# Patient Record
Sex: FEMALE | Race: OTHER | HISPANIC OR LATINO
[De-identification: names, ages, dates, MRNs, and addresses within clinical notes are randomized per-mention and may not be internally consistent; named-entity substitution may affect disease eponyms.]

---

## 2017-01-18 ENCOUNTER — APPOINTMENT (OUTPATIENT)
Dept: INTERNAL MEDICINE | Facility: CLINIC | Age: 37
End: 2017-01-18

## 2017-01-18 VITALS
WEIGHT: 255 LBS | OXYGEN SATURATION: 97 % | HEIGHT: 62.5 IN | DIASTOLIC BLOOD PRESSURE: 90 MMHG | HEART RATE: 92 BPM | SYSTOLIC BLOOD PRESSURE: 122 MMHG | BODY MASS INDEX: 45.75 KG/M2 | TEMPERATURE: 98.1 F

## 2017-01-18 DIAGNOSIS — Z79.2 LONG TERM (CURRENT) USE OF ANTIBIOTICS: ICD-10-CM

## 2017-01-23 LAB
25(OH)D3 SERPL-MCNC: 26.3 NG/ML
ALBUMIN SERPL ELPH-MCNC: 4.6 G/DL
ALP BLD-CCNC: 46 U/L
ALT SERPL-CCNC: 75 U/L
ANION GAP SERPL CALC-SCNC: 18 MMOL/L
AST SERPL-CCNC: 40 U/L
BILIRUB SERPL-MCNC: 1.1 MG/DL
BUN SERPL-MCNC: 13 MG/DL
CALCIUM SERPL-MCNC: 9.3 MG/DL
CHLORIDE SERPL-SCNC: 92 MMOL/L
CO2 SERPL-SCNC: 26 MMOL/L
CREAT SERPL-MCNC: 0.6 MG/DL
GLUCOSE SERPL-MCNC: 93 MG/DL
HBA1C MFR BLD HPLC: 6.3 %
POTASSIUM SERPL-SCNC: 4.1 MMOL/L
PROT SERPL-MCNC: 7.4 G/DL
SODIUM SERPL-SCNC: 136 MMOL/L

## 2017-02-17 ENCOUNTER — APPOINTMENT (OUTPATIENT)
Dept: INTERNAL MEDICINE | Facility: CLINIC | Age: 37
End: 2017-02-17

## 2017-02-17 VITALS — HEIGHT: 62.5 IN | WEIGHT: 259.75 LBS | BODY MASS INDEX: 46.6 KG/M2

## 2017-02-17 DIAGNOSIS — Z86.59 PERSONAL HISTORY OF OTHER MENTAL AND BEHAVIORAL DISORDERS: ICD-10-CM

## 2017-02-17 DIAGNOSIS — Z86.39 PERSONAL HISTORY OF OTHER ENDOCRINE, NUTRITIONAL AND METABOLIC DISEASE: ICD-10-CM

## 2017-03-17 ENCOUNTER — APPOINTMENT (OUTPATIENT)
Dept: INTERNAL MEDICINE | Facility: CLINIC | Age: 37
End: 2017-03-17

## 2017-03-19 ENCOUNTER — RX RENEWAL (OUTPATIENT)
Age: 37
End: 2017-03-19

## 2017-03-28 ENCOUNTER — APPOINTMENT (OUTPATIENT)
Dept: ENDOCRINOLOGY | Facility: CLINIC | Age: 37
End: 2017-03-28

## 2017-04-06 ENCOUNTER — APPOINTMENT (OUTPATIENT)
Dept: ENDOCRINOLOGY | Facility: CLINIC | Age: 37
End: 2017-04-06

## 2017-04-06 VITALS
SYSTOLIC BLOOD PRESSURE: 138 MMHG | HEIGHT: 62.5 IN | BODY MASS INDEX: 47.55 KG/M2 | DIASTOLIC BLOOD PRESSURE: 91 MMHG | WEIGHT: 265 LBS | HEART RATE: 90 BPM

## 2017-04-06 RX ORDER — TOPIRAMATE 50 MG/1
TABLET, COATED ORAL
Refills: 0 | Status: DISCONTINUED | COMMUNITY
End: 2017-04-06

## 2017-04-18 LAB
17OHP SERPL-MCNC: 33 NG/DL
25(OH)D3 SERPL-MCNC: 28 NG/ML
ALBUMIN SERPL ELPH-MCNC: 4.2 G/DL
ALP BLD-CCNC: 49 U/L
ALT SERPL-CCNC: 121 U/L
ANDROST SERPL-MCNC: 132 NG/DL
ANION GAP SERPL CALC-SCNC: 15 MMOL/L
AST SERPL-CCNC: 59 U/L
BASOPHILS # BLD AUTO: 0.03 K/UL
BASOPHILS NFR BLD AUTO: 0.3 %
BILIRUB SERPL-MCNC: 0.4 MG/DL
BUN SERPL-MCNC: 10 MG/DL
CALCIUM SERPL-MCNC: 9.7 MG/DL
CHLORIDE SERPL-SCNC: 102 MMOL/L
CHOLEST SERPL-MCNC: 136 MG/DL
CHOLEST/HDLC SERPL: 2.2 RATIO
CO2 SERPL-SCNC: 25 MMOL/L
CREAT SERPL-MCNC: 0.7 MG/DL
DHEA-S SERPL-MCNC: 252 UG/DL
EOSINOPHIL # BLD AUTO: 0.32 K/UL
EOSINOPHIL NFR BLD AUTO: 3.4 %
ESTRADIOL SERPL-MCNC: 177 PG/ML
FOLATE SERPL-MCNC: 12.7 NG/ML
FSH SERPL-MCNC: 3.4 IU/L
GLUCOSE SERPL-MCNC: 116 MG/DL
HBA1C MFR BLD HPLC: 6.2 %
HCT VFR BLD CALC: 42.3 %
HDLC SERPL-MCNC: 63 MG/DL
HGB BLD-MCNC: 13.7 G/DL
IMM GRANULOCYTES NFR BLD AUTO: 0.2 %
INSULIN P FAST SERPL-ACNC: 72.5 UU/ML
IRON SERPL-MCNC: 59 UG/DL
LDLC SERPL CALC-MCNC: 52 MG/DL
LH SERPL-ACNC: 7 IU/L
LYMPHOCYTES # BLD AUTO: 2.52 K/UL
LYMPHOCYTES NFR BLD AUTO: 26.7 %
MAN DIFF?: NORMAL
MCHC RBC-ENTMCNC: 29 PG
MCHC RBC-ENTMCNC: 32.4 GM/DL
MCV RBC AUTO: 89.6 FL
MONOCYTES # BLD AUTO: 0.58 K/UL
MONOCYTES NFR BLD AUTO: 6.2 %
NEUTROPHILS # BLD AUTO: 5.96 K/UL
NEUTROPHILS NFR BLD AUTO: 63.2 %
PLATELET # BLD AUTO: 360 K/UL
POTASSIUM SERPL-SCNC: 4.3 MMOL/L
PROLACTIN SERPL-MCNC: 21.3 NG/ML
PROT SERPL-MCNC: 7 G/DL
RBC # BLD: 4.72 M/UL
RBC # FLD: 13.6 %
SODIUM SERPL-SCNC: 142 MMOL/L
T3 SERPL-MCNC: 137 NG/DL
T4 FREE SERPL-MCNC: 1.2 NG/DL
TESTOST BND SERPL-MCNC: 1 PG/ML
TESTOST SERPL-MCNC: 31.9 NG/DL
TRIGL SERPL-MCNC: 107 MG/DL
TSH SERPL-ACNC: 2.64 UIU/ML
VIT B12 SERPL-MCNC: 564 PG/ML
WBC # FLD AUTO: 9.43 K/UL

## 2017-05-21 ENCOUNTER — FORM ENCOUNTER (OUTPATIENT)
Age: 37
End: 2017-05-21

## 2017-05-22 ENCOUNTER — OUTPATIENT (OUTPATIENT)
Dept: OUTPATIENT SERVICES | Facility: HOSPITAL | Age: 37
LOS: 1 days | End: 2017-05-22
Payer: COMMERCIAL

## 2017-05-22 PROCEDURE — 76536 US EXAM OF HEAD AND NECK: CPT

## 2017-05-22 PROCEDURE — 76536 US EXAM OF HEAD AND NECK: CPT | Mod: 26

## 2017-06-08 ENCOUNTER — APPOINTMENT (OUTPATIENT)
Dept: ENDOCRINOLOGY | Facility: CLINIC | Age: 37
End: 2017-06-08

## 2017-06-08 VITALS
HEIGHT: 61.5 IN | DIASTOLIC BLOOD PRESSURE: 94 MMHG | WEIGHT: 266.5 LBS | SYSTOLIC BLOOD PRESSURE: 141 MMHG | HEART RATE: 101 BPM | BODY MASS INDEX: 49.67 KG/M2

## 2017-06-08 DIAGNOSIS — E01.0 IODINE-DEFICIENCY RELATED DIFFUSE (ENDEMIC) GOITER: ICD-10-CM

## 2017-06-08 RX ORDER — METFORMIN HYDROCHLORIDE 500 MG/1
500 TABLET, COATED ORAL
Refills: 0 | Status: DISCONTINUED | COMMUNITY
End: 2017-06-08

## 2017-07-25 ENCOUNTER — APPOINTMENT (OUTPATIENT)
Dept: ENDOCRINOLOGY | Facility: CLINIC | Age: 37
End: 2017-07-25

## 2017-07-25 ENCOUNTER — APPOINTMENT (OUTPATIENT)
Dept: ENDOCRINOLOGY | Facility: CLINIC | Age: 37
End: 2017-07-25
Payer: COMMERCIAL

## 2017-07-25 VITALS
DIASTOLIC BLOOD PRESSURE: 93 MMHG | HEART RATE: 66 BPM | SYSTOLIC BLOOD PRESSURE: 175 MMHG | WEIGHT: 266 LBS | BODY MASS INDEX: 49.45 KG/M2

## 2017-07-25 VITALS — SYSTOLIC BLOOD PRESSURE: 139 MMHG | DIASTOLIC BLOOD PRESSURE: 87 MMHG

## 2017-07-25 PROCEDURE — 97802 MEDICAL NUTRITION INDIV IN: CPT

## 2017-09-12 ENCOUNTER — TRANSCRIPTION ENCOUNTER (OUTPATIENT)
Age: 37
End: 2017-09-12

## 2017-11-09 ENCOUNTER — APPOINTMENT (OUTPATIENT)
Dept: ENDOCRINOLOGY | Facility: CLINIC | Age: 37
End: 2017-11-09

## 2017-11-09 ENCOUNTER — APPOINTMENT (OUTPATIENT)
Dept: INTERNAL MEDICINE | Facility: CLINIC | Age: 37
End: 2017-11-09
Payer: COMMERCIAL

## 2017-11-09 VITALS
TEMPERATURE: 98.5 F | WEIGHT: 266 LBS | HEART RATE: 108 BPM | DIASTOLIC BLOOD PRESSURE: 84 MMHG | OXYGEN SATURATION: 97 % | HEIGHT: 61.5 IN | BODY MASS INDEX: 49.58 KG/M2 | SYSTOLIC BLOOD PRESSURE: 138 MMHG

## 2017-11-09 DIAGNOSIS — Z01.00 ENCOUNTER FOR EXAMINATION OF EYES AND VISION W/OUT ABNORMAL FINDINGS: ICD-10-CM

## 2017-11-09 LAB
BASOPHILS # BLD AUTO: 0.02 K/UL
BASOPHILS NFR BLD AUTO: 0.2 %
EOSINOPHIL # BLD AUTO: 0.44 K/UL
EOSINOPHIL NFR BLD AUTO: 5.1 %
HCT VFR BLD CALC: 43.4 %
HGB BLD-MCNC: 14.2 G/DL
IMM GRANULOCYTES NFR BLD AUTO: 0.2 %
LYMPHOCYTES # BLD AUTO: 2.64 K/UL
LYMPHOCYTES NFR BLD AUTO: 30.4 %
MAN DIFF?: NORMAL
MCHC RBC-ENTMCNC: 29.6 PG
MCHC RBC-ENTMCNC: 32.7 GM/DL
MCV RBC AUTO: 90.4 FL
MONOCYTES # BLD AUTO: 0.42 K/UL
MONOCYTES NFR BLD AUTO: 4.8 %
NEUTROPHILS # BLD AUTO: 5.15 K/UL
NEUTROPHILS NFR BLD AUTO: 59.3 %
PLATELET # BLD AUTO: 384 K/UL
RBC # BLD: 4.8 M/UL
RBC # FLD: 14.5 %
WBC # FLD AUTO: 8.69 K/UL

## 2017-11-09 PROCEDURE — 36415 COLL VENOUS BLD VENIPUNCTURE: CPT

## 2017-11-09 PROCEDURE — 99395 PREV VISIT EST AGE 18-39: CPT | Mod: 25

## 2017-11-09 PROCEDURE — 99212 OFFICE O/P EST SF 10 MIN: CPT | Mod: 25

## 2017-11-09 RX ORDER — FLUCONAZOLE 150 MG/1
150 TABLET ORAL
Qty: 1 | Refills: 0 | Status: COMPLETED | COMMUNITY
Start: 2017-08-26

## 2017-11-09 RX ORDER — BECLOMETHASONE DIPROPIONATE 80 UG/1
AEROSOL, METERED RESPIRATORY (INHALATION)
Refills: 0 | Status: COMPLETED | COMMUNITY
End: 2017-11-09

## 2017-11-09 RX ORDER — ALBUTEROL SULFATE 4 MG
TABLET ORAL
Refills: 0 | Status: COMPLETED | COMMUNITY
End: 2017-11-09

## 2017-11-09 RX ORDER — PHENTERMINE HYDROCHLORIDE 15 MG/1
15 CAPSULE ORAL
Qty: 90 | Refills: 1 | Status: COMPLETED | COMMUNITY
Start: 2017-06-08 | End: 2017-11-09

## 2017-11-09 RX ORDER — BUPROPION HYDROCHLORIDE 200 MG/1
200 TABLET, FILM COATED ORAL
Refills: 0 | Status: COMPLETED | COMMUNITY
End: 2017-11-09

## 2017-11-13 LAB
25(OH)D3 SERPL-MCNC: 31.9 NG/ML
ALBUMIN SERPL ELPH-MCNC: 4.5 G/DL
ALP BLD-CCNC: 43 U/L
ALT SERPL-CCNC: 128 U/L
ANION GAP SERPL CALC-SCNC: 21 MMOL/L
AST SERPL-CCNC: 57 U/L
BILIRUB SERPL-MCNC: 0.6 MG/DL
BUN SERPL-MCNC: 16 MG/DL
CALCIUM SERPL-MCNC: 9.7 MG/DL
CHLORIDE SERPL-SCNC: 99 MMOL/L
CHOLEST SERPL-MCNC: 132 MG/DL
CHOLEST/HDLC SERPL: 2.2 RATIO
CO2 SERPL-SCNC: 21 MMOL/L
CREAT SERPL-MCNC: 0.69 MG/DL
GLUCOSE SERPL-MCNC: 106 MG/DL
HBA1C MFR BLD HPLC: 6 %
HDLC SERPL-MCNC: 61 MG/DL
LDLC SERPL CALC-MCNC: 56 MG/DL
POTASSIUM SERPL-SCNC: 4.7 MMOL/L
PROT SERPL-MCNC: 7.5 G/DL
SODIUM SERPL-SCNC: 141 MMOL/L
TRIGL SERPL-MCNC: 73 MG/DL
TSH SERPL-ACNC: 2.78 UIU/ML

## 2017-11-16 ENCOUNTER — APPOINTMENT (OUTPATIENT)
Dept: ENDOCRINOLOGY | Facility: CLINIC | Age: 37
End: 2017-11-16

## 2018-02-01 ENCOUNTER — APPOINTMENT (OUTPATIENT)
Dept: ENDOCRINOLOGY | Facility: CLINIC | Age: 38
End: 2018-02-01

## 2018-05-01 ENCOUNTER — APPOINTMENT (OUTPATIENT)
Dept: ENDOCRINOLOGY | Facility: CLINIC | Age: 38
End: 2018-05-01
Payer: COMMERCIAL

## 2018-05-01 VITALS
SYSTOLIC BLOOD PRESSURE: 142 MMHG | BODY MASS INDEX: 48.46 KG/M2 | DIASTOLIC BLOOD PRESSURE: 83 MMHG | HEIGHT: 61.5 IN | WEIGHT: 260 LBS | HEART RATE: 86 BPM

## 2018-05-01 PROCEDURE — 99214 OFFICE O/P EST MOD 30 MIN: CPT

## 2018-05-03 ENCOUNTER — TRANSCRIPTION ENCOUNTER (OUTPATIENT)
Age: 38
End: 2018-05-03

## 2018-05-03 LAB
24R-OH-CALCIDIOL SERPL-MCNC: 72.4 PG/ML
25(OH)D3 SERPL-MCNC: 34.3 NG/ML
ALBUMIN SERPL ELPH-MCNC: 5 G/DL
ALP BLD-CCNC: 45 U/L
ALT SERPL-CCNC: 125 U/L
ANION GAP SERPL CALC-SCNC: 17 MMOL/L
AST SERPL-CCNC: 79 U/L
BASOPHILS # BLD AUTO: 0.03 K/UL
BASOPHILS NFR BLD AUTO: 0.4 %
BILIRUB SERPL-MCNC: 0.4 MG/DL
BUN SERPL-MCNC: 14 MG/DL
CALCIUM SERPL-MCNC: 9.9 MG/DL
CHLORIDE SERPL-SCNC: 96 MMOL/L
CHOLEST SERPL-MCNC: 151 MG/DL
CHOLEST/HDLC SERPL: 2.2 RATIO
CO2 SERPL-SCNC: 27 MMOL/L
CREAT SERPL-MCNC: 0.79 MG/DL
CREAT SPEC-SCNC: 222 MG/DL
EOSINOPHIL # BLD AUTO: 0.34 K/UL
EOSINOPHIL NFR BLD AUTO: 4.3 %
GLUCOSE SERPL-MCNC: 104 MG/DL
HBA1C MFR BLD HPLC: 6.1 %
HCT VFR BLD CALC: 46.2 %
HDLC SERPL-MCNC: 70 MG/DL
HGB BLD-MCNC: 14.6 G/DL
IMM GRANULOCYTES NFR BLD AUTO: 0.3 %
LDLC SERPL CALC-MCNC: 64 MG/DL
LYMPHOCYTES # BLD AUTO: 2.42 K/UL
LYMPHOCYTES NFR BLD AUTO: 30.7 %
MAN DIFF?: NORMAL
MCHC RBC-ENTMCNC: 28.5 PG
MCHC RBC-ENTMCNC: 31.6 GM/DL
MCV RBC AUTO: 90.2 FL
MICROALBUMIN 24H UR DL<=1MG/L-MCNC: 2.1 MG/DL
MICROALBUMIN/CREAT 24H UR-RTO: 9 MG/G
MONOCYTES # BLD AUTO: 0.54 K/UL
MONOCYTES NFR BLD AUTO: 6.9 %
NEUTROPHILS # BLD AUTO: 4.53 K/UL
NEUTROPHILS NFR BLD AUTO: 57.4 %
PLATELET # BLD AUTO: 398 K/UL
POTASSIUM SERPL-SCNC: 4.6 MMOL/L
PROT SERPL-MCNC: 7.8 G/DL
RBC # BLD: 5.12 M/UL
RBC # FLD: 13.9 %
SODIUM SERPL-SCNC: 140 MMOL/L
T3FREE SERPL-MCNC: 2.83 PG/ML
T4 FREE SERPL-MCNC: 1.3 NG/DL
TRIGL SERPL-MCNC: 83 MG/DL
TSH SERPL-ACNC: 3.33 UIU/ML
WBC # FLD AUTO: 7.88 K/UL

## 2018-05-04 ENCOUNTER — APPOINTMENT (OUTPATIENT)
Dept: HUMAN REPRODUCTION | Facility: CLINIC | Age: 38
End: 2018-05-04
Payer: COMMERCIAL

## 2018-05-04 PROCEDURE — 36415 COLL VENOUS BLD VENIPUNCTURE: CPT

## 2018-05-04 PROCEDURE — 99215 OFFICE O/P EST HI 40 MIN: CPT | Mod: 25

## 2018-05-05 ENCOUNTER — FORM ENCOUNTER (OUTPATIENT)
Age: 38
End: 2018-05-05

## 2018-05-06 ENCOUNTER — APPOINTMENT (OUTPATIENT)
Dept: ULTRASOUND IMAGING | Facility: HOSPITAL | Age: 38
End: 2018-05-06
Payer: COMMERCIAL

## 2018-05-06 ENCOUNTER — OUTPATIENT (OUTPATIENT)
Dept: OUTPATIENT SERVICES | Facility: HOSPITAL | Age: 38
LOS: 1 days | End: 2018-05-06
Payer: COMMERCIAL

## 2018-05-06 PROCEDURE — 76536 US EXAM OF HEAD AND NECK: CPT | Mod: 26

## 2018-05-06 PROCEDURE — 76536 US EXAM OF HEAD AND NECK: CPT

## 2018-05-08 ENCOUNTER — TRANSCRIPTION ENCOUNTER (OUTPATIENT)
Age: 38
End: 2018-05-08

## 2018-07-05 ENCOUNTER — TRANSCRIPTION ENCOUNTER (OUTPATIENT)
Age: 38
End: 2018-07-05

## 2018-07-09 ENCOUNTER — APPOINTMENT (OUTPATIENT)
Dept: OBGYN | Facility: CLINIC | Age: 38
End: 2018-07-09
Payer: COMMERCIAL

## 2018-07-09 VITALS
DIASTOLIC BLOOD PRESSURE: 113 MMHG | HEIGHT: 62 IN | SYSTOLIC BLOOD PRESSURE: 140 MMHG | BODY MASS INDEX: 48.4 KG/M2 | WEIGHT: 263 LBS

## 2018-07-09 DIAGNOSIS — Z78.9 OTHER SPECIFIED HEALTH STATUS: ICD-10-CM

## 2018-07-09 DIAGNOSIS — Z80.3 FAMILY HISTORY OF MALIGNANT NEOPLASM OF BREAST: ICD-10-CM

## 2018-07-09 DIAGNOSIS — Z82.3 FAMILY HISTORY OF STROKE: ICD-10-CM

## 2018-07-09 PROCEDURE — 99245 OFF/OP CONSLTJ NEW/EST HI 55: CPT

## 2018-10-05 ENCOUNTER — APPOINTMENT (OUTPATIENT)
Dept: ENDOCRINOLOGY | Facility: CLINIC | Age: 38
End: 2018-10-05

## 2019-03-05 PROBLEM — Z31.69 ENCOUNTER FOR PRECONCEPTION CONSULTATION: Status: RESOLVED | Noted: 2018-07-09 | Resolved: 2019-03-05

## 2019-03-05 PROBLEM — O09.529 ANTEPARTUM MULTIGRAVIDA OF ADVANCED MATERNAL AGE: Status: RESOLVED | Noted: 2018-07-09 | Resolved: 2019-03-05

## 2019-03-06 ENCOUNTER — APPOINTMENT (OUTPATIENT)
Dept: INTERNAL MEDICINE | Facility: CLINIC | Age: 39
End: 2019-03-06
Payer: COMMERCIAL

## 2019-03-06 ENCOUNTER — LABORATORY RESULT (OUTPATIENT)
Age: 39
End: 2019-03-06

## 2019-03-06 VITALS
HEIGHT: 62 IN | BODY MASS INDEX: 49.13 KG/M2 | TEMPERATURE: 98.2 F | WEIGHT: 267 LBS | HEART RATE: 107 BPM | OXYGEN SATURATION: 95 % | DIASTOLIC BLOOD PRESSURE: 84 MMHG | SYSTOLIC BLOOD PRESSURE: 118 MMHG

## 2019-03-06 DIAGNOSIS — O09.529 SUPERVISION OF ELDERLY MULTIGRAVIDA, UNSPECIFIED TRIMESTER: ICD-10-CM

## 2019-03-06 DIAGNOSIS — Z31.69 ENCOUNTER FOR OTHER GENERAL COUNSELING AND ADVICE ON PROCREATION: ICD-10-CM

## 2019-03-06 DIAGNOSIS — Z23 ENCOUNTER FOR IMMUNIZATION: ICD-10-CM

## 2019-03-06 DIAGNOSIS — Z87.898 PERSONAL HISTORY OF OTHER SPECIFIED CONDITIONS: ICD-10-CM

## 2019-03-06 DIAGNOSIS — Z86.59 PERSONAL HISTORY OF OTHER MENTAL AND BEHAVIORAL DISORDERS: ICD-10-CM

## 2019-03-06 PROCEDURE — 99214 OFFICE O/P EST MOD 30 MIN: CPT | Mod: 25

## 2019-03-06 PROCEDURE — G0008: CPT

## 2019-03-06 PROCEDURE — 36415 COLL VENOUS BLD VENIPUNCTURE: CPT

## 2019-03-06 PROCEDURE — 90686 IIV4 VACC NO PRSV 0.5 ML IM: CPT

## 2019-03-06 PROCEDURE — 93000 ELECTROCARDIOGRAM COMPLETE: CPT

## 2019-03-06 NOTE — HISTORY OF PRESENT ILLNESS
[de-identified] : 38 y/o female with h/o fatty liver, pre-diabetes, asthma and HTN (NOT on medication) is here for preop clearance prior to D&D/hysteroscopic polyp resection. SHe developed heavy menses several years ago and was found to have polyps.\par She uses rescue MDI ~1/week. \par She needs refills on Metformin.\par No other complaints.\par

## 2019-03-06 NOTE — REVIEW OF SYSTEMS
[Negative] : Heme/Lymph [Dysuria] : no dysuria [Hematuria] : no hematuria [Dysmenorrhea] : dysmenorrhea

## 2019-03-06 NOTE — PHYSICAL EXAM
[No Acute Distress] : no acute distress [Normal Sclera/Conjunctiva] : normal sclera/conjunctiva [Normal Outer Ear/Nose] : the outer ears and nose were normal in appearance [Normal Oropharynx] : the oropharynx was normal [No JVD] : no jugular venous distention [Supple] : supple [No Respiratory Distress] : no respiratory distress  [Clear to Auscultation] : lungs were clear to auscultation bilaterally [No Accessory Muscle Use] : no accessory muscle use [Normal Rate] : normal rate  [Regular Rhythm] : with a regular rhythm [No Edema] : there was no peripheral edema [No Extremity Clubbing/Cyanosis] : no extremity clubbing/cyanosis [Soft] : abdomen soft [Non Tender] : non-tender [No HSM] : no HSM [Normal Bowel Sounds] : normal bowel sounds [No Joint Swelling] : no joint swelling [No Rash] : no rash [Normal Gait] : normal gait [Normal Affect] : the affect was normal [Alert and Oriented x3] : oriented to person, place, and time [de-identified] : obese

## 2019-03-06 NOTE — ASSESSMENT
[FreeTextEntry1] : 38 y/o female is here for preop clearance prior to D&C/polypectomy.\par Metformin refilled (hold day of surgery).\par No NSAIDS one week prior.\par BP good off meds.\par FLu shot given.\par Labs sent. Pending normal results, she will be cleared to proceed.

## 2019-03-07 LAB
ALBUMIN SERPL ELPH-MCNC: 4.3 G/DL
ALP BLD-CCNC: 52 U/L
ALT SERPL-CCNC: 61 U/L
ANION GAP SERPL CALC-SCNC: 13 MMOL/L
APPEARANCE: CLEAR
APTT BLD: 33.4 SEC
AST SERPL-CCNC: 34 U/L
BASOPHILS # BLD AUTO: 0.05 K/UL
BASOPHILS NFR BLD AUTO: 0.6 %
BILIRUB SERPL-MCNC: 0.3 MG/DL
BILIRUBIN URINE: NEGATIVE
BLOOD URINE: ABNORMAL
BUN SERPL-MCNC: 8 MG/DL
CALCIUM SERPL-MCNC: 9.2 MG/DL
CHLORIDE SERPL-SCNC: 102 MMOL/L
CO2 SERPL-SCNC: 25 MMOL/L
COLOR: NORMAL
CREAT SERPL-MCNC: 0.59 MG/DL
EOSINOPHIL # BLD AUTO: 0.35 K/UL
EOSINOPHIL NFR BLD AUTO: 4.3 %
GLUCOSE QUALITATIVE U: NEGATIVE
GLUCOSE SERPL-MCNC: 106 MG/DL
HCG SERPL-MCNC: <1 MIU/ML
HCT VFR BLD CALC: 40.7 %
HGB BLD-MCNC: 12.3 G/DL
IMM GRANULOCYTES NFR BLD AUTO: 0.4 %
INR PPP: 0.9 RATIO
KETONES URINE: NEGATIVE
LEUKOCYTE ESTERASE URINE: ABNORMAL
LYMPHOCYTES # BLD AUTO: 2.4 K/UL
LYMPHOCYTES NFR BLD AUTO: 29.6 %
MAN DIFF?: NORMAL
MCHC RBC-ENTMCNC: 25.9 PG
MCHC RBC-ENTMCNC: 30.2 GM/DL
MCV RBC AUTO: 85.9 FL
MONOCYTES # BLD AUTO: 0.53 K/UL
MONOCYTES NFR BLD AUTO: 6.5 %
NEUTROPHILS # BLD AUTO: 4.75 K/UL
NEUTROPHILS NFR BLD AUTO: 58.6 %
NITRITE URINE: NEGATIVE
PH URINE: 6
PLATELET # BLD AUTO: 412 K/UL
POTASSIUM SERPL-SCNC: 4.6 MMOL/L
PROT SERPL-MCNC: 7.2 G/DL
PROTEIN URINE: NEGATIVE
PT BLD: 10.2 SEC
RBC # BLD: 4.74 M/UL
RBC # FLD: 13.6 %
SODIUM SERPL-SCNC: 140 MMOL/L
SPECIFIC GRAVITY URINE: 1.01
UROBILINOGEN URINE: NORMAL
WBC # FLD AUTO: 8.11 K/UL

## 2019-03-25 VITALS
HEART RATE: 88 BPM | WEIGHT: 268.96 LBS | SYSTOLIC BLOOD PRESSURE: 137 MMHG | TEMPERATURE: 98 F | HEIGHT: 62 IN | RESPIRATION RATE: 20 BRPM | DIASTOLIC BLOOD PRESSURE: 89 MMHG | OXYGEN SATURATION: 97 %

## 2019-03-25 NOTE — ASU PATIENT PROFILE, ADULT - PSH
H/O bilateral breast biopsy  BENIGN  History of surgery  THYROID BIOPSY- ENLARGED  [place] teeth extracted

## 2019-03-26 ENCOUNTER — OUTPATIENT (OUTPATIENT)
Dept: OUTPATIENT SERVICES | Facility: HOSPITAL | Age: 39
LOS: 1 days | Discharge: HOME | End: 2019-03-26
Payer: COMMERCIAL

## 2019-03-26 ENCOUNTER — RESULT REVIEW (OUTPATIENT)
Age: 39
End: 2019-03-26

## 2019-03-26 VITALS — HEART RATE: 80 BPM | TEMPERATURE: 98 F | RESPIRATION RATE: 21 BRPM | OXYGEN SATURATION: 97 %

## 2019-03-26 DIAGNOSIS — K08.409 PARTIAL LOSS OF TEETH, UNSPECIFIED CAUSE, UNSPECIFIED CLASS: Chronic | ICD-10-CM

## 2019-03-26 DIAGNOSIS — Z98.890 OTHER SPECIFIED POSTPROCEDURAL STATES: Chronic | ICD-10-CM

## 2019-03-26 LAB — GLUCOSE BLDC GLUCOMTR-MCNC: 96 MG/DL — SIGNIFICANT CHANGE UP (ref 70–99)

## 2019-03-26 PROCEDURE — 88305 TISSUE EXAM BY PATHOLOGIST: CPT

## 2019-03-26 PROCEDURE — 82962 GLUCOSE BLOOD TEST: CPT

## 2019-03-26 PROCEDURE — 86900 BLOOD TYPING SEROLOGIC ABO: CPT

## 2019-03-26 PROCEDURE — 86850 RBC ANTIBODY SCREEN: CPT

## 2019-03-26 PROCEDURE — 86901 BLOOD TYPING SEROLOGIC RH(D): CPT

## 2019-03-26 PROCEDURE — C1889: CPT

## 2019-03-26 PROCEDURE — 58558 HYSTEROSCOPY BIOPSY: CPT

## 2019-03-26 RX ORDER — OXYCODONE HYDROCHLORIDE 5 MG/1
5 TABLET ORAL EVERY 6 HOURS
Qty: 0 | Refills: 0 | Status: DISCONTINUED | OUTPATIENT
Start: 2019-03-26 | End: 2019-03-26

## 2019-03-26 RX ORDER — OXYCODONE HYDROCHLORIDE 5 MG/1
10 TABLET ORAL EVERY 6 HOURS
Qty: 0 | Refills: 0 | Status: DISCONTINUED | OUTPATIENT
Start: 2019-03-26 | End: 2019-03-26

## 2019-03-26 RX ORDER — ONDANSETRON 8 MG/1
4 TABLET, FILM COATED ORAL EVERY 6 HOURS
Qty: 0 | Refills: 0 | Status: DISCONTINUED | OUTPATIENT
Start: 2019-03-26 | End: 2019-03-26

## 2019-03-26 RX ORDER — HYDROMORPHONE HYDROCHLORIDE 2 MG/ML
0.5 INJECTION INTRAMUSCULAR; INTRAVENOUS; SUBCUTANEOUS
Qty: 0 | Refills: 0 | Status: DISCONTINUED | OUTPATIENT
Start: 2019-03-26 | End: 2019-03-26

## 2019-03-26 NOTE — PACU DISCHARGE NOTE - COMMENTS
PT DISCHARGED HOME IN CARE OF BOYFRIEND RESP REMAIN REG NO DISTRESS N/C NAUSEA IV D'CD TIP INTACT SCANT AMT VAG BLEEDING NOTED PT DRESSED AND AMB TO LOBBY WITHOUT ASST.

## 2019-03-26 NOTE — BRIEF OPERATIVE NOTE - OPERATION/FINDINGS
Normal appearing vagina and cervix, cervical polyps noted, significant polypoid tissue noted within the endometrial canal

## 2019-03-26 NOTE — BRIEF OPERATIVE NOTE - NSICDXBRIEFPREOP_GEN_ALL_CORE_FT
PRE-OP DIAGNOSIS:  Endometrial polyp 26-Mar-2019 13:28:51  Tarci Tripathi PRE-OP DIAGNOSIS:  Endometrial polyp 26-Mar-2019 13:28:51  Traci Tripathi

## 2019-03-26 NOTE — BRIEF OPERATIVE NOTE - NSICDXBRIEFPROCEDURE_GEN_ALL_CORE_FT
PROCEDURES:  Hysteroscopic polypectomy using MyoSure tissue removal system 26-Mar-2019 13:28:29  Traci Tripathi  Diagnostic hysteroscopy 26-Mar-2019 13:27:36  Traci Tripathi PROCEDURES:  Dilation and curettage of uterus with polypectomy 26-Mar-2019 13:40:18  Traci Tripathi  Hysteroscopic polypectomy using MyoSure tissue removal system 26-Mar-2019 13:28:29  Traci Tripathi  Diagnostic hysteroscopy 26-Mar-2019 13:27:36  Traci Tripathi

## 2019-03-27 LAB — SURGICAL PATHOLOGY STUDY: SIGNIFICANT CHANGE UP

## 2019-04-09 PROBLEM — F41.9 ANXIETY DISORDER, UNSPECIFIED: Chronic | Status: ACTIVE | Noted: 2019-03-26

## 2019-04-09 PROBLEM — F32.9 MAJOR DEPRESSIVE DISORDER, SINGLE EPISODE, UNSPECIFIED: Chronic | Status: ACTIVE | Noted: 2019-03-26

## 2019-04-09 PROBLEM — M54.30 SCIATICA, UNSPECIFIED SIDE: Chronic | Status: ACTIVE | Noted: 2019-03-26

## 2019-04-09 PROBLEM — H52.209 UNSPECIFIED ASTIGMATISM, UNSPECIFIED EYE: Chronic | Status: ACTIVE | Noted: 2019-03-26

## 2019-04-09 PROBLEM — J45.909 UNSPECIFIED ASTHMA, UNCOMPLICATED: Chronic | Status: ACTIVE | Noted: 2019-03-26

## 2019-04-19 ENCOUNTER — APPOINTMENT (OUTPATIENT)
Dept: GYNECOLOGIC ONCOLOGY | Facility: CLINIC | Age: 39
End: 2019-04-19
Payer: COMMERCIAL

## 2019-04-19 VITALS
TEMPERATURE: 98.4 F | OXYGEN SATURATION: 98 % | HEART RATE: 102 BPM | WEIGHT: 271 LBS | HEIGHT: 62 IN | SYSTOLIC BLOOD PRESSURE: 137 MMHG | DIASTOLIC BLOOD PRESSURE: 87 MMHG | BODY MASS INDEX: 49.87 KG/M2

## 2019-04-19 PROCEDURE — 99245 OFF/OP CONSLTJ NEW/EST HI 55: CPT

## 2019-04-19 NOTE — HISTORY OF PRESENT ILLNESS
[FreeTextEntry1] : Problem\par 1)Complex atypical hyperplasia \par \par Previous Therapy\par 1)EMC 3/26/19\par    a)Endometrial polyp curettage- Fragments of endometrium with complex atypical hyperplasia\par    b)EMC- Fragments of endometrium with complex atypical hyperplasia

## 2019-04-19 NOTE — CHIEF COMPLAINT
[FreeTextEntry1] : New patient consult. 39 y.o patient referred by Dr. Pride presents today for CAH. She reports a lifetime hx of irregular menses, most recently with spotting between. D&C hysteroscopy signficant for CAH in a polyp in a background of CAH. Patient strongly desires fertility.\par \par GynHx: as above\par ObHx: N/A\par PmHx: obesity, asthma\par SurgHx: wisdom teeth, breast biopsy (fibroadenoma), \par Meds: albuterol, ibuprofen, loratidine, multivitamin, metformin\par All: NKDA\par SocHx: lives with partner, , single; social ETOH, no other toxic habits\par FamHx: Mother breast/sarcoma; maternal aunt breast cancer, paternal grandfather prostate\par

## 2019-04-19 NOTE — ASSESSMENT
[FreeTextEntry1] : Premenopausal women who wish to preserve fertility or women of any menopausal status who are not surgical candidates may be treated with progestin therapy.\par \par Successful pregnancy does occur after resolution of atypical hyperplasia. Treatment with other agents or conservative surgery is not standard clinical practice, but has been described.\par \par Importance of compliance with medical therapy and follow-up endometrial sampling is very important. \par Progestin therapy is typically the oral progestin used for atypical hyperplasia because it is more potent than Progestin therapy. Oral megestrol acetate 80 mg twice per day. This may be increased to 160 mg twice per day if there is no regression of the hyperplasia on follow-up endometrial sampling. Alternatively, a 20 mcg/day Mirena IUD-releasing intrauterine device (LNg20; Mirena) may be utilized alone or can be used in conjunction with oral progestin, but patient does not want to do this at this time.\par \par Other options for progestin therapy have been described and include: \par MPA (oral) 10 to 20 mg daily\par Depot medroxyprogesterone (intramuscular) 150 mg every three months\par Micronized progestin (vaginal) 100 to 200 mg daily\par \par Patient is interested in getting pregnant and thus wants the most effective treatment.   \par Women with complex atypical hyperplasia reported a regression rate of 86 percent, relapse rate of 26 percent.  \par \par I encouraged patient to meet with [facility] over the next few weeks to establish a relationship and discuss fertility options.\par \par Endometrial sampling should be repeated three months after the initiation of progestin therapy. If persistent disease is noted, the progestin dose may be increased. Sampling should be repeated again after three months. The median time for regression on progestin therapy from six to nine months. \par \par Patient with possible hx of PCOS, already on metformin. Recommend that she continue. \par \par Side effects of both drugs have been discussed with patient.\par She requests non-generic agents.  \par She will be seen in 3 months for EMBx.\par Many questions answered.\par \par [] Megace 80mg daily\par [] Patient to make appointment to return for IUD\par [] Follow up with Dr. Pride\par [] Genetic counseling\par \par

## 2019-05-03 ENCOUNTER — APPOINTMENT (OUTPATIENT)
Dept: GYNECOLOGIC ONCOLOGY | Facility: CLINIC | Age: 39
End: 2019-05-03
Payer: COMMERCIAL

## 2019-05-03 PROCEDURE — 58300 INSERT INTRAUTERINE DEVICE: CPT

## 2019-05-03 PROCEDURE — 99215 OFFICE O/P EST HI 40 MIN: CPT | Mod: 25

## 2019-05-08 NOTE — PHYSICAL EXAM
[Normal] : Anus and perineum: Normal sphincter tone, no masses, no prolapse. [de-identified] : morbid obesity, nontender [de-identified] : could not appreciate 2' body habitus [de-identified] : cannot appreciate 2' to body habitus

## 2019-05-08 NOTE — ASSESSMENT
[FreeTextEntry1] : Mirena placed without complication today\par \par [] Megace 80mg daily\par [] Follow up with Dr. Pride\par [] Genetic counseling (Referred 5/2/19)\par [] Follow up in 1 month for string check\par \par \par

## 2019-05-08 NOTE — PROCEDURE
[Other ___] : [unfilled] [Verbal Consent] : verbal consent was obtained prior to the procedure and is detailed in the patient's record [Other: ___] : [unfilled] [Patient] : the patient [None] : none [No Complications] : none [Betadine] : betadine [Post procedure instructions and information given] : post procedure instructions and information given and reviewed with patient. [Tolerated Well] : the patient tolerated the procedure well [FreeTextEntry1] : Uterus sounded to 8cm. Mirena placed without complications. Strings cut to 1 cm\par \par Lot [id number]\par Exp 6/20

## 2019-06-05 ENCOUNTER — APPOINTMENT (OUTPATIENT)
Dept: GYNECOLOGIC ONCOLOGY | Facility: CLINIC | Age: 39
End: 2019-06-05
Payer: COMMERCIAL

## 2019-06-05 PROCEDURE — 99215 OFFICE O/P EST HI 40 MIN: CPT

## 2019-06-05 NOTE — PHYSICAL EXAM
[Normal] : Recto-Vaginal Exam: Normal [de-identified] : IUD strings not visualized. Nontender [de-identified] : morbid obesity, nontender [de-identified] : cannot appreciate 2' to body habitus [de-identified] : could not appreciate 2' body habitus

## 2019-06-05 NOTE — REASON FOR VISIT
[FreeTextEntry1] : Here today for follow up after Mirena placement 5/3/19. Patient reports significant cramping since placement. We discussed possibly removing the IUD and treating with oral progestin alone.

## 2019-06-05 NOTE — ASSESSMENT
[FreeTextEntry1] : Patient with persistent cramping and IUD strings not visualized. Will request ultrasound to assess position in the uterus. \par \par [] TVS\par

## 2019-08-23 ENCOUNTER — APPOINTMENT (OUTPATIENT)
Dept: GYNECOLOGIC ONCOLOGY | Facility: CLINIC | Age: 39
End: 2019-08-23
Payer: COMMERCIAL

## 2019-08-23 VITALS
OXYGEN SATURATION: 94 % | HEIGHT: 62 IN | HEART RATE: 98 BPM | WEIGHT: 284 LBS | BODY MASS INDEX: 52.26 KG/M2 | SYSTOLIC BLOOD PRESSURE: 131 MMHG | DIASTOLIC BLOOD PRESSURE: 91 MMHG

## 2019-08-23 PROCEDURE — 58100 BIOPSY OF UTERUS LINING: CPT

## 2019-08-23 PROCEDURE — 99215 OFFICE O/P EST HI 40 MIN: CPT | Mod: 25

## 2019-08-23 NOTE — HISTORY OF PRESENT ILLNESS
[FreeTextEntry1] : Problem\par 1)Complex atypical hyperplasia \par \par Previous Therapy\par 1)EMC 3/26/19\par    a)Endometrial polyp curettage- Fragments of endometrium with complex atypical hyperplasia\par    b)EMC- Fragments of endometrium with complex atypical hyperplasia \par 2) Mirena IUD 5/3/19\par 3) Pelvic US 6/20/19 \par    a) IUD is identified within the endometrium. endometrium appears markedly thickened

## 2019-08-23 NOTE — ASSESSMENT
[FreeTextEntry1] : Patient doing well overall. We discussed possible biopsy results and recommended management for each possibility. If regression confirmed, will continue current management. If progression or persistence will increase megace dose. \par \par Questions answered. Compliance with treatment emphasized.\par \par [] EMBx\par [] Follow up in 3 months.

## 2019-08-23 NOTE — REASON FOR VISIT
[FreeTextEntry1] : Here for follow up, repeat EMB. Patient reports improvement in symptoms. Decreased vaginal bleeding and she no longer is experiencing uterine cramping. \par \par GynHx: as above, Mirena placed 5/3/19\par ObHx: N/A\par PmHx: obesity, asthma\par SurgHx: wisdom teeth, breast biopsy (fibroadenoma), \par Meds: albuterol, ibuprofen, loratidine, multivitamin, metformin\par All: NKDA\par SocHx: lives with partner, , single; social ETOH, no other toxic habits\par FamHx: Mother breast/sarcoma; maternal aunt breast cancer, paternal grandfather prostate\par

## 2019-08-23 NOTE — PROCEDURE
[Endometrial Biopsy] : an endometrial biopsy [Other: ___] : [unfilled] [Patient] : the patient [Betadine] : betadine [None] : none [Yes] : the specimen was sent to pathology [No Complications] : none [Tolerated Well] : the patient tolerated the procedure well [Post procedure instructions and information given] : post procedure instructions and information given and reviewed with patient. [FreeTextEntry1] : Pipelle passed to 9 cm, two passes with copious blood extracted. Difficult to appreciate if tissue also extracted

## 2019-08-23 NOTE — PHYSICAL EXAM
[de-identified] : morbid obesity, nontender [de-identified] : IUD strings not visualized. Nontender [de-identified] : could not appreciate 2' body habitus [de-identified] : cannot appreciate 2' to body habitus

## 2019-08-27 ENCOUNTER — APPOINTMENT (OUTPATIENT)
Dept: ENDOCRINOLOGY | Facility: CLINIC | Age: 39
End: 2019-08-27
Payer: COMMERCIAL

## 2019-08-27 VITALS
HEART RATE: 90 BPM | SYSTOLIC BLOOD PRESSURE: 154 MMHG | BODY MASS INDEX: 51.21 KG/M2 | WEIGHT: 280 LBS | DIASTOLIC BLOOD PRESSURE: 103 MMHG

## 2019-08-27 LAB
GLUCOSE BLDC GLUCOMTR-MCNC: 94
HBA1C MFR BLD HPLC: 6.1

## 2019-08-27 PROCEDURE — 82962 GLUCOSE BLOOD TEST: CPT

## 2019-08-27 PROCEDURE — 99215 OFFICE O/P EST HI 40 MIN: CPT | Mod: 25

## 2019-08-27 PROCEDURE — 83036 HEMOGLOBIN GLYCOSYLATED A1C: CPT | Mod: QW

## 2019-08-27 PROCEDURE — G0447 BEHAVIOR COUNSEL OBESITY 15M: CPT

## 2019-08-27 RX ORDER — SULFAMETHOXAZOLE AND TRIMETHOPRIM 800; 160 MG/1; MG/1
800-160 TABLET ORAL
Qty: 6 | Refills: 0 | Status: DISCONTINUED | COMMUNITY
Start: 2019-03-07 | End: 2019-08-27

## 2019-08-27 NOTE — ASSESSMENT
[FreeTextEntry1] : Elevated body mass index. Comorbidity of elevated HbA1c. HbA1c 6.1% and blood glucose 94 mg/dL today. We discussed the importance of diet and exercise and lifestyle modification for weight loss. We discussed referral to nutrition. We discussed pharmacologic options for weight loss. She is tolerating metformin and we will continue. She will discuss with her gynecologic oncologist if substitution of megestrol acetate for another progesterone may be appropriate, since megestrol acetate is an appetite stimulant. She did not tolerate prior phentermine. She tolerated prior bupropion for treatment of depression/anxiety, but defer at this time due to hypertension. We discussed the risks and benefits of the GLP-1 receptor agonist class, including but not limited to nausea, pancreatitis, medullary thyroid cancer. She will further consider for next visit. We discussed surgical options for weight loss and she will further consider. Over 15 minutes spent in counseling for weight loss.\par Lifestyle modification\par Referral to nutrition\par \par Thyroid nodules. Her last thyroid ultrasound in June 2018 demonstrated a 2.0 x 1.3 x 1.5 cm left inferior pole nodule and a right 1.0 x 0.5 x 1.0 cm right inferior nodule without suspicious features. Fine needle aspiration of the left inferior nodule in June 2018 was [place] II (benign). She has been clinically and biochemically euthyroid. \par Interval thyroid ultrasound\par Check TSH next visit; she declines today\par \par Polycystic ovarian syndrome. She was diagnosed with polycystic ovarian syndrome in the setting of oligomenorrhea, polycystic ovaries on imaging, and biochemical/clinical evidence of hyperandrogenism. Previous evaluation for other causes of oligomenorrhea was unrevealing. She is treated with a Mirena intrauterine device and megestrol acetate for her complex atypical endometrial hyperplasia.\par \par Return to see me in 2 months.

## 2019-08-27 NOTE — HISTORY OF PRESENT ILLNESS
[FreeTextEntry1] : Ms. Wolf is a 39 year-old woman with a history of polycystic ovarian syndrome, elevated body mass index, elevated HbA1c, thyroid nodules, asthma, seasonal allergies, recent diagnosis of complex atypical endometrial hyperplasia presenting to establish care with me for her endocrine issues. She is a former patient of Eduin Vásquez and Darrell, last seen May 2018. Of note, she is treated with a Mirena intrauterine device and megestrol acetate for her complex atypical endometrial hyperplasia.\par \par Elevated body mass index. Comorbidity of elevated HbA1c. HbA1c 6.1% and blood glucose 94 mg/dL today.\par Her young adult weight was around 200 pounds. She gained weight in college and then on subsequent prior antidepressant therapy. Her current weight of 280 pounds is her highest weight. \par She was on phentermine in the past but did not tolerate due to palpitations. \par She was on bupropion in the past for depression/anxiety, off for a few years. \par She is taking metformin  mg daily; she was previously taking 1000 mg twice daily but was running out of pills prior to this appointment.\par 24 hour diet recall: breakfast, black iced coffee; lunch, salmon, potatoes, beets, avocado; dinner, mozzarella sticks; snacks, tortilla chips, hummus; has green tea with Splenda, no juices/sodas\par Exercise: Limited\par \par Multiple thyroid nodules.\par She was diagnosed with thyroid nodules in 2017 on physical examination, confirmed with thyroid ultrasound.\par Her last thyroid ultrasound in June 2018 demonstrated a 2.0 x 1.3 x 1.5 cm left inferior pole nodule and a right 1.0 x 0.5 x 1.0 cm right inferior nodule without suspicious features. \par Fine needle aspiration of the left inferior nodule in June 2018 was [place] II (benign).\par She has been clinically and biochemically euthyroid. \par Her mother has a history of goiter.\par \par Polycystic ovarian syndrome.\par She was diagnosed with polycystic ovarian syndrome in the setting of oligomenorrhea, polycystic ovaries on imaging, and biochemical/clinical evidence of hyperandrogenism. She has hirsutism above her lip and chin that she plucks. \par Previous evaluation for other causes of oligomenorrhea was unrevealing. \par She was treated with a combined oral contraceptive pill for about four years and has been off since around 2002.\par She has had a number of endometrial biopsies for complex atypical endometrial hyperplasia. Of note, she is treated with a Mirena intrauterine device and megestrol acetate for her complex atypical endometrial hyperplasia.

## 2019-08-27 NOTE — PHYSICAL EXAM
[No Acute Distress] : no acute distress [Alert] : alert [Normal Sclera/Conjunctiva] : normal sclera/conjunctiva [Healthy Appearance] : healthy appearance [Normal Oropharynx] : the oropharynx was normal [No Neck Mass] : no neck mass was observed [Supple] : the neck was supple [No LAD] : no lymphadenopathy [No Thyroid Nodules] : there were no palpable thyroid nodules [Thyroid Not Enlarged] : the thyroid was not enlarged [Normal Rate] : heart rate was normal  [Clear to Auscultation] : lungs were clear to auscultation bilaterally [Normal Rate and Effort] : normal respiratory rhythm and effort [Regular Rhythm] : with a regular rhythm [Normal S1, S2] : normal S1 and S2 [No Stigmata of Cushings Syndrome] : no stigmata of cushings syndrome [Normal Insight/Judgement] : insight and judgment were intact [Normal Gait] : normal gait [Acanthosis Nigricans] : acanthosis nigricans [Kyphosis] : no kyphosis present [de-identified] : no moon facies, no supraclavicular fat pads

## 2019-10-25 ENCOUNTER — APPOINTMENT (OUTPATIENT)
Dept: ENDOCRINOLOGY | Facility: CLINIC | Age: 39
End: 2019-10-25
Payer: COMMERCIAL

## 2019-10-25 ENCOUNTER — RESULT CHARGE (OUTPATIENT)
Age: 39
End: 2019-10-25

## 2019-10-25 ENCOUNTER — MED ADMIN CHARGE (OUTPATIENT)
Age: 39
End: 2019-10-25

## 2019-10-25 VITALS — SYSTOLIC BLOOD PRESSURE: 155 MMHG | DIASTOLIC BLOOD PRESSURE: 108 MMHG | HEART RATE: 85 BPM

## 2019-10-25 VITALS — WEIGHT: 282 LBS | BODY MASS INDEX: 51.58 KG/M2

## 2019-10-25 LAB — GLUCOSE BLDC GLUCOMTR-MCNC: 106

## 2019-10-25 PROCEDURE — 90686 IIV4 VACC NO PRSV 0.5 ML IM: CPT

## 2019-10-25 PROCEDURE — G0008: CPT

## 2019-10-25 PROCEDURE — 97802 MEDICAL NUTRITION INDIV IN: CPT

## 2019-10-25 PROCEDURE — 82947 ASSAY GLUCOSE BLOOD QUANT: CPT | Mod: QW

## 2019-10-25 PROCEDURE — 99215 OFFICE O/P EST HI 40 MIN: CPT | Mod: 25

## 2019-10-25 RX ORDER — DULAGLUTIDE 0.75 MG/.5ML
0.75 INJECTION, SOLUTION SUBCUTANEOUS
Qty: 1 | Refills: 0 | Status: COMPLETED | OUTPATIENT
Start: 2019-10-25 | End: 2019-11-24

## 2019-10-28 ENCOUNTER — TRANSCRIPTION ENCOUNTER (OUTPATIENT)
Age: 39
End: 2019-10-28

## 2019-10-28 LAB
ESTIMATED AVERAGE GLUCOSE: 143 MG/DL
HBA1C MFR BLD HPLC: 6.6 %
TSH SERPL-ACNC: 1.98 UIU/ML
VIT B12 SERPL-MCNC: 694 PG/ML

## 2019-10-28 NOTE — PHYSICAL EXAM
[Alert] : alert [No Acute Distress] : no acute distress [Healthy Appearance] : healthy appearance [Normal Sclera/Conjunctiva] : normal sclera/conjunctiva [No Neck Mass] : no neck mass was observed [Normal Oropharynx] : the oropharynx was normal [Supple] : the neck was supple [Thyroid Not Enlarged] : the thyroid was not enlarged [No LAD] : no lymphadenopathy [No Thyroid Nodules] : there were no palpable thyroid nodules [Normal Rate and Effort] : normal respiratory rhythm and effort [Clear to Auscultation] : lungs were clear to auscultation bilaterally [Normal Rate] : heart rate was normal  [Normal S1, S2] : normal S1 and S2 [Regular Rhythm] : with a regular rhythm [No Stigmata of Cushings Syndrome] : no stigmata of cushings syndrome [Normal Gait] : normal gait [Acanthosis Nigricans] : acanthosis nigricans [Normal Insight/Judgement] : insight and judgment were intact [Kyphosis] : no kyphosis present [de-identified] : no moon facies, no supraclavicular fat pads

## 2019-10-28 NOTE — ASSESSMENT
[FreeTextEntry1] : Elevated body mass index. Comorbidity of elevated HbA1c. HbA1c 6.1% in August 2019 and blood glucose 106 mg/dL today. We discussed the importance of diet and exercise and lifestyle modification for weight loss. We discussed follow-up with nutrition. We discussed pharmacologic options for weight loss. She is tolerating metformin and we will continue. She will discuss with her gynecologic oncologist if substitution of megestrol acetate for another progesterone may be appropriate, since megestrol acetate is an appetite stimulant. She did not tolerate prior phentermine. She tolerated prior bupropion for treatment of depression/anxiety, but defer at this time due to untreated hypertension. She is amenable to a GLP-1 receptor agonist. We discussed the risks and benefits of the GLP-1 receptor agonist class, including but not limited to nausea, pancreatitis, medullary thyroid cancer. We reviewed subcutaneous injection technique, storage, and sharps disposal.\par Check HbA1c, vitamin B12\par Lifestyle modification\par Follow-up with nutrition\par Start Trulicity 0.75 mg weekly if covered by insurance (samples given)\par \par Thyroid nodules. Her last thyroid ultrasound in June 2018 demonstrated a 2.0 x 1.3 x 1.5 cm left inferior pole nodule and a right 1.0 x 0.5 x 1.0 cm right inferior nodule without suspicious features. Fine needle aspiration of the left inferior nodule in June 2018 was [place] II (benign). She has been clinically and biochemically euthyroid. \par Interval thyroid ultrasound\par Check thyroid stimulating hormone\par \par Polycystic ovarian syndrome. She was diagnosed with polycystic ovarian syndrome in the setting of oligomenorrhea, polycystic ovaries on imaging, and biochemical/clinical evidence of hyperandrogenism. Previous evaluation for other causes of oligomenorrhea was unrevealing. She is treated with a Mirena intrauterine device and megestrol acetate for her complex atypical endometrial hyperplasia.\par \par Hypertension. Advise follow-up with primary care provider as soon as possible. \par \par Influenza vaccine administered. \par \par Return to see me in 2 months.

## 2019-10-28 NOTE — HISTORY OF PRESENT ILLNESS
[FreeTextEntry1] : Ms. Wolf is a 39 year-old woman with a history of polycystic ovarian syndrome, elevated body mass index, elevated HbA1c, thyroid nodules, asthma, seasonal allergies, complex atypical endometrial hyperplasia presenting for follow-up of her endocrine issues. I saw her for an initial visit in August 2019; she is a former patient of Eduin Vásquez and [facility]rell. Of note, she is treated with a Mirena intrauterine device and megestrol acetate for her complex atypical endometrial hyperplasia.\par \par Elevated body mass index. Comorbidity of elevated HbA1c. HbA1c 6.1% in August 2019 and blood glucose 106 mg/dL today.\par Her young adult weight was around 200 pounds. She gained weight in college and then on subsequent prior antidepressant therapy. Her current weight in the 280 pound range is her highest weight. \par She was on phentermine in the past but did not tolerate due to palpitations. \par She was on bupropion in the past for depression/anxiety, off for a few years. \par She is taking metformin ER 1000 mg twice daily and tolerating well. \par \par Multiple thyroid nodules.\par She was diagnosed with thyroid nodules in 2017 on physical examination, confirmed with thyroid ultrasound.\par Her last thyroid ultrasound in June 2018 demonstrated a 2.0 x 1.3 x 1.5 cm left inferior pole nodule and a right 1.0 x 0.5 x 1.0 cm right inferior nodule without suspicious features. \par Fine needle aspiration of the left inferior nodule in June 2018 was [place] II (benign).\par She has been clinically and biochemically euthyroid. \par Her mother has a history of goiter.\par \par Polycystic ovarian syndrome.\par She was diagnosed with polycystic ovarian syndrome in the setting of oligomenorrhea, polycystic ovaries on imaging, and biochemical/clinical evidence of hyperandrogenism. She has hirsutism above her lip and chin that she plucks. \par Previous evaluation for other causes of oligomenorrhea was unrevealing. \par She was treated with a combined oral contraceptive pill for about four years and has been off since around 2002.\par She has had a number of endometrial biopsies for complex atypical endometrial hyperplasia. Of note, she is treated with a Mirena intrauterine device and megestrol acetate for her complex atypical endometrial hyperplasia.\par \par Interim History \par She saw Ana Maria Fernandez/nutrition today. \par She is scheduled to see her gynecologic oncologist in November.\par Weight is up 2 pounds from her last visit here.  \par Medical and surgical history, medications, allergies, social and family history reviewed and updated as needed.

## 2019-10-28 NOTE — ADDENDUM
[FreeTextEntry1] : Recent test results as below. HbA1c 6.6%, consistent with type 2 diabetes mellitus. I recommend initiation of Trulicity for glycemic control and weight loss as above. TSH and vitamin B12 within range. I left a telephone message for call back and will send a Portal message with results. 10/28/19

## 2019-11-22 ENCOUNTER — LABORATORY RESULT (OUTPATIENT)
Age: 39
End: 2019-11-22

## 2019-11-22 ENCOUNTER — APPOINTMENT (OUTPATIENT)
Dept: GYNECOLOGIC ONCOLOGY | Facility: CLINIC | Age: 39
End: 2019-11-22
Payer: COMMERCIAL

## 2019-11-22 VITALS
HEART RATE: 102 BPM | WEIGHT: 280 LBS | BODY MASS INDEX: 51.53 KG/M2 | HEIGHT: 62 IN | DIASTOLIC BLOOD PRESSURE: 76 MMHG | SYSTOLIC BLOOD PRESSURE: 130 MMHG | OXYGEN SATURATION: 98 %

## 2019-11-22 PROCEDURE — 99215 OFFICE O/P EST HI 40 MIN: CPT | Mod: 25

## 2019-11-22 PROCEDURE — 58100 BIOPSY OF UTERUS LINING: CPT

## 2019-11-22 NOTE — HISTORY OF PRESENT ILLNESS
[FreeTextEntry1] : Problem\par 1)Complex atypical hyperplasia \par \par Previous Therapy\par 1)EMC 3/26/19\par    a)Endometrial polyp curettage- Fragments of endometrium with complex atypical hyperplasia\par    b)EMC- Fragments of endometrium with complex atypical hyperplasia \par 2) Mirena IUD 5/3/19\par 3) Pelvic US 6/20/19 \par    a) IUD is identified within the endometrium. endometrium appears markedly thickened\par 4) EMB 8/28/2019\par    a) Rare foci of complex atypical hyperplasia in a background of exogenous hormone-related change.

## 2019-11-22 NOTE — PHYSICAL EXAM
[Normal] : Recto-Vaginal Exam: Normal [de-identified] : morbid obesity, nontender [de-identified] : IUD strings not visualized. Nontender [de-identified] : could not appreciate 2' body habitus [de-identified] : cannot appreciate 2' to body habitus

## 2019-11-22 NOTE — PROCEDURE
[Endometrial Biopsy] : an endometrial biopsy [Other: ___] : [unfilled] [Patient] : the patient [None] : none [Betadine] : betadine [Yes] : the specimen was sent to pathology [No Complications] : none [Tolerated Well] : the patient tolerated the procedure well [Post procedure instructions and information given] : post procedure instructions and information given and reviewed with patient. [FreeTextEntry1] : Pipelle passed to 9 cm, two passes with adequate tissue.

## 2019-11-22 NOTE — ASSESSMENT
[FreeTextEntry1] : Patient doing well overall. We discussed possible biopsy results and recommended management for each possibility. If regression confirmed, will continue current management. If progression or persistence will increase megace dose. \par \par Questions answered. Compliance with treatment emphasized.\par \par [] EMBx\par [] Megace refills sent to pharmacy\par [] Follow up genetics referral\par [] Follow up in 3 months.

## 2019-12-05 ENCOUNTER — RX RENEWAL (OUTPATIENT)
Age: 39
End: 2019-12-05

## 2019-12-20 ENCOUNTER — APPOINTMENT (OUTPATIENT)
Dept: ENDOCRINOLOGY | Facility: CLINIC | Age: 39
End: 2019-12-20
Payer: COMMERCIAL

## 2019-12-20 VITALS
HEIGHT: 62 IN | DIASTOLIC BLOOD PRESSURE: 99 MMHG | HEART RATE: 106 BPM | BODY MASS INDEX: 51.16 KG/M2 | SYSTOLIC BLOOD PRESSURE: 141 MMHG | WEIGHT: 278 LBS

## 2019-12-20 LAB — GLUCOSE BLDC GLUCOMTR-MCNC: 94

## 2019-12-20 PROCEDURE — 99214 OFFICE O/P EST MOD 30 MIN: CPT | Mod: 25

## 2019-12-20 PROCEDURE — 82962 GLUCOSE BLOOD TEST: CPT

## 2019-12-20 PROCEDURE — 97803 MED NUTRITION INDIV SUBSEQ: CPT

## 2019-12-21 LAB
CREAT SPEC-SCNC: 274 MG/DL
MICROALBUMIN 24H UR DL<=1MG/L-MCNC: 5.2 MG/DL
MICROALBUMIN/CREAT 24H UR-RTO: 19 MG/G

## 2020-01-07 ENCOUNTER — APPOINTMENT (OUTPATIENT)
Dept: PEDIATRIC MEDICAL GENETICS | Facility: CLINIC | Age: 40
End: 2020-01-07
Payer: COMMERCIAL

## 2020-01-07 DIAGNOSIS — Z80.0 FAMILY HISTORY OF MALIGNANT NEOPLASM OF DIGESTIVE ORGANS: ICD-10-CM

## 2020-01-07 DIAGNOSIS — Z83.49 FAMILY HISTORY OF OTHER ENDOCRINE, NUTRITIONAL AND METABOLIC DISEASES: ICD-10-CM

## 2020-01-07 PROCEDURE — 96040: CPT

## 2020-01-10 PROBLEM — Z83.49 FAMILY HISTORY OF THYROID NODULE: Status: ACTIVE | Noted: 2020-01-10

## 2020-01-10 PROBLEM — Z80.0 FAMILY HISTORY OF MALIGNANT NEOPLASM OF COLON: Status: ACTIVE | Noted: 2020-01-10

## 2020-01-14 ENCOUNTER — FORM ENCOUNTER (OUTPATIENT)
Age: 40
End: 2020-01-14

## 2020-01-15 ENCOUNTER — APPOINTMENT (OUTPATIENT)
Dept: INTERNAL MEDICINE | Facility: CLINIC | Age: 40
End: 2020-01-15
Payer: COMMERCIAL

## 2020-01-15 ENCOUNTER — APPOINTMENT (OUTPATIENT)
Dept: ULTRASOUND IMAGING | Facility: HOSPITAL | Age: 40
End: 2020-01-15
Payer: COMMERCIAL

## 2020-01-15 ENCOUNTER — OUTPATIENT (OUTPATIENT)
Dept: OUTPATIENT SERVICES | Facility: HOSPITAL | Age: 40
LOS: 1 days | End: 2020-01-15
Payer: COMMERCIAL

## 2020-01-15 VITALS
WEIGHT: 278 LBS | TEMPERATURE: 98.7 F | SYSTOLIC BLOOD PRESSURE: 118 MMHG | HEIGHT: 62 IN | HEART RATE: 105 BPM | BODY MASS INDEX: 51.16 KG/M2 | OXYGEN SATURATION: 94 % | DIASTOLIC BLOOD PRESSURE: 84 MMHG

## 2020-01-15 DIAGNOSIS — K08.409 PARTIAL LOSS OF TEETH, UNSPECIFIED CAUSE, UNSPECIFIED CLASS: Chronic | ICD-10-CM

## 2020-01-15 DIAGNOSIS — Z98.890 OTHER SPECIFIED POSTPROCEDURAL STATES: Chronic | ICD-10-CM

## 2020-01-15 PROCEDURE — 99213 OFFICE O/P EST LOW 20 MIN: CPT

## 2020-01-15 PROCEDURE — 73564 X-RAY EXAM KNEE 4 OR MORE: CPT | Mod: 26,LT

## 2020-01-15 PROCEDURE — 76536 US EXAM OF HEAD AND NECK: CPT

## 2020-01-15 PROCEDURE — 76536 US EXAM OF HEAD AND NECK: CPT | Mod: 26

## 2020-01-15 PROCEDURE — 73564 X-RAY EXAM KNEE 4 OR MORE: CPT

## 2020-01-15 RX ORDER — MEGESTROL ACETATE 40 MG/1
40 TABLET ORAL DAILY
Qty: 60 | Refills: 3 | Status: COMPLETED | COMMUNITY
Start: 2019-04-19 | End: 2020-01-15

## 2020-01-15 NOTE — ASSESSMENT
[FreeTextEntry1] : 39 y/o female is here with chronic knee pain.\par Obtain xrays. If negative, refer to ortho.\par If it shows arhtiritis, which is most likely given, imsidious onset and obesity, refer to PT.\par

## 2020-01-15 NOTE — HISTORY OF PRESENT ILLNESS
[Musculoskeletal Symptoms Knees] : knee [Episodic] : episodic [Moderate] : moderate [OTC Remedies] : OTC remedies [In the Morning] : in the morning [Worsening] : worsening [FreeTextEntry1] : 6 months [FreeTextEntry5] : NSAIDS [FreeTextEntry8] : Stairs are worse. Sitting down makes it stiff.\par No swelling noted.\par Pain is infrapatellar.\par Hasn't tried ice.

## 2020-01-15 NOTE — PHYSICAL EXAM
[No Joint Swelling] : no joint swelling [Grossly Normal Strength/Tone] : grossly normal strength/tone [Normal] : no acute distress, well nourished, well developed and well-appearing [Normal Affect] : the affect was normal [Normal Gait] : normal gait [de-identified] : neg anterior /posterior drawer sign, tender at infrapatellar region, no issue with ROM

## 2020-01-21 NOTE — HISTORY OF PRESENT ILLNESS
[FreeTextEntry1] : Ms. Wolf is a 39 year-old woman with a history of type 2 diabetes mellitus, polycystic ovarian syndrome, elevated body mass index, thyroid nodules, asthma, seasonal allergies, complex atypical endometrial hyperplasia presenting for follow-up of her endocrine issues. I saw her for an initial visit in August 2019 and last in October; she is a former patient of Eduin Vásquez and [facility]rell. \par \par Type 2 diabetes mellitus. HbA1c 6.6% in October 2019 and blood glucose 94 mg/dL today. No known history of micro- or macrovascular complications.\par She was diagnosed with diabetes in October 2019 by hemoglobin A1c. No hospitalizations for hypo- or hyperglycemia.\par She is currently taking metformin 1000 mg twice daily. She was prescribed Trulicity last visit but has not yet taken.\par She is not on a blood pressure regimen\par She is not on a statin for cholesterol\par Nephrology screening: Due\par Last ophthalmology appointment: Over a year\par Last dental appointment: August 2019, upcoming appointment in February\par She is up-to-date with influenza vaccine\par \par Elevated body mass index. Comorbidity of type 2 diabetes mellitus.\par Her young adult weight was around 200 pounds. She gained weight in college and then on subsequent prior antidepressant therapy. Her current weight in the 280 pound range is her highest weight. \par She was on phentermine in the past but did not tolerate due to palpitations. \par She was on bupropion in the past for depression/anxiety, off for a few years. \par She is taking metformin ER 1000 mg twice daily and tolerating well. \par \par Multiple thyroid nodules.\par She was diagnosed with thyroid nodules in 2017 on physical examination, confirmed with thyroid ultrasound.\par Her last thyroid ultrasound in June 2018 demonstrated a 2.0 x 1.3 x 1.5 cm left inferior pole nodule and a right 1.0 x 0.5 x 1.0 cm right inferior nodule without suspicious features. \par Fine needle aspiration of the left inferior nodule in June 2018 was benign ([place] II).\par She has been clinically and biochemically euthyroid. \par Her mother has a history of goiter.\par \par Polycystic ovarian syndrome.\par She was diagnosed with polycystic ovarian syndrome in the setting of oligomenorrhea, polycystic ovaries on imaging, and biochemical/clinical evidence of hyperandrogenism. She has hirsutism above her lip and chin that she plucks. \par Previous evaluation for other causes of oligomenorrhea was unrevealing. \par She was treated with a combined oral contraceptive pill for about four years and has been off since around 2002.\par She has had a number of endometrial biopsies for complex atypical endometrial hyperplasia. Of note, she is treated with a Mirena intrauterine device and megestrol acetate for her complex atypical endometrial hyperplasia.\par \par Interim History \par Laboratory results from last visit as below. HbA1c 6.6%, consistent with a new diagnosis of type 2 diabetes mellitus. TSH and vitamin B12 within range. I recommended initiation of Trulicity for glycemic control and weight loss. She states she will start after the holidays.\par She saw Dr. Angel; note reviewed. She needs to continue megestrol acetate.\par She is scheduled to see Ana Maria Fernandez/nutrition today.\par Weight is down 4 pounds from her last visit here. She has knee pain and has not been able to be as physically active.\par Medical and surgical history, medications, allergies, social and family history reviewed and updated as needed.

## 2020-01-21 NOTE — ASSESSMENT
[FreeTextEntry1] : Type 2 diabetes mellitus/elevated body mass index. HbA1c 6.6% in October 2019 and blood glucose 94 mg/dL today. No known history of micro- or macrovascular complications. We discussed the importance of diet and exercise and lifestyle modification for glycemic control and weight loss. We discussed follow-up with nutrition. We discussed pharmacologic options for glycemic control and weight loss. She is tolerating metformin and we will continue. She did not tolerate prior phentermine for weight loss. She tolerated prior bupropion for treatment of depression/anxiety, but defer at this time due to untreated hypertension. She is amenable to a GLP-1 receptor agonist. We discussed the risks and benefits of the GLP-1 receptor agonist class, including but not limited to nausea, pancreatitis, medullary thyroid cancer. We reviewed subcutaneous injection technique, storage, and sharps disposal. She states she will start after the holidays.\par Continue metformin 1000 mg twice daily\par Start Trulicity 0.75 mg weekly if covered by insurance (samples given last visit)\par She is not on a blood pressure regimen; blood pressure elevated and recommend follow-up with primary care\par She is not on a statin for cholesterol; may be indicated at age 40\par Nephrology screening: Due\par Last ophthalmology appointment: Over a year and advised appointment\par Last dental appointment: August 2019, upcoming appointment in February\par She is up-to-date with influenza vaccine\par \par Thyroid nodules. Her last thyroid ultrasound in June 2018 demonstrated a 2.0 x 1.3 x 1.5 cm left inferior pole nodule and a right 1.0 x 0.5 x 1.0 cm right inferior nodule without suspicious features. Fine needle aspiration of the left inferior nodule in June 2018 was benign ([place] II). She has been clinically and biochemically euthyroid. \par Interval thyroid ultrasound\par \par Polycystic ovarian syndrome. She was diagnosed with polycystic ovarian syndrome in the setting of oligomenorrhea, polycystic ovaries on imaging, and biochemical/clinical evidence of hyperandrogenism. Previous evaluation for other causes of oligomenorrhea was unrevealing. She is treated with a Mirena intrauterine device and megestrol acetate for her complex atypical endometrial hyperplasia.\par \par Return to see me and nutrition in 2 months.

## 2020-01-21 NOTE — PHYSICAL EXAM
[No Acute Distress] : no acute distress [Alert] : alert [Normal Oropharynx] : the oropharynx was normal [Healthy Appearance] : healthy appearance [Normal Sclera/Conjunctiva] : normal sclera/conjunctiva [No Neck Mass] : no neck mass was observed [No LAD] : no lymphadenopathy [Supple] : the neck was supple [Thyroid Not Enlarged] : the thyroid was not enlarged [Normal Rate and Effort] : normal respiratory rhythm and effort [No Thyroid Nodules] : there were no palpable thyroid nodules [Normal Rate] : heart rate was normal  [Clear to Auscultation] : lungs were clear to auscultation bilaterally [Normal S1, S2] : normal S1 and S2 [Regular Rhythm] : with a regular rhythm [No Stigmata of Cushings Syndrome] : no stigmata of cushings syndrome [Acanthosis Nigricans] : acanthosis nigricans [Normal Gait] : normal gait [Normal Insight/Judgement] : insight and judgment were intact [Kyphosis] : no kyphosis present [de-identified] : no moon facies, no supraclavicular fat pads

## 2020-02-21 ENCOUNTER — LABORATORY RESULT (OUTPATIENT)
Age: 40
End: 2020-02-21

## 2020-02-21 ENCOUNTER — APPOINTMENT (OUTPATIENT)
Dept: GYNECOLOGIC ONCOLOGY | Facility: CLINIC | Age: 40
End: 2020-02-21
Payer: COMMERCIAL

## 2020-02-21 VITALS
WEIGHT: 274 LBS | HEIGHT: 62 IN | BODY MASS INDEX: 50.42 KG/M2 | DIASTOLIC BLOOD PRESSURE: 86 MMHG | SYSTOLIC BLOOD PRESSURE: 124 MMHG

## 2020-02-21 PROCEDURE — 58100 BIOPSY OF UTERUS LINING: CPT

## 2020-02-21 PROCEDURE — 99215 OFFICE O/P EST HI 40 MIN: CPT | Mod: 25

## 2020-02-26 RX ORDER — MEGESTROL ACETATE 40 MG/1
40 TABLET ORAL
Qty: 112 | Refills: 6 | Status: COMPLETED | COMMUNITY
Start: 2019-11-22 | End: 2020-02-26

## 2020-02-26 NOTE — HISTORY OF PRESENT ILLNESS
[FreeTextEntry1] : Problem\par 1)Complex atypical hyperplasia \par \par Previous Therapy\par 1)EMC 3/26/19\par    a)Endometrial polyp curettage- Fragments of endometrium with complex atypical hyperplasia\par    b)EMC- Fragments of endometrium with complex atypical hyperplasia \par 2) Mirena IUD 5/3/19\par 3) Pelvic US 6/20/19 \par    a) IUD is identified within the endometrium. endometrium appears markedly thickened\par 4) EMB 8/28/2019\par    a) Rare foci of complex atypical hyperplasia in a background of exogenous hormone-related change.\par 5) EMB 11/22/2019 \par    a) Endometrium with foci of complex atypical hyperplasia in a background of exogenous hormone related changes.

## 2020-02-26 NOTE — ASSESSMENT
[FreeTextEntry1] : Patient doing well overall. We discussed possible biopsy results and recommended management for each possibility. If regression confirmed, will continue current management. If progression or persistence will increase megace dose. Tumor has been persistently CAH for 10 months despite appropriate treatment. The possibility of a resistent tumor was discussed. \par \par Questions answered. 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 Pompa\par [] Follow up in July\par \par Addendum: Complex atypical hyperplasia still seen on biopsy. She does not appear to be resonding to this dose, although exogenous hormone effect is seen by the pathologist. Will increased megace dose and plan for D&C hysteroscopy with replacement of Mirena IUD at her next surveillance interval. \par

## 2020-02-26 NOTE — PHYSICAL EXAM
[Normal] : Recto-Vaginal Exam: Normal [de-identified] : morbid obesity, nontender [de-identified] : could not appreciate 2' body habitus [de-identified] : IUD strings not visualized. Nontender [de-identified] : cannot appreciate 2' to body habitus

## 2020-02-26 NOTE — REASON FOR VISIT
[FreeTextEntry1] : Here for 3 month follow up, on megace for repeat EMB. Pt reports she has been well. She is getting very light menses, lasting 3 days. Pt reports she is interested in freezing her eggs, previously seen by Dr. Pride 2 years ago. \par \par Pt was seen by Genetics and as per their note: genetic testing reveals No known disease-causing mutations were identified in any of the genes tested. 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 Heterozygous pathogenic variants in either MSH6 or PMS2 cause Handley syndrome. Biallelic pathogenic variants in either gene cause constitutional mismatch repair deficiency (CMMRD). While her mutations are of "unknown significance" I feel in a patient with CAH, that appears somewhat resistant to hormones, that this may actually be of significance. Risk of uterine and colon cancer discussed. \par \par GynHx: as above, Mirena placed 5/3/19\par ObHx: N/A\par PmHx: obesity, asthma\par SurgHx: wisdom teeth, breast biopsy (fibroadenoma), \par Meds: albuterol, ibuprofen, loratidine, multivitamin, metformin\par All: NKDA\par SocHx: lives with partner, , single; social ETOH, no other toxic habits\par FamHx: Mother breast/sarcoma; maternal aunt breast cancer, paternal grandfather prostate\par

## 2020-02-28 ENCOUNTER — APPOINTMENT (OUTPATIENT)
Dept: ENDOCRINOLOGY | Facility: CLINIC | Age: 40
End: 2020-02-28

## 2020-03-03 LAB — HPV HIGH+LOW RISK DNA PNL CVX: NOT DETECTED

## 2020-03-20 ENCOUNTER — NON-APPOINTMENT (OUTPATIENT)
Age: 40
End: 2020-03-20

## 2020-04-03 ENCOUNTER — APPOINTMENT (OUTPATIENT)
Dept: MAMMOGRAPHY | Facility: CLINIC | Age: 40
End: 2020-04-03

## 2020-04-03 ENCOUNTER — APPOINTMENT (OUTPATIENT)
Dept: ENDOCRINOLOGY | Facility: CLINIC | Age: 40
End: 2020-04-03

## 2020-04-16 ENCOUNTER — APPOINTMENT (OUTPATIENT)
Dept: HUMAN REPRODUCTION | Facility: CLINIC | Age: 40
End: 2020-04-16
Payer: COMMERCIAL

## 2020-04-16 PROCEDURE — 99203 OFFICE O/P NEW LOW 30 MIN: CPT

## 2020-04-17 ENCOUNTER — APPOINTMENT (OUTPATIENT)
Dept: GASTROENTEROLOGY | Facility: CLINIC | Age: 40
End: 2020-04-17

## 2020-04-27 ENCOUNTER — APPOINTMENT (OUTPATIENT)
Dept: ENDOCRINOLOGY | Facility: CLINIC | Age: 40
End: 2020-04-27
Payer: COMMERCIAL

## 2020-04-27 PROCEDURE — 99214 OFFICE O/P EST MOD 30 MIN: CPT | Mod: 95

## 2020-04-27 NOTE — ASSESSMENT
[FreeTextEntry1] : Type 2 diabetes mellitus/elevated body mass index. HbA1c 6.6% in October 2019. No known history of micro- or macrovascular complications. We discussed the importance of diet and exercise and lifestyle modification for glycemic control and weight loss. We discussed follow-up with nutrition. We discussed pharmacologic options for glycemic control and weight loss. She is tolerating metformin and we will continue. She was tolerating Trulicity and we will restart. She did not tolerate prior phentermine for weight loss. She tolerated prior bupropion for treatment of depression/anxiety, but defer at this time due to untreated hypertension. She will further consider surgical options for weight loss. \par Continue metformin ER 1000 mg twice daily\par Restart Trulicity 0.75 mg weekly \par She is not on a blood pressure regimen; last blood pressure within range\par She is not on a statin for cholesterol; can address next visit \par Nephrology screening: Urine microalbumin within range in December 2019\par Last ophthalmology appointment: January 2020\par Last dental appointment: February 2020\par She is up-to-date with influenza vaccine\par \par Thyroid nodules. She has been clinically and biochemically euthyroid. Thyroid ultrasound in June 2018 demonstrated a 2.0 x 1.3 x 1.5 cm left inferior pole nodule and a right 1.0 x 0.5 x 1.0 cm right inferior nodule without suspicious features. Fine needle aspiration of the left inferior nodule in June 2018 was benign ([place] II). Thyroid ultrasound from January 2020 demonstrated the right lower pole now 1.1 x 0.6 x 0.9 cm, hypoechoic, with microcalcifications meeting criteria for biopsy. We discussed that approximately 95 percent of all thyroid nodules are caused by benign conditions. We discussed the procedure for fine needle aspiration of thyroid nodules and possible results, including further testing for atypia of undetermined significance. She will call after biopsy to discuss results. \par \par Polycystic ovarian syndrome. She was diagnosed with polycystic ovarian syndrome in the setting of oligomenorrhea, polycystic ovaries on imaging, and biochemical/clinical evidence of hyperandrogenism. Previous evaluation for other causes of oligomenorrhea was unrevealing. She is treated with a Mirena intrauterine device and megestrol acetate for her complex atypical endometrial hyperplasia.\par \par Return to see me and nutrition in 2-3 months.

## 2020-04-27 NOTE — PHYSICAL EXAM
[Alert] : alert [Healthy Appearance] : healthy appearance [No Acute Distress] : no acute distress [Normal Insight/Judgement] : insight and judgment were intact

## 2020-04-27 NOTE — DATA REVIEWED
[FreeTextEntry1] : Thyroid ultrasound (January 15, 2020) reviewed and significant for:\par RIGHT LOBE:\par Dimensions: 5.1 x 1.4 x 1.9 cm (sagittal x AP x transverse)\par Echotexture: homogeneous\par Vascularity: normovascular\par The right lobe contains two visible nodules.\par \par Nodule 1:\par Location: Lower pole \par Dimensions: 1.1 x 0.6 x 0.9 cm (sagittal x AP x transverse), which previously measured 1.0 x 0.5 x 1.0 cm.\par Composition: Primarily cystic with a small calcified solid component.\par For solid nodules or for the solid portion of a partially cystic nodule, does the solid portion have any of the following suspicious features?\par -  YES: Hypoechoic\par -  Yes: Microcalcifications\par -  No: Irregular margin (infiltrative or microlobulated)\par -  No: Taller than wide (AP dimension > transverse)\par -  No: Extrathyroidal extension\par -  No: Interrupted rim calcification with soft tissue extrusion\par \par Nodule 2:\par Location: Interpolar \par Dimensions: 0.6 x 0.5 x 0.6 cm (sagittal x AP x transverse), which was not previously seen.\par Composition: Solid with a cystic rim\par For solid nodules or for the solid portion of a partially cystic nodule, does the solid portion have any of the following suspicious features?\par -  NO: Hypoechoic\par -  Yes: Microcalcifications\par -  No: Irregular margin (infiltrative or microlobulated)\par -  No: Taller than wide (AP dimension > transverse)\par -  No: Extrathyroidal extension\par -  No: Interrupted rim calcification with soft tissue extrusion\par \par LEFT LOBE:\par Dimensions: 5.3 x 1.4 x 1.8 cm (sagittal x AP x transverse)\par Echotexture: homogeneous\par Vascularity: normovascular\par The left lobe contains one visible nodule.\par \par Nodule 1:\par Location: Lower pole \par Dimensions: 1.6 x 1.3 x 1.6 cm (sagittal x AP x transverse), which previously measured 2.0 x 1.3 x 1.5 cm.\par Composition: Spongiform\par For solid nodules or for the solid portion of a partially cystic nodule, does the solid portion have any of the following suspicious features?\par -  No: Hypoechoic\par -  No: Microcalcifications\par -  No: Irregular margin (infiltrative or microlobulated)\par -  No: Taller than wide (AP dimension > transverse)\par -  No: Extrathyroidal extension\par -  No: Interrupted rim calcification with soft tissue extrusion\par \par ISTHMUS:\par Dimensions: 0.3 cm AP\par The isthmus lobe contains no visible nodules.\par \par CERVICAL LYMPH NODE EVALUATION (for any abnormal lymph node, please note the following: location, size, calcification, cystic area, absence of central hilum, round shape, abnormal blood flow): \par -  Bilateral prominent lymph nodes all of which demonstrate benign galileo architecture.\par \par PARATHYROID EXAMINATION:\par -  Parathyroid glands not visualized.\par \par IMPRESSION:\par Multinodular thyroid gland as above. There is a hypoechoic solid 1.1 cm nodule noted with microcalcification within the lower pole of the right thyroid lobe which meets criteria for ultrasound-guided FNA.

## 2020-04-27 NOTE — HISTORY OF PRESENT ILLNESS
[Medical Office: ([facility])___] : at the medical office located in  [Patient] : the patient [Home] : at home, [unfilled] , at the time of the visit. [FreeTextEntry1] : Ms. Wolf is a 40 year-old woman with a history of type 2 diabetes mellitus, polycystic ovarian syndrome, elevated body mass index, thyroid nodules, asthma, seasonal allergies, complex atypical endometrial hyperplasia presenting for follow-up of her endocrine issues. I saw her for an initial visit in August 2019 and last in December; she is a former patient of Eduin Vásquez and Darrell. \par \par Type 2 diabetes mellitus. HbA1c 6.6% in October 2019. No known history of micro- or macrovascular complications.\par She was diagnosed with diabetes in October 2019 by hemoglobin A1c. No hospitalizations for hypo- or hyperglycemia.\par She has been taking metformin 1000 mg twice daily and Trulicity 0.75 mg weekly (off due to lapsed prescription)\par She is not on a blood pressure regimen\par She is not on a statin for cholesterol\par Nephrology screening: Urine microalbumin within range in December 2019\par Last ophthalmology appointment: January 2020\par Last dental appointment: February 2020\par She is up-to-date with influenza vaccine\par \par Elevated body mass index. Comorbidity of type 2 diabetes mellitus.\par Her young adult weight was around 200 pounds. She gained weight in college and then on subsequent prior antidepressant therapy. Her weight in the 280 pound range was her highest weight. \par She was on phentermine in the past but did not tolerate due to palpitations. \par She was on bupropion in the past for depression/anxiety, off for a few years. \par She has been taking metformin 1000 mg twice daily and Trulicity 0.75 mg weekly and tolerating well. \par \par Multiple thyroid nodules.\par She was diagnosed with thyroid nodules in 2017 on physical examination, confirmed with thyroid ultrasound.\par Thyroid ultrasound in June 2018 demonstrated a 2.0 x 1.3 x 1.5 cm left inferior pole nodule and a right 1.0 x 0.5 x 1.0 cm right inferior nodule without suspicious features. \par Fine needle aspiration of the left inferior nodule in June 2018 was benign ([place] II).\par Thyroid ultrasound from January 2020 with a right lower pole 1.1 x 0.6 x 0.9 cm hypoechoic nodule with microcalcifications meeting criteria for biopsy. \par She has been clinically and biochemically euthyroid. \par Her mother has a history of goiter.\par \par Polycystic ovarian syndrome.\par She was diagnosed with polycystic ovarian syndrome in the setting of oligomenorrhea, polycystic ovaries on imaging, and biochemical/clinical evidence of hyperandrogenism. She has hirsutism above her lip and chin that she plucks. \par Previous evaluation for other causes of oligomenorrhea was unrevealing. \par She was treated with a combined oral contraceptive pill for about four years and has been off since around 2002.\par She has had a number of endometrial biopsies for complex atypical endometrial hyperplasia. Of note, she is treated with a Mirena intrauterine device and megestrol acetate for her complex atypical endometrial hyperplasia.\par \par Interim History \par Urine microalbumin from last visit within range. \par We started Trulicity 0.75 mg weekly in December 2019. She had nausea for two weeks, subsequently improved. She is now off due to lapsed prescription. \par Thyroid ultrasound from January 2020 with a right lower pole 1.1 x 0.6 x 0.9 cm hypoechoic nodule with microcalcifications meeting criteria for biopsy. She did not return my telephone call to discuss.\par She saw Ana Maria Fernandez/nutrition in early April.\par She has been doing physical therapy exercises for 30 minutes every other day. \par Her weight was down to 267 pounds before quarantine; she believes her weight has subsequently increased since that time but she has not weighed herself. She has knee pain and has not been able to be as physically active.\par Medical and surgical history, medications, allergies, social and family history reviewed and updated as needed.

## 2020-06-26 ENCOUNTER — APPOINTMENT (OUTPATIENT)
Dept: GYNECOLOGIC ONCOLOGY | Facility: CLINIC | Age: 40
End: 2020-06-26
Payer: COMMERCIAL

## 2020-06-26 VITALS
DIASTOLIC BLOOD PRESSURE: 82 MMHG | HEIGHT: 62 IN | SYSTOLIC BLOOD PRESSURE: 128 MMHG | WEIGHT: 290 LBS | BODY MASS INDEX: 53.37 KG/M2

## 2020-06-26 PROCEDURE — 99215 OFFICE O/P EST HI 40 MIN: CPT

## 2020-06-26 NOTE — PHYSICAL EXAM
[Normal] : Recto-Vaginal Exam: Normal [de-identified] : IUD strings not visualized. Nontender [de-identified] : morbid obesity, nontender [de-identified] : could not appreciate 2' body habitus [de-identified] : cannot appreciate 2' to body habitus

## 2020-06-26 NOTE — HISTORY OF PRESENT ILLNESS
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 endometrium appears markedly thickened\par 4) EMB 8/28/2019\par    a) Rare foci of complex atypical hyperplasia in a background of exogenous hormone-related change.\par 5) EMB 11/22/2019 \par    a) Endometrium with foci of complex atypical hyperplasia in a background of exogenous hormone related changes. \par 6) EMB 2/27/2020\par     a) CAH\par

## 2020-06-26 NOTE — ASSESSMENT
[FreeTextEntry1] : I discussed with the patient with the aid of diagrams, reviewed the findings on history and physical examination, and reviewed the imaging studies in detail. She has a thickened endometrial lining and abnormal uterine bleeding. \par \par Benign, pre-malignant (such as atypical hyperplasia) and malignant causes of these findings discussed. In order to determine the cause of her symptoms, sampling of the uterus is necessary. The uterus can be sampled either by an in-office endometrial biopsy or via D&C hysteroscopy. In the case of suspected endometrial polyp, a D&C/hysteroscopy is indicated. This allows for removal of the endometrial polyp. This is both diagnostic and therapeutic. \par \par Complications that include, but are not limited to: bleeding, infection and uterine perforation discussed. In the case of uterine perforation, diagnostic laparoscopy may be indicated. I have also provided her with the diagrams. \par \par Surgical scheduling was discussed and instructions for optimization prior to surgery were given. NPO after midnight.  No aspirin or NSAID products for 1 week prior. Instructions for misoprostol 48 hours prior to surgery given.  A copy of the above diagrams was given to the patient. \par \par The total encounter time was ## minutes, of which over 50% was spent face-to-face, examining and counseling the patient, or coordinating care.  \par \par \par Questions answered. Compliance with treatment emphasized.\par [][place] VUS downgraded to normal result, discussed significance\par [] [facility] referral: patient has seen Dr. Pompa. Next follow up would be after a normal EMB\par [] D&C, hysteroscopy, Mirena removal/ replacement 7/10 with Dr. Shepherd\par []Medical Clearance\par []COVID testing 7/7\par []Pelvic US pre op\par \par

## 2020-06-26 NOTE — REASON FOR VISIT
[FreeTextEntry1] : Here for 3 month follow up, on megace  160 q12 for follow up and D&C planning. Feeling well, ahs not been able to lose any weight on the megace. Still having regular menses, but light. \par Of note - Here VUS was downgraded to a benign variant - patient has normal genetic testing results now. \par \par GynHx: as above, Mirena placed 5/3/19\par ObHx: N/A\par PmHx: obesity, asthma\par SurgHx: wisdom teeth, breast biopsy (fibroadenoma), \par Meds: albuterol, ibuprofen, loratidine, multivitamin, metformin\par All: NKDA\par SocHx: lives with partner, , single; social ETOH, no other toxic habits\par FamHx: Mother breast/sarcoma; maternal aunt breast cancer, paternal grandfather prostate\par

## 2020-06-29 LAB
ALBUMIN SERPL ELPH-MCNC: 4.8 G/DL
ALP BLD-CCNC: 38 U/L
ALT SERPL-CCNC: 16 U/L
ANION GAP SERPL CALC-SCNC: 15 MMOL/L
APTT BLD: 35.3 SEC
AST SERPL-CCNC: 14 U/L
BASOPHILS # BLD AUTO: 0.05 K/UL
BASOPHILS NFR BLD AUTO: 0.4 %
BILIRUB SERPL-MCNC: 0.3 MG/DL
BUN SERPL-MCNC: 11 MG/DL
CALCIUM SERPL-MCNC: 9.9 MG/DL
CHLORIDE SERPL-SCNC: 101 MMOL/L
CO2 SERPL-SCNC: 24 MMOL/L
CREAT SERPL-MCNC: 0.81 MG/DL
EOSINOPHIL # BLD AUTO: 0.28 K/UL
EOSINOPHIL NFR BLD AUTO: 2.3 %
ESTIMATED AVERAGE GLUCOSE: 131 MG/DL
HBA1C MFR BLD HPLC: 6.2 %
HCT VFR BLD CALC: 46.5 %
HGB BLD-MCNC: 14.8 G/DL
IMM GRANULOCYTES NFR BLD AUTO: 0.7 %
INR PPP: 0.96 RATIO
LYMPHOCYTES # BLD AUTO: 2.89 K/UL
LYMPHOCYTES NFR BLD AUTO: 24 %
MAN DIFF?: NORMAL
MCHC RBC-ENTMCNC: 29 PG
MCHC RBC-ENTMCNC: 31.8 GM/DL
MCV RBC AUTO: 91.2 FL
MONOCYTES # BLD AUTO: 0.76 K/UL
MONOCYTES NFR BLD AUTO: 6.3 %
NEUTROPHILS # BLD AUTO: 7.98 K/UL
NEUTROPHILS NFR BLD AUTO: 66.3 %
PLATELET # BLD AUTO: 397 K/UL
POTASSIUM SERPL-SCNC: 4.5 MMOL/L
PROT SERPL-MCNC: 7.5 G/DL
PT BLD: 11 SEC
RBC # BLD: 5.1 M/UL
RBC # FLD: 13.2 %
SODIUM SERPL-SCNC: 140 MMOL/L
WBC # FLD AUTO: 12.04 K/UL

## 2020-07-07 ENCOUNTER — RESULT REVIEW (OUTPATIENT)
Age: 40
End: 2020-07-07

## 2020-07-07 ENCOUNTER — OUTPATIENT (OUTPATIENT)
Dept: OUTPATIENT SERVICES | Facility: HOSPITAL | Age: 40
LOS: 1 days | End: 2020-07-07

## 2020-07-07 ENCOUNTER — APPOINTMENT (OUTPATIENT)
Dept: ULTRASOUND IMAGING | Facility: CLINIC | Age: 40
End: 2020-07-07
Payer: COMMERCIAL

## 2020-07-07 ENCOUNTER — APPOINTMENT (OUTPATIENT)
Dept: INTERNAL MEDICINE | Facility: CLINIC | Age: 40
End: 2020-07-07
Payer: COMMERCIAL

## 2020-07-07 VITALS
SYSTOLIC BLOOD PRESSURE: 132 MMHG | HEIGHT: 62 IN | OXYGEN SATURATION: 98 % | TEMPERATURE: 98.4 F | DIASTOLIC BLOOD PRESSURE: 78 MMHG | WEIGHT: 287 LBS | BODY MASS INDEX: 52.81 KG/M2 | HEART RATE: 107 BPM

## 2020-07-07 DIAGNOSIS — G56.01 CARPAL TUNNEL SYNDROME, RIGHT UPPER LIMB: ICD-10-CM

## 2020-07-07 DIAGNOSIS — R74.0 NONSPECIFIC ELEVATION OF LEVELS OF TRANSAMINASE AND LACTIC ACID DEHYDROGENASE [LDH]: ICD-10-CM

## 2020-07-07 DIAGNOSIS — K80.20 CALCULUS OF GALLBLADDER W/OUT CHOLECYSTITIS W/OUT OBSTRUCTION: ICD-10-CM

## 2020-07-07 DIAGNOSIS — K08.409 PARTIAL LOSS OF TEETH, UNSPECIFIED CAUSE, UNSPECIFIED CLASS: Chronic | ICD-10-CM

## 2020-07-07 DIAGNOSIS — Z98.890 OTHER SPECIFIED POSTPROCEDURAL STATES: Chronic | ICD-10-CM

## 2020-07-07 DIAGNOSIS — Z87.39 PERSONAL HISTORY OF OTHER DISEASES OF THE MUSCULOSKELETAL SYSTEM AND CONNECTIVE TISSUE: ICD-10-CM

## 2020-07-07 DIAGNOSIS — M25.562 PAIN IN LEFT KNEE: ICD-10-CM

## 2020-07-07 DIAGNOSIS — R73.01 IMPAIRED FASTING GLUCOSE: ICD-10-CM

## 2020-07-07 LAB
BASOPHILS # BLD AUTO: 0.05 K/UL
BASOPHILS NFR BLD AUTO: 0.5 %
EOSINOPHIL # BLD AUTO: 0.27 K/UL
EOSINOPHIL NFR BLD AUTO: 2.9 %
HCT VFR BLD CALC: 45.2 %
HGB BLD-MCNC: 14.7 G/DL
IMM GRANULOCYTES NFR BLD AUTO: 0.4 %
LYMPHOCYTES # BLD AUTO: 2.63 K/UL
LYMPHOCYTES NFR BLD AUTO: 28.6 %
MAN DIFF?: NORMAL
MCHC RBC-ENTMCNC: 29.2 PG
MCHC RBC-ENTMCNC: 32.5 GM/DL
MCV RBC AUTO: 89.9 FL
MONOCYTES # BLD AUTO: 0.59 K/UL
MONOCYTES NFR BLD AUTO: 6.4 %
NEUTROPHILS # BLD AUTO: 5.61 K/UL
NEUTROPHILS NFR BLD AUTO: 61.2 %
PLATELET # BLD AUTO: 379 K/UL
RBC # BLD: 5.03 M/UL
RBC # FLD: 13.5 %
WBC # FLD AUTO: 9.19 K/UL

## 2020-07-07 PROCEDURE — 76856 US EXAM PELVIC COMPLETE: CPT | Mod: 26,59

## 2020-07-07 PROCEDURE — 99212 OFFICE O/P EST SF 10 MIN: CPT | Mod: 25

## 2020-07-07 PROCEDURE — 99396 PREV VISIT EST AGE 40-64: CPT | Mod: 25

## 2020-07-07 PROCEDURE — 36415 COLL VENOUS BLD VENIPUNCTURE: CPT

## 2020-07-07 PROCEDURE — 76830 TRANSVAGINAL US NON-OB: CPT | Mod: 26

## 2020-07-07 NOTE — HEALTH RISK ASSESSMENT
[Good] : ~his/her~  mood as  good [No falls in past year] : Patient reported no falls in the past year [0] : 2) Feeling down, depressed, or hopeless: Not at all (0) [None] : None [Fully functional (bathing, dressing, toileting, transferring, walking, feeding)] : Fully functional (bathing, dressing, toileting, transferring, walking, feeding) [Fully functional (using the telephone, shopping, preparing meals, housekeeping, doing laundry, using] : Fully functional and needs no help or supervision to perform IADLs (using the telephone, shopping, preparing meals, housekeeping, doing laundry, using transportation, managing medications and managing finances) [[id number]] : 0 [Language] : denies difficulty with language [Change in mental status noted] : No change in mental status noted [Behavior] : denies difficulty with behavior [Learning/Retaining New Information] : denies difficulty learning/retaining new information [Handling Complex Tasks] : denies difficulty handling complex tasks [Reasoning] : denies difficulty with reasoning [Spatial Ability and Orientation] : denies difficulty with spatial ability and orientation

## 2020-07-07 NOTE — PHYSICAL EXAM
[Normal] : no acute distress, well nourished, well developed and well-appearing [Normal Outer Ear/Nose] : the outer ears and nose were normal in appearance [Normal Sclera/Conjunctiva] : normal sclera/conjunctiva [No JVD] : no jugular venous distention [No Lymphadenopathy] : no lymphadenopathy [Supple] : supple [No Accessory Muscle Use] : no accessory muscle use [Clear to Auscultation] : lungs were clear to auscultation bilaterally [Normal Rate] : normal rate  [Regular Rhythm] : with a regular rhythm [Pedal Pulses Present] : the pedal pulses are present [No Extremity Clubbing/Cyanosis] : no extremity clubbing/cyanosis [No Edema] : there was no peripheral edema [Normal Appearance] : normal in appearance [No Nipple Discharge] : no nipple discharge [No Masses] : no palpable masses [Non Tender] : non-tender [Soft] : abdomen soft [No HSM] : no HSM [Non-distended] : non-distended [Normal Bowel Sounds] : normal bowel sounds [No Joint Swelling] : no joint swelling [Normal Gait] : normal gait [No Rash] : no rash [Normal Affect] : the affect was normal [Alert and Oriented x3] : oriented to person, place, and time [de-identified] : obese

## 2020-07-07 NOTE — HISTORY OF PRESENT ILLNESS
[de-identified] : 41 y/o female with diabetes is here for history and physical prior to D&C/hysteroscopy/IUD removal and replacement.\par Generally doing well.\par Diabetes better controlled.\par Due for MAMMO.\par Had preop labs which showed mild elevated WBC-will repeat today.\par Asthma well controlled-not using Ventolin since winter.

## 2020-07-07 NOTE — ASSESSMENT
[FreeTextEntry1] : 40 year is here for a CPE/preop clearance. She was counselled on diet and exercise, drug and alcohol use, age appropriate health care maintenance including vaccines, seatbelts, sunscreen, stress reduction and safe sex. All questions asked/answered to the best of my ability.\par She is cleared to proceed with surgery once labs resulted.\par \par

## 2020-07-15 ENCOUNTER — APPOINTMENT (OUTPATIENT)
Dept: GYNECOLOGIC ONCOLOGY | Facility: CLINIC | Age: 40
End: 2020-07-15
Payer: COMMERCIAL

## 2020-07-18 ENCOUNTER — LABORATORY RESULT (OUTPATIENT)
Age: 40
End: 2020-07-18

## 2020-07-20 ENCOUNTER — APPOINTMENT (OUTPATIENT)
Dept: GYNECOLOGIC ONCOLOGY | Facility: CLINIC | Age: 40
End: 2020-07-20
Payer: COMMERCIAL

## 2020-07-20 ENCOUNTER — TRANSCRIPTION ENCOUNTER (OUTPATIENT)
Age: 40
End: 2020-07-20

## 2020-07-20 VITALS
SYSTOLIC BLOOD PRESSURE: 129 MMHG | HEART RATE: 104 BPM | WEIGHT: 285.5 LBS | HEIGHT: 62 IN | RESPIRATION RATE: 18 BRPM | DIASTOLIC BLOOD PRESSURE: 97 MMHG | TEMPERATURE: 96 F | OXYGEN SATURATION: 97 %

## 2020-07-20 VITALS
BODY MASS INDEX: 53.37 KG/M2 | DIASTOLIC BLOOD PRESSURE: 74 MMHG | SYSTOLIC BLOOD PRESSURE: 129 MMHG | HEIGHT: 62 IN | WEIGHT: 290 LBS

## 2020-07-20 DIAGNOSIS — E27.8 OTHER SPECIFIED DISORDERS OF ADRENAL GLAND: ICD-10-CM

## 2020-07-20 PROCEDURE — 99215 OFFICE O/P EST HI 40 MIN: CPT

## 2020-07-20 NOTE — DISCUSSION/SUMMARY
[FreeTextEntry1] : I discussed with the patient with the aid of diagrams, reviewed the findings on history and physical examination, and reviewed the imaging studies in detail. 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 \par \par Benign, pre-malignant (such as atypical hyperplasia) and malignant causes of these findings discussed. In order to determine the cause of her symptoms, sampling of the uterus is necessary. The uterus can be sampled either by an in-office endometrial biopsy or via D&C hysteroscopy. In the case of suspected endometrial polyp, a D&C/hysteroscopy is indicated. This allows for removal of the endometrial polyp. This is both diagnostic and therapeutic. \par \par Complications that include, but are not limited to: bleeding, infection and uterine perforation discussed. In the case of uterine perforation, diagnostic laparoscopy may be indicated. I have also provided her with the diagrams. \par \par Surgical scheduling was discussed and instructions for optimization prior to surgery were given. NPO after midnight.  No aspirin or NSAID products for 1 week prior.  A copy of the above diagrams was given to the patient. \par \par The total encounter time was 45 minutes, of which over 50% was spent face-to-face, examining and counseling the patient, or coordinating care.  \par \par [] [facility] referral: patient has seen Dr. Pompa. Next follow up would be after a normal EMB\par [] D&C, hysteroscopy, Mirena removal/ replacement 7/21\par [] Will refer to Dr. Mata for bariatric surgery \par []Medical Clearance obtained \par []COVID negative \par

## 2020-07-20 NOTE — REASON FOR VISIT
[FreeTextEntry1] : Here for 3 month follow up, on megace  160 q12 for follow up and D&C planning. Feeling well, ahs not been able to lose any weight on the megace. Still having regular menses, but light. \par Pt has gained 16 lbs since last visit in February.   \par \par GynHx: as above, Mirena placed 5/3/19\par ObHx: N/A\par PmHx: obesity, asthma\par SurgHx: wisdom teeth, breast biopsy (fibroadenoma), \par Meds: albuterol, ibuprofen, loratidine, multivitamin, metformin\par All: NKDA\par SocHx: lives with partner, , single; social ETOH, no other toxic habits\par FamHx: Mother breast/sarcoma; maternal aunt breast cancer, paternal grandfather prostate\par

## 2020-07-20 NOTE — ASU PREOP CHECKLIST - SELECT TESTS ORDERED
CBC/PT/PTT/BMP/INR BMP/CBC/Covid negative, 7/18/PT/PTT/INR Covid negative, 7/18  96   urine hcg-neg/BMP/PT/PTT/CBC/INR

## 2020-07-20 NOTE — ASU PATIENT PROFILE, ADULT - PMH
Anxiety    Asthma    Astigmatism  BILAT  Depression    Sciatica  RIGHT NUMBNESS Anxiety    Asthma    Astigmatism  BILAT  Depression    DM (diabetes mellitus)    Endometrial polyp    Fatty liver    HTN (hypertension)    PCOS (polycystic ovarian syndrome)    Sciatica  RIGHT NUMBNESS  Thyromegaly

## 2020-07-20 NOTE — PHYSICAL EXAM
[de-identified] : IUD at external os visualized. Nontender [de-identified] : could not appreciate 2' body habitus [de-identified] : morbid obesity, nontender [de-identified] : cannot appreciate 2' to body habitus [Fully active, able to carry on all pre-disease performance without restriction] : Status 0 - Fully active, able to carry on all pre-disease performance without restriction

## 2020-07-20 NOTE — ASU PATIENT PROFILE, ADULT - PSH
H/O bilateral breast biopsy  BENIGN  History of surgery  THYROID BIOPSY- ENLARGED  [place] teeth extracted H/O bilateral breast biopsy  BENIGN  History of surgery  THYROID BIOPSY- ENLARGED  Surgery, elective  endometrial polyp removal  [place] teeth extracted

## 2020-07-21 ENCOUNTER — OUTPATIENT (OUTPATIENT)
Dept: OUTPATIENT SERVICES | Facility: HOSPITAL | Age: 40
LOS: 1 days | Discharge: HOME | End: 2020-07-21
Payer: COMMERCIAL

## 2020-07-21 ENCOUNTER — RESULT REVIEW (OUTPATIENT)
Age: 40
End: 2020-07-21

## 2020-07-21 ENCOUNTER — APPOINTMENT (OUTPATIENT)
Dept: GYNECOLOGIC ONCOLOGY | Facility: HOSPITAL | Age: 40
End: 2020-07-21

## 2020-07-21 VITALS
OXYGEN SATURATION: 97 % | RESPIRATION RATE: 14 BRPM | DIASTOLIC BLOOD PRESSURE: 71 MMHG | SYSTOLIC BLOOD PRESSURE: 138 MMHG | HEART RATE: 90 BPM

## 2020-07-21 DIAGNOSIS — Z98.890 OTHER SPECIFIED POSTPROCEDURAL STATES: Chronic | ICD-10-CM

## 2020-07-21 DIAGNOSIS — K08.409 PARTIAL LOSS OF TEETH, UNSPECIFIED CAUSE, UNSPECIFIED CLASS: Chronic | ICD-10-CM

## 2020-07-21 DIAGNOSIS — Z41.9 ENCOUNTER FOR PROCEDURE FOR PURPOSES OTHER THAN REMEDYING HEALTH STATE, UNSPECIFIED: Chronic | ICD-10-CM

## 2020-07-21 LAB — GLUCOSE BLDC GLUCOMTR-MCNC: 96 MG/DL — SIGNIFICANT CHANGE UP (ref 70–99)

## 2020-07-21 PROCEDURE — 58558 HYSTEROSCOPY BIOPSY: CPT

## 2020-07-21 PROCEDURE — 82962 GLUCOSE BLOOD TEST: CPT

## 2020-07-21 PROCEDURE — 88300 SURGICAL PATH GROSS: CPT | Mod: 26,59

## 2020-07-21 PROCEDURE — 88305 TISSUE EXAM BY PATHOLOGIST: CPT

## 2020-07-21 PROCEDURE — 58301 REMOVE INTRAUTERINE DEVICE: CPT

## 2020-07-21 PROCEDURE — 88305 TISSUE EXAM BY PATHOLOGIST: CPT | Mod: 26

## 2020-07-21 PROCEDURE — 88300 SURGICAL PATH GROSS: CPT

## 2020-07-21 PROCEDURE — 58120 DILATION AND CURETTAGE: CPT

## 2020-07-21 PROCEDURE — 86901 BLOOD TYPING SEROLOGIC RH(D): CPT

## 2020-07-21 PROCEDURE — 58300 INSERT INTRAUTERINE DEVICE: CPT

## 2020-07-21 PROCEDURE — 86850 RBC ANTIBODY SCREEN: CPT

## 2020-07-21 RX ORDER — ACETAMINOPHEN 500 MG
1000 TABLET ORAL ONCE
Refills: 0 | Status: COMPLETED | OUTPATIENT
Start: 2020-07-21 | End: 2020-07-21

## 2020-07-21 RX ORDER — ACETAMINOPHEN 500 MG
1000 TABLET ORAL EVERY 6 HOURS
Refills: 0 | Status: DISCONTINUED | OUTPATIENT
Start: 2020-07-21 | End: 2020-07-21

## 2020-07-21 RX ORDER — ONDANSETRON 8 MG/1
8 TABLET, FILM COATED ORAL EVERY 8 HOURS
Refills: 0 | Status: DISCONTINUED | OUTPATIENT
Start: 2020-07-21 | End: 2020-07-21

## 2020-07-21 RX ORDER — OXYCODONE HYDROCHLORIDE 5 MG/1
5 TABLET ORAL EVERY 4 HOURS
Refills: 0 | Status: DISCONTINUED | OUTPATIENT
Start: 2020-07-21 | End: 2020-07-21

## 2020-07-21 RX ORDER — CELECOXIB 200 MG/1
400 CAPSULE ORAL ONCE
Refills: 0 | Status: COMPLETED | OUTPATIENT
Start: 2020-07-21 | End: 2020-07-21

## 2020-07-21 RX ORDER — GABAPENTIN 400 MG/1
600 CAPSULE ORAL ONCE
Refills: 0 | Status: COMPLETED | OUTPATIENT
Start: 2020-07-21 | End: 2020-07-21

## 2020-07-21 RX ORDER — OXYCODONE HYDROCHLORIDE 5 MG/1
10 TABLET ORAL EVERY 4 HOURS
Refills: 0 | Status: DISCONTINUED | OUTPATIENT
Start: 2020-07-21 | End: 2020-07-21

## 2020-07-21 RX ORDER — HEPARIN SODIUM 5000 [USP'U]/ML
5000 INJECTION INTRAVENOUS; SUBCUTANEOUS ONCE
Refills: 0 | Status: COMPLETED | OUTPATIENT
Start: 2020-07-21 | End: 2020-07-21

## 2020-07-21 RX ORDER — HYDROMORPHONE HYDROCHLORIDE 2 MG/ML
0.5 INJECTION INTRAMUSCULAR; INTRAVENOUS; SUBCUTANEOUS
Refills: 0 | Status: DISCONTINUED | OUTPATIENT
Start: 2020-07-21 | End: 2020-07-21

## 2020-07-21 RX ORDER — SIMETHICONE 80 MG/1
80 TABLET, CHEWABLE ORAL EVERY 6 HOURS
Refills: 0 | Status: DISCONTINUED | OUTPATIENT
Start: 2020-07-21 | End: 2020-07-21

## 2020-07-21 RX ORDER — SODIUM CHLORIDE 9 MG/ML
1000 INJECTION, SOLUTION INTRAVENOUS
Refills: 0 | Status: DISCONTINUED | OUTPATIENT
Start: 2020-07-21 | End: 2020-07-21

## 2020-07-21 RX ADMIN — CELECOXIB 400 MILLIGRAM(S): 200 CAPSULE ORAL at 14:12

## 2020-07-21 RX ADMIN — GABAPENTIN 600 MILLIGRAM(S): 400 CAPSULE ORAL at 14:11

## 2020-07-21 RX ADMIN — Medication 1000 MILLIGRAM(S): at 14:11

## 2020-07-21 RX ADMIN — HEPARIN SODIUM 5000 UNIT(S): 5000 INJECTION INTRAVENOUS; SUBCUTANEOUS at 14:11

## 2020-07-21 NOTE — BRIEF OPERATIVE NOTE - NSICDXBRIEFPROCEDURE_GEN_ALL_CORE_FT
PROCEDURES:  Insertion of Mirena IUD 21-Jul-2020 16:35:13  Amelie Rodriguez  Removal of Mirena IUD 21-Jul-2020 16:35:06  Amelie Rodriguez  Diagnostic hysteroscopy 21-Jul-2020 16:34:57  Amelie Rodriguez

## 2020-07-21 NOTE — BRIEF OPERATIVE NOTE - OPERATION/FINDINGS
Endometrial cavity with thick, lush endometrial tissue and multiple small endometrial polyps. Both ostia visualized

## 2020-07-21 NOTE — BRIEF OPERATIVE NOTE - NSICDXBRIEFPOSTOP_GEN_ALL_CORE_FT
POST-OP DIAGNOSIS:  Uterine polyp 21-Jul-2020 16:36:02  Amelie Rodriguez  Endometrial hyperplasia 21-Jul-2020 16:35:55  Amelie Rodriguez

## 2020-07-22 PROBLEM — K76.0 FATTY (CHANGE OF) LIVER, NOT ELSEWHERE CLASSIFIED: Chronic | Status: ACTIVE | Noted: 2020-07-20

## 2020-07-22 PROBLEM — E01.0 IODINE-DEFICIENCY RELATED DIFFUSE (ENDEMIC) GOITER: Chronic | Status: ACTIVE | Noted: 2020-07-20

## 2020-07-22 PROBLEM — N84.0 POLYP OF CORPUS UTERI: Chronic | Status: ACTIVE | Noted: 2020-07-20

## 2020-07-22 PROBLEM — E28.2 POLYCYSTIC OVARIAN SYNDROME: Chronic | Status: ACTIVE | Noted: 2020-07-20

## 2020-07-22 LAB — SURGICAL PATHOLOGY STUDY: SIGNIFICANT CHANGE UP

## 2020-08-24 ENCOUNTER — APPOINTMENT (OUTPATIENT)
Dept: GYNECOLOGIC ONCOLOGY | Facility: CLINIC | Age: 40
End: 2020-08-24

## 2020-08-28 ENCOUNTER — APPOINTMENT (OUTPATIENT)
Dept: GYNECOLOGIC ONCOLOGY | Facility: CLINIC | Age: 40
End: 2020-08-28
Payer: COMMERCIAL

## 2020-08-28 VITALS
HEIGHT: 62 IN | WEIGHT: 286 LBS | BODY MASS INDEX: 52.63 KG/M2 | OXYGEN SATURATION: 97 % | SYSTOLIC BLOOD PRESSURE: 122 MMHG | HEART RATE: 100 BPM | TEMPERATURE: 98 F | DIASTOLIC BLOOD PRESSURE: 72 MMHG

## 2020-08-28 PROCEDURE — 99215 OFFICE O/P EST HI 40 MIN: CPT

## 2020-08-28 NOTE — PHYSICAL EXAM
[Normal] : Recto-Vaginal Exam: Normal [Fully active, able to carry on all pre-disease performance without restriction] : Status 0 - Fully active, able to carry on all pre-disease performance without restriction [de-identified] : IUD at external os visualized. Nontender [de-identified] : morbid obesity, nontender [de-identified] : could not appreciate 2' body habitus [de-identified] : cannot appreciate 2' to body habitus

## 2020-08-28 NOTE — REASON FOR VISIT
[FreeTextEntry1] : Here for 1 month follow up after D&C. Patient reports she is doing well overall. \par \par We discussed the pathology findings of persistent focal CAH. Definitive vs. continued conservative management discussed. Patient is not yet ready for a hysterectomy as she would like to preserve her fertility. \par \par The role that estrogen and obesity in the pathogenesis of CAH was discussed. She is currently on the maximum doses of progesterone. Importance of weight loss emphasized. Patient would like to pursue a consult with a bariatric surgeon.\par \par GynHx: as above, Mirena placed 5/3/19\par ObHx: N/A\par PmHx: obesity, asthma\par SurgHx: wisdom teeth, breast biopsy (fibroadenoma), \par Meds: albuterol, ibuprofen, loratidine, multivitamin, metformin\par All: NKDA\par SocHx: lives with partner, , single; social ETOH, no other toxic habits\par FamHx: Mother breast/sarcoma; maternal aunt breast cancer, paternal grandfather prostate\par

## 2020-08-28 NOTE — ASSESSMENT
[FreeTextEntry1] : Persistent CAH noted on recent D&C. Risk of progression to endometrial cancer discussed, however since no invasive component was seen, it is appropriate to continue fertility sparing management at this time. \par \par I will discuss fertility preservation options (ex: egg freezing) with Dr. Pompa in the setting of persistent CAH.\par \par Patient given referral for several bariatric surgeons at her last visit and endorses still having their information at home\par \par Follow up in 3 months

## 2020-08-28 NOTE — HISTORY OF PRESENT ILLNESS
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 endometrium appears markedly thickened\par 4) EMB 8/28/2019\par    a) Rare foci of complex atypical hyperplasia in a background of exogenous hormone-related change.\par 5) EMB 11/22/2019 \par    a) Endometrium with foci of complex atypical hyperplasia in a background of exogenous hormone related changes. \par 6) EMB 2/27/2020\par     a) CAH\par 7) Pelvic US 7/7/20\par     a) uterus 8.4cm\par     b) endometrium 0.8cm , prominent echogenicity along its superior extent\par     c) IUD low in position \par 8) D&C hysteroscopy 7/27/20\par     a) Uterus, intrauterine device; removal: -Intrauterine device-IUD (gross examination only).\par     b)  Endometrium, curettings:\par - Endometrium with foci of complex atypical hyperplasia\par - Foci consistent with portions of benign endometrial polyp\par - Extensive areas consistent with progestin therapy effect\par - Acute and chronic endometritis - see note

## 2020-11-03 ENCOUNTER — RX RENEWAL (OUTPATIENT)
Age: 40
End: 2020-11-03

## 2020-11-05 ENCOUNTER — APPOINTMENT (OUTPATIENT)
Dept: HUMAN REPRODUCTION | Facility: CLINIC | Age: 40
End: 2020-11-05
Payer: COMMERCIAL

## 2020-11-05 PROCEDURE — 99214 OFFICE O/P EST MOD 30 MIN: CPT | Mod: 95

## 2020-12-28 ENCOUNTER — NON-APPOINTMENT (OUTPATIENT)
Age: 40
End: 2020-12-28

## 2021-01-01 NOTE — ADDENDUM
[FreeTextEntry1] : Urine microalbumin within range. I will send a letter to Ms. Wolf with this reassuring result. 12/21/19\par \par Recent thyroid ultrasound with a right lower pole 1.1 x 0.6 x 0.9 cm hypoechoic nodule with microcalcifications meeting criteria for biopsy. I left a message for call back and will send the result and order for biopsy to Ms. Wolf by mail. 1/21/20\par \par Thyroid ultrasound (January 15, 2020) reviewed and significant for:\par RIGHT LOBE:\par Dimensions: 5.1 x 1.4 x 1.9 cm (sagittal x AP x transverse)\par Echotexture: homogeneous\par Vascularity: normovascular\par The right lobe contains two visible nodules.\par \par Nodule 1:\par Location: Lower pole \par Dimensions: 1.1 x 0.6 x 0.9 cm (sagittal x AP x transverse), which previously measured 1.0 x 0.5 x 1.0 cm.\par Composition: Primarily cystic with a small calcified solid component.\par For solid nodules or for the solid portion of a partially cystic nodule, does the solid portion have any of the following suspicious features?\par -  YES: Hypoechoic\par -  Yes: Microcalcifications\par -  No: Irregular margin (infiltrative or microlobulated)\par -  No: Taller than wide (AP dimension > transverse)\par -  No: Extrathyroidal extension\par -  No: Interrupted rim calcification with soft tissue extrusion\par \par Nodule 2:\par Location: Interpolar \par Dimensions: 0.6 x 0.5 x 0.6 cm (sagittal x AP x transverse), which was not previously seen.\par Composition: Solid with a cystic rim\par For solid nodules or for the solid portion of a partially cystic nodule, does the solid portion have any of the following suspicious features?\par -  NO: Hypoechoic\par -  Yes: Microcalcifications\par -  No: Irregular margin (infiltrative or microlobulated)\par -  No: Taller than wide (AP dimension > transverse)\par -  No: Extrathyroidal extension\par -  No: Interrupted rim calcification with soft tissue extrusion\par \par LEFT LOBE:\par Dimensions: 5.3 x 1.4 x 1.8 cm (sagittal x AP x transverse)\par Echotexture: homogeneous\par Vascularity: normovascular\par The left lobe contains one visible nodule.\par \par Nodule 1:\par Location: Lower pole \par Dimensions: 1.6 x 1.3 x 1.6 cm (sagittal x AP x transverse), which previously measured 2.0 x 1.3 x 1.5 cm.\par Composition: Spongiform\par For solid nodules or for the solid portion of a partially cystic nodule, does the solid portion have any of the following suspicious features?\par -  No: Hypoechoic\par -  No: Microcalcifications\par -  No: Irregular margin (infiltrative or microlobulated)\par -  No: Taller than wide (AP dimension > transverse)\par -  No: Extrathyroidal extension\par -  No: Interrupted rim calcification with soft tissue extrusion\par \par ISTHMUS:\par Dimensions: 0.3 cm AP\par The isthmus lobe contains no visible nodules.\par \par CERVICAL LYMPH NODE EVALUATION (for any abnormal lymph node, please note the following: location, size, calcification, cystic area, absence of central hilum, round shape, abnormal blood flow): \par -  Bilateral prominent lymph nodes all of which demonstrate benign galileo architecture.\par \par PARATHYROID EXAMINATION:\par -  Parathyroid glands not visualized.\par \par IMPRESSION:\par Multinodular thyroid gland as above. There is a hypoechoic solid 1.1 cm nodule noted with microcalcification within the lower pole of the right thyroid lobe which meets criteria for ultrasound-guided FNA. 3

## 2021-01-08 ENCOUNTER — APPOINTMENT (OUTPATIENT)
Dept: GYNECOLOGIC ONCOLOGY | Facility: CLINIC | Age: 41
End: 2021-01-08
Payer: COMMERCIAL

## 2021-01-08 VITALS
SYSTOLIC BLOOD PRESSURE: 133 MMHG | HEIGHT: 62 IN | DIASTOLIC BLOOD PRESSURE: 88 MMHG | WEIGHT: 293 LBS | BODY MASS INDEX: 53.92 KG/M2 | OXYGEN SATURATION: 95 % | HEART RATE: 107 BPM | TEMPERATURE: 97.2 F | RESPIRATION RATE: 17 BRPM

## 2021-01-08 VITALS
OXYGEN SATURATION: 98 % | DIASTOLIC BLOOD PRESSURE: 86 MMHG | TEMPERATURE: 95.8 F | BODY MASS INDEX: 25.76 KG/M2 | HEIGHT: 62 IN | WEIGHT: 140 LBS | SYSTOLIC BLOOD PRESSURE: 125 MMHG | HEART RATE: 95 BPM

## 2021-01-08 PROCEDURE — 99215 OFFICE O/P EST HI 40 MIN: CPT | Mod: 25

## 2021-01-08 PROCEDURE — 99072 ADDL SUPL MATRL&STAF TM PHE: CPT

## 2021-01-08 PROCEDURE — 58100 BIOPSY OF UTERUS LINING: CPT

## 2021-01-08 NOTE — PROCEDURE
[Endometrial Biopsy] : an endometrial biopsy [Other: ___] : [unfilled] [Patient] : the patient [None] : none [Betadine] : betadine [Yes] : the specimen was sent to pathology [No Complications] : none [Tolerated Well] : the patient tolerated the procedure well [Post procedure instructions and information given] : post procedure instructions and information given and reviewed with patient. [FreeTextEntry1] : UCG negative. Pipelle introduced to 9 cm. Two passes. Adequate tissue.

## 2021-01-08 NOTE — REASON FOR VISIT
[FreeTextEntry1] : Patient presents for 3 month surveillance visit and EMBx. After her last visit, I had a long discussion with Dr. Pompa about fertility sparing options and the patient was encouraged to see Dr. Pompa for possible egg retrieval. She is waiting for her insurance, which covers [facility] to become active.\par \par The role that estrogen and obesity in the pathogenesis of CAH was discussed. She is currently on the maximum doses of progesterone. Importance of weight loss emphasized. Patient reports that she has gained weight since her last visit and is having trouble controlling her food intake and does not have time to exercise. \par \par At the last visit, patient shared that she would once again like to pursue a consult with a bariatric surgeon. She has not seen the bariatric surgeon since her last visit, but said she is trying to find time in her vacation schedule to see the surgeon. \par \par I explained that with CAH that it is resistant treatment, a hysterectomy may ultimately be necessary.\par \par She shared with me that she has noticed occasional joint pain in the past few months. She is unsure if this is a side effect of progesterone or something unrelated. I encouraged her to see her PCP to r/o rheumatologic causes of joint pain. Similarly we discussed that joint pain can be 2' to weight gain.

## 2021-01-08 NOTE — ASSESSMENT
[FreeTextEntry1] : Once again we discussed the recommendation for hysterectomy if she fails fertility sparing management.\par \par Premenopausal women who wish to preserve fertility or women of any menopausal status who are not surgical candidates may be treated with progestin therapy.\par \par Successful pregnancy does occur after resolution of atypical hyperplasia. Treatment with other agents or conservative surgery is not standard clinical practice, but has been described.\par \par Importance of compliance with medical therapy and follow-up endometrial sampling is very important. \par Progestin therapy is typically the oral progestin used for atypical hyperplasia because it is more potent than Progestin therapy. Oral megestrol acetate 80 mg twice per day. This may be increased to 160 mg twice per day if there is no regression of the hyperplasia on follow-up endometrial sampling. Alternatively, a 20 mcg/day Mirena IUD-releasing intrauterine device ([id number]; Mirena) may be utilized alone or can be used in conjunction with oral progestin, but patient does not want to do this at this time.\par \par Endometrial sampling should be repeated three months after the initiation of progestin therapy. If persistent disease is noted, the progestin dose may be increased. Sampling should be repeated again after three months. The median time for regression on progestin therapy from six to nine months. \par \par Will follow up EMBx and consider pursuing clinical trial for her if she continues to display CAH on pathology.\par \par [] EMBx\par [] Bariatric surgery\par [] [facility]\par [] Follow up in 3 months\par

## 2021-01-08 NOTE — PHYSICAL EXAM
[Normal] : Recto-Vaginal Exam: Normal [de-identified] : morbid obesity, nontender [de-identified] : IUD strings not visualized. Nontender [de-identified] : could not appreciate 2' body habitus [de-identified] : cannot appreciate 2' to body habitus

## 2021-01-29 ENCOUNTER — RX RENEWAL (OUTPATIENT)
Age: 41
End: 2021-01-29

## 2021-04-30 ENCOUNTER — NON-APPOINTMENT (OUTPATIENT)
Age: 41
End: 2021-04-30

## 2021-04-30 ENCOUNTER — APPOINTMENT (OUTPATIENT)
Dept: GYNECOLOGIC ONCOLOGY | Facility: CLINIC | Age: 41
End: 2021-04-30
Payer: COMMERCIAL

## 2021-04-30 VITALS
OXYGEN SATURATION: 95 % | DIASTOLIC BLOOD PRESSURE: 83 MMHG | WEIGHT: 293 LBS | TEMPERATURE: 97.1 F | SYSTOLIC BLOOD PRESSURE: 137 MMHG | HEART RATE: 108 BPM | HEIGHT: 62 IN | BODY MASS INDEX: 53.92 KG/M2

## 2021-04-30 PROCEDURE — 99215 OFFICE O/P EST HI 40 MIN: CPT

## 2021-04-30 PROCEDURE — 99072 ADDL SUPL MATRL&STAF TM PHE: CPT

## 2021-04-30 RX ORDER — MEGESTROL ACETATE 40 MG/1
40 TABLET ORAL
Qty: 240 | Refills: 2 | Status: COMPLETED | COMMUNITY
Start: 2020-02-26 | End: 2021-04-30

## 2021-04-30 NOTE — ASSESSMENT
[FreeTextEntry1] : Most recent pathology reassuring for response to treatment. \par \par Patient with significant weight gain the past year. We discussed this at length today. This has been an ongoing concern at each visit. I have encouraged her to see bariatrics and nutrition. She has done neither. Without appropriate management of her weight, I am afraid her CAH will recur and possibly progress to cancer.\par \par Given recent normal pathology, we will decrease her megace dose to 80mg BID to try to mitigate the side effects.\par \par Follow up in 3 months for biopsy.\par \par

## 2021-04-30 NOTE — HISTORY OF PRESENT ILLNESS
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 endometrium appears markedly thickened\par 4) EMB 8/28/2019\par  a) Rare foci of complex atypical hyperplasia in a background of exogenous hormone-related change.\par 5) EMB 11/22/2019 \par  a) Endometrium with foci of complex atypical hyperplasia in a background of exogenous hormone related changes. \par 6) EMB 2/27/2020\par  a) CAH\par 7) Pelvic US 7/7/20\par  a) uterus 8.4cm\par  b) endometrium 0.8cm , prominent echogenicity along its superior extent\par  c) IUD low in position \par 8) D&C hysteroscopy 7/27/20, removal of IUD and replacement of Mirena IUD\par  a) Uterus, intrauterine device; removal: -Intrauterine device-IUD (gross examination only).\par  b) Endometrium, curettings:\par - Endometrium with foci of complex atypical hyperplasia\par - Foci consistent with portions of benign endometrial polyp\par - Extensive areas consistent with progestin therapy effect\par - Acute and chronic endometritis - see note \par 8) S/P EMB 1/8/2021\par   a) Endometrial curettings/ biopsy:\par  - Inactive endometrium with pseudodecidualized stroma, compatible with exogenous hormonal effect. No\par    hyperplasia or atypia is seen\par \par Pt is currently with Mirena IUD and taking Megace 160mg BID\par \par

## 2021-04-30 NOTE — PHYSICAL EXAM
[Normal] : Recto-Vaginal Exam: Normal [de-identified] : morbid obesity, nontender [de-identified] : IUD strings visualized. Nontender [de-identified] : could not appreciate 2' body habitus [de-identified] : cannot appreciate 2' to body habitus

## 2021-05-14 ENCOUNTER — APPOINTMENT (OUTPATIENT)
Dept: GYNECOLOGIC ONCOLOGY | Facility: CLINIC | Age: 41
End: 2021-05-14
Payer: COMMERCIAL

## 2021-05-20 ENCOUNTER — APPOINTMENT (OUTPATIENT)
Dept: BARIATRICS | Facility: CLINIC | Age: 41
End: 2021-05-20
Payer: COMMERCIAL

## 2021-05-20 VITALS — BODY MASS INDEX: 53.92 KG/M2 | WEIGHT: 293 LBS | HEIGHT: 62 IN

## 2021-05-20 DIAGNOSIS — K76.0 FATTY (CHANGE OF) LIVER, NOT ELSEWHERE CLASSIFIED: ICD-10-CM

## 2021-05-20 PROCEDURE — 99214 OFFICE O/P EST MOD 30 MIN: CPT | Mod: 95

## 2021-06-04 NOTE — REVIEW OF SYSTEMS
[Patient Intake Form Reviewed] : Patient intake form was reviewed [Shortness Of Breath] : no shortness of breath [Wheezing] : wheezing [Cough] : no cough [SOB on Exertion] : no shortness of breath during exertion [Negative] : Psychiatric

## 2021-06-04 NOTE — HISTORY OF PRESENT ILLNESS
[Home] : at home, [unfilled] , at the time of the visit. [Other Location: e.g. Home (Enter Location, City,State)___] : at [unfilled] [Verbal consent obtained from patient] : the patient, [unfilled] [de-identified] : Patient is a 41 year old female with along history of morbid obesity not responsive to multiple dietary regimens. She has a BMI of 56.7 ans weight-related comorbidities including hypertension, type 2 diabetes mellitus, asthma, fatty liver and PCOS.

## 2021-06-04 NOTE — ASSESSMENT
[FreeTextEntry1] : Patient meets the NIH criteria for bariatric surgery and would like to have a laparoscopic sleeve gastrectomy. I have reviewed the risks and benefits of the procedure with the patient, and she understands this information fully. She will need to lose at least 15 pounds prior to surgery.

## 2021-06-23 NOTE — PHYSICAL EXAM
Problem: PHYSICAL THERAPY ADULT  Goal: Performs mobility at highest level of function for planned discharge setting  See evaluation for individualized goals  Description: Treatment/Interventions: Functional transfer training, LE strengthening/ROM, Elevations, Therapeutic exercise, Endurance training, Equipment eval/education, Bed mobility, Gait training, Spoke to nursing, Spoke to case management, OT  Equipment Recommended: Art Núñez       See flowsheet documentation for full assessment, interventions and recommendations  6/23/2021 1535 by Raul Flores, PT  Note: Prognosis: Guarded  Problem List: Decreased strength, Decreased endurance, Impaired balance, Decreased mobility, Decreased cognition, Decreased safety awareness  Assessment: Pt is a 80 y o  male seen for PT evaluation s/p admit to [facility] on 6/22/2021  Pt was admitted with a primary dx of: severe aortic stenosis  Pt is POD #1 s/p TAVR  PT now consulted for assessment of mobility and d/c needs  Pt with Up with assistance, Ambulate, PT evaluation orders  Pts current comorbidities effecting treatment include: BPH, carotid stenosis, depression, DM, GERD, hx of TIA, HTN, pulmonary fibrosis, splenomegaly  Pts current clinical presentation is Unstable/ Unpredictable (high complexity) due to Ongoing medical management for primary dx, Increased reliance on more restrictive AD compared to baseline, Decreased activity tolerance compared to baseline, Fall risk, Increased assistance needed from caregiver at current time, Ongoing telemetry monitoring, Cog status, s/p surgical intervention  Pt questionable historian 2* cog, but reports being I with ADLs and ambulating with SPC at baseline  Pt lives alone in a trailer with 3 GAURANG  Upon evaluation, pt currently is requiring modA for bed mobility; Chantelle for transfers and Chantelle for ambulation 140 ft w/ RW   Pt presents at PT eval functioning below baseline and currently w/ overall mobility deficits 2* to: BLE weakness, impaired balance, decreased endurance, gait deviations, pain, decreased activity tolerance compared to baseline, decreased functional mobility tolerance compared to baseline, fall risk, decreased cognition  Pt currently at a fall risk 2* to impairments listed above  Pt will continue to benefit from skilled acute PT interventions to address stated impairments; to maximize functional mobility; for ongoing pt/ family training; and DME needs  At conclusion of PT session pt returned back in chair and chair alarm engaged with phone and call bell within reach  Pt denies any further questions at this time  The patient's AM-PAC Basic Mobility Inpatient Short Form Raw Score is 17, Standardized Score is 39 67  A standardized score less than 42 9 suggests the patient may benefit from discharge to post-acute rehabilitation services  Please also refer to the recommendation of the Physical Therapist for safe discharge planning  Recommend rehab pending progress upon hospital D/C  Barriers to Discharge: Decreased caregiver support        PT Discharge Recommendation: Post acute rehabilitation services (pending progress)          See flowsheet documentation for full assessment  [Normal] : Recto-Vaginal Exam: Normal [de-identified] : morbid obesity, nontender [de-identified] : cannot appreciate 2' to body habitus [de-identified] : could not appreciate 2' body habitus

## 2021-07-19 ENCOUNTER — RX RENEWAL (OUTPATIENT)
Age: 41
End: 2021-07-19

## 2021-07-29 ENCOUNTER — RX RENEWAL (OUTPATIENT)
Age: 41
End: 2021-07-29

## 2021-07-30 ENCOUNTER — APPOINTMENT (OUTPATIENT)
Dept: GYNECOLOGIC ONCOLOGY | Facility: CLINIC | Age: 41
End: 2021-07-30
Payer: COMMERCIAL

## 2021-07-30 VITALS
BODY MASS INDEX: 53.92 KG/M2 | TEMPERATURE: 96.6 F | OXYGEN SATURATION: 96 % | HEIGHT: 62 IN | SYSTOLIC BLOOD PRESSURE: 126 MMHG | WEIGHT: 293 LBS | DIASTOLIC BLOOD PRESSURE: 85 MMHG | HEART RATE: 107 BPM

## 2021-07-30 PROCEDURE — 99215 OFFICE O/P EST HI 40 MIN: CPT | Mod: 25

## 2021-07-30 PROCEDURE — 58100 BIOPSY OF UTERUS LINING: CPT

## 2021-07-30 NOTE — REASON FOR VISIT
[FreeTextEntry1] : Patient presents for 6 month surveillance. She met with Dr. Mata regarding bariatric surgery. She has not yet scheduled her follow up with him or with Dr. Pompa, but it is on her to-do list. \par \par She has no acute complaints today.

## 2021-07-30 NOTE — PHYSICAL EXAM
[Normal] : Recto-Vaginal Exam: Normal [de-identified] : morbid obesity, nontender [de-identified] : IUD strings visualized. Nontender [de-identified] : could not appreciate 2' body habitus [de-identified] : cannot appreciate 2' to body habitus

## 2021-07-30 NOTE — ASSESSMENT
[FreeTextEntry1] : Patient tolerated EMB\par \par Importance of weight loss emphasized. We also discussed long term management of her endometrial hyperplasia. I am hesitant to make any adjustments to her current regimen as she had persistent hyperplasia after 18 months of treatment and finally had a response on her most recent biopsy.\par \par If today's biopsy is normal, follow up in 3 months.\par \par

## 2021-08-03 ENCOUNTER — NON-APPOINTMENT (OUTPATIENT)
Age: 41
End: 2021-08-03

## 2021-10-29 ENCOUNTER — APPOINTMENT (OUTPATIENT)
Dept: HUMAN REPRODUCTION | Facility: CLINIC | Age: 41
End: 2021-10-29
Payer: COMMERCIAL

## 2021-10-29 ENCOUNTER — RESULT REVIEW (OUTPATIENT)
Age: 41
End: 2021-10-29

## 2021-10-29 ENCOUNTER — APPOINTMENT (OUTPATIENT)
Dept: GYNECOLOGIC ONCOLOGY | Facility: CLINIC | Age: 41
End: 2021-10-29
Payer: COMMERCIAL

## 2021-10-29 ENCOUNTER — OUTPATIENT (OUTPATIENT)
Dept: OUTPATIENT SERVICES | Facility: HOSPITAL | Age: 41
LOS: 1 days | End: 2021-10-29

## 2021-10-29 ENCOUNTER — APPOINTMENT (OUTPATIENT)
Dept: MAMMOGRAPHY | Facility: CLINIC | Age: 41
End: 2021-10-29
Payer: COMMERCIAL

## 2021-10-29 VITALS
DIASTOLIC BLOOD PRESSURE: 75 MMHG | SYSTOLIC BLOOD PRESSURE: 125 MMHG | WEIGHT: 293 LBS | TEMPERATURE: 95 F | OXYGEN SATURATION: 96 % | HEART RATE: 116 BPM | HEIGHT: 62 IN | BODY MASS INDEX: 53.92 KG/M2

## 2021-10-29 DIAGNOSIS — Z98.890 OTHER SPECIFIED POSTPROCEDURAL STATES: Chronic | ICD-10-CM

## 2021-10-29 DIAGNOSIS — K08.409 PARTIAL LOSS OF TEETH, UNSPECIFIED CAUSE, UNSPECIFIED CLASS: Chronic | ICD-10-CM

## 2021-10-29 DIAGNOSIS — Z41.9 ENCOUNTER FOR PROCEDURE FOR PURPOSES OTHER THAN REMEDYING HEALTH STATE, UNSPECIFIED: Chronic | ICD-10-CM

## 2021-10-29 PROCEDURE — 99215 OFFICE O/P EST HI 40 MIN: CPT | Mod: 25

## 2021-10-29 PROCEDURE — 58100 BIOPSY OF UTERUS LINING: CPT

## 2021-10-29 PROCEDURE — 77063 BREAST TOMOSYNTHESIS BI: CPT | Mod: 26

## 2021-10-29 PROCEDURE — 36415 COLL VENOUS BLD VENIPUNCTURE: CPT

## 2021-10-29 PROCEDURE — 77067 SCR MAMMO BI INCL CAD: CPT | Mod: 26

## 2021-10-29 RX ORDER — MISOPROSTOL 200 UG/1
200 TABLET ORAL
Qty: 10 | Refills: 0 | Status: COMPLETED | COMMUNITY
Start: 2020-07-17 | End: 2021-10-29

## 2021-11-01 NOTE — HISTORY OF PRESENT ILLNESS
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 endometrium appears markedly thickened\par 4) EMB 8/28/2019\par  a) Rare foci of complex atypical hyperplasia in a background of exogenous hormone-related change.\par 5) EMB 11/22/2019 \par  a) Endometrium with foci of complex atypical hyperplasia in a background of exogenous hormone related changes. \par 6) EMB 2/27/2020\par  a) CAH\par 7) Pelvic US 7/7/20\par  a) uterus 8.4cm\par  b) endometrium 0.8cm , prominent echogenicity along its superior extent\par  c) IUD low in position \par 8) D&C hysteroscopy 7/27/20, removal of IUD and replacement of Mirena IUD\par  a) Uterus, intrauterine device; removal: -Intrauterine device-IUD (gross examination only).\par  b) Endometrium, curettings:\par - Endometrium with foci of complex atypical hyperplasia\par - Foci consistent with portions of benign endometrial polyp\par - Extensive areas consistent with progestin therapy effect\par - Acute and chronic endometritis - see note \par 8) S/P EMB 1/8/2021\par   a) Endometrial curettings/ biopsy:\par  - Inactive endometrium with pseudodecidualized stroma, compatible with exogenous hormonal effect. No\par    hyperplasia or atypia is seen\par 9) S/P EMB 7/20/21\par    a) - Superficial fragments of endometrium with marked stromal decidualization suggestive of exogenous hormonal administration\par       -  No endometrial hyperplasia or atypia seen\par \par \par

## 2021-11-01 NOTE — PHYSICAL EXAM
[Normal] : Recto-Vaginal Exam: Normal [de-identified] : morbid obesity, nontender [de-identified] : IUD strings visualized. Nontender [de-identified] : could not appreciate 2' body habitus [de-identified] : cannot appreciate 2' to body habitus

## 2021-11-01 NOTE — REASON FOR VISIT
[FreeTextEntry1] : Pt presents for 3 month follow up. Pt is currently with Mirena IUD and taking Megace 80mg BID. Pt states she has noticed abdominal pain described as "menstrual cramps" when she lays on her stomach. Otherwise, patient has not complaints. \par \par UCG today Neg \par \par Mammogram- 10/29/21- circumscribed mass in each breast. Patient will be called back for bilateral breast ultrasound.

## 2021-11-01 NOTE — ASSESSMENT
[FreeTextEntry1] : Patient once again counseled regarding fertility sparing management of CAH. We discussed decreasing megace to 80m BID if today's biopsy continues to show complete response.\par \par She is scheduled to see Dr. Pompa again for egg retrieval\par \par Bilateral breast ultrasound requested by radiology. Referral given.\par \par She has decided to wait until next year to consider bariatric surgery.\par \par [] BL breast US\par [] Decrease megace to 80mg BID if complete response\par [] Follow up in 3 months.

## 2021-11-03 ENCOUNTER — NON-APPOINTMENT (OUTPATIENT)
Age: 41
End: 2021-11-03

## 2021-11-03 LAB — CORE LAB BIOPSY: NORMAL

## 2021-11-05 ENCOUNTER — NON-APPOINTMENT (OUTPATIENT)
Age: 41
End: 2021-11-05

## 2021-11-10 ENCOUNTER — APPOINTMENT (OUTPATIENT)
Dept: HUMAN REPRODUCTION | Facility: CLINIC | Age: 41
End: 2021-11-10
Payer: COMMERCIAL

## 2021-11-10 PROCEDURE — 99499PP: CUSTOM

## 2021-11-23 ENCOUNTER — APPOINTMENT (OUTPATIENT)
Dept: INTERNAL MEDICINE | Facility: CLINIC | Age: 41
End: 2021-11-23
Payer: COMMERCIAL

## 2021-11-23 PROCEDURE — 99213 OFFICE O/P EST LOW 20 MIN: CPT | Mod: 95

## 2021-11-23 NOTE — PHYSICAL EXAM
[No Acute Distress] : no acute distress [Normal Sclera/Conjunctiva] : normal sclera/conjunctiva [Normal Outer Ear/Nose] : the outer ears and nose were normal in appearance [No Respiratory Distress] : no respiratory distress  [No Accessory Muscle Use] : no accessory muscle use [Normal Affect] : the affect was normal [Alert and Oriented x3] : oriented to person, place, and time [de-identified] : obese

## 2021-11-23 NOTE — HISTORY OF PRESENT ILLNESS
[Home] : at home, [unfilled] , at the time of the visit. [Medical Office: ([facility])___] : at the medical office located in  [Verbal consent obtained from patient] : the patient, [unfilled] [de-identified] : 42 y/o female presents for medication management.\par h/o obesity for which she had visit with bariatric surgeon but had to put on hold due to work/school.\par H/o asthma. Hasn't used inhaler in 3 years but it  and she is travelling this week and is nervous to go away with it.\par Needs refill on Metformin. Understands she hasn't seen ENDO or me in >1 year and needs to but I will give her 1 refill.\par Agrees to f/u ASAP.\par No complaints.\par No change to health.\par \par

## 2021-11-23 NOTE — ASSESSMENT
[FreeTextEntry1] : Meds refilled. Pt needs appointment ASAP. She understands this.\par Advised f/u with batriatric surgery.\par

## 2021-11-26 ENCOUNTER — APPOINTMENT (OUTPATIENT)
Dept: ULTRASOUND IMAGING | Facility: CLINIC | Age: 41
End: 2021-11-26
Payer: COMMERCIAL

## 2021-11-26 ENCOUNTER — RESULT REVIEW (OUTPATIENT)
Age: 41
End: 2021-11-26

## 2021-11-26 ENCOUNTER — OUTPATIENT (OUTPATIENT)
Dept: OUTPATIENT SERVICES | Facility: HOSPITAL | Age: 41
LOS: 1 days | End: 2021-11-26

## 2021-11-26 DIAGNOSIS — Z41.9 ENCOUNTER FOR PROCEDURE FOR PURPOSES OTHER THAN REMEDYING HEALTH STATE, UNSPECIFIED: Chronic | ICD-10-CM

## 2021-11-26 DIAGNOSIS — Z98.890 OTHER SPECIFIED POSTPROCEDURAL STATES: Chronic | ICD-10-CM

## 2021-11-26 DIAGNOSIS — K08.409 PARTIAL LOSS OF TEETH, UNSPECIFIED CAUSE, UNSPECIFIED CLASS: Chronic | ICD-10-CM

## 2021-11-26 PROCEDURE — 76641 ULTRASOUND BREAST COMPLETE: CPT | Mod: 26,50

## 2021-11-29 ENCOUNTER — NON-APPOINTMENT (OUTPATIENT)
Age: 41
End: 2021-11-29

## 2021-11-30 ENCOUNTER — NON-APPOINTMENT (OUTPATIENT)
Age: 41
End: 2021-11-30

## 2021-12-20 ENCOUNTER — NON-APPOINTMENT (OUTPATIENT)
Age: 41
End: 2021-12-20

## 2022-01-14 ENCOUNTER — APPOINTMENT (OUTPATIENT)
Dept: GYNECOLOGIC ONCOLOGY | Facility: CLINIC | Age: 42
End: 2022-01-14
Payer: COMMERCIAL

## 2022-02-11 ENCOUNTER — APPOINTMENT (OUTPATIENT)
Dept: GYNECOLOGIC ONCOLOGY | Facility: CLINIC | Age: 42
End: 2022-02-11
Payer: COMMERCIAL

## 2022-02-11 VITALS
WEIGHT: 293 LBS | DIASTOLIC BLOOD PRESSURE: 91 MMHG | HEART RATE: 115 BPM | SYSTOLIC BLOOD PRESSURE: 128 MMHG | BODY MASS INDEX: 53.92 KG/M2 | HEIGHT: 62 IN | OXYGEN SATURATION: 95 % | TEMPERATURE: 95.5 F

## 2022-02-11 PROCEDURE — 58100 BIOPSY OF UTERUS LINING: CPT

## 2022-02-11 PROCEDURE — 99214 OFFICE O/P EST MOD 30 MIN: CPT | Mod: 25

## 2022-02-11 NOTE — HISTORY OF PRESENT ILLNESS
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 endometrium appears markedly thickened\par 4) EMB 8/28/2019\par  a) Rare foci of complex atypical hyperplasia in a background of exogenous hormone-related change.\par 5) EMB 11/22/2019 \par  a) Endometrium with foci of complex atypical hyperplasia in a background of exogenous hormone related changes. \par 6) EMB 2/27/2020\par  a) CAH\par 7) Pelvic US 7/7/20\par  a) uterus 8.4cm\par  b) endometrium 0.8cm , prominent echogenicity along its superior extent\par  c) IUD low in position \par 8) D&C hysteroscopy 7/27/20, removal of IUD and replacement of Mirena IUD\par  a) Uterus, intrauterine device; removal: -Intrauterine device-IUD (gross examination only).\par  b) Endometrium, curettings:\par - Endometrium with foci of complex atypical hyperplasia\par - Foci consistent with portions of benign endometrial polyp\par - Extensive areas consistent with progestin therapy effect\par - Acute and chronic endometritis - see note \par 8) S/P EMB 1/8/2021\par   a) Endometrial curettings/ biopsy:\par  - Inactive endometrium with pseudodecidualized stroma, compatible with exogenous hormonal effect. No\par    hyperplasia or atypia is seen\par 9) S/P EMB 7/20/21\par    a) - Superficial fragments of endometrium with marked stromal decidualization suggestive of exogenous hormonal administration\par       -  No endometrial hyperplasia or atypia seen\par 10) 10/29/21- EMB-- Fragments of inactive endometrium with marked pseudodecidualization and inflammation.\par   - Benign endocervical tissue.\par   - Fibrin and inflammatory exudate.\par   - Negative for hyperplasia.\par \par \par   immune

## 2022-02-11 NOTE — ASSESSMENT
[FreeTextEntry1] : Patient once again counseled regarding fertility sparing management of CAH. \par \par We discussed decreasing megace to 40mg BID in 3 months if she continues to demonstrate a prolonged response.\par \par Information for [facility] given for second opinion. \par \par [] EMBx\par [] Follow up in 3 months. \par [] Needs repeat breast ultrasound in May 2022- order at next visit\par

## 2022-02-11 NOTE — PROCEDURE
[Endometrial Biopsy] : an endometrial biopsy [Other: ___] : [unfilled] [Patient] : the patient [Betadine] : betadine [Yes] : the specimen was sent to pathology [No Complications] : none [Tolerated Well] : the patient tolerated the procedure well [Post procedure instructions and information given] : post procedure instructions and information given and reviewed with patient. [FreeTextEntry1] : UCG negative. Pipelle introduced to 8 cm, 1 pass, adequate tissue.

## 2022-02-11 NOTE — PHYSICAL EXAM
[Normal] : Recto-Vaginal Exam: Normal [de-identified] : morbid obesity, nontender [de-identified] : IUD strings visualized. Nontender [de-identified] : could not appreciate 2' body habitus [de-identified] : cannot appreciate 2' to body habitus

## 2022-02-11 NOTE — REASON FOR VISIT
[FreeTextEntry1] : Pt presents for 3 month follow up. Pt is currently with Mirena IUD and taking Megace 40 BID\par \par She has no acute complaints today and reports that she feels much better after decreasing her megace dose. She has lost 15lbs and has noticed an improvement in her generalized muscle weakness. \par \par She shared with me that she was interested in having egg retrieval, but that she was told that she is too obese and that her ovaries are not visible. She is requesting a referral for a second opinion. \par \par Mammogram- 10/29/21- circumscribed mass in each breast. Patient will be called back for bilateral breast ultrasound. \par BREAST US: Probably benign masses in both breasts. In the absence of the prior ultrasound for comparison, recommend follow-up bilateral breast ultrasound in 6 months (May 2022).\par

## 2022-02-18 LAB — CORE LAB BIOPSY: NORMAL

## 2022-03-08 RX ORDER — MEGESTROL ACETATE 40 MG/1
40 TABLET ORAL
Qty: 336 | Refills: 3 | Status: COMPLETED | COMMUNITY
Start: 2021-04-30 | End: 2022-03-08

## 2022-06-02 ENCOUNTER — RESULT CHARGE (OUTPATIENT)
Age: 42
End: 2022-06-02

## 2022-06-03 ENCOUNTER — APPOINTMENT (OUTPATIENT)
Dept: GYNECOLOGIC ONCOLOGY | Facility: CLINIC | Age: 42
End: 2022-06-03
Payer: COMMERCIAL

## 2022-06-03 VITALS
RESPIRATION RATE: 18 BRPM | SYSTOLIC BLOOD PRESSURE: 127 MMHG | BODY MASS INDEX: 52.81 KG/M2 | OXYGEN SATURATION: 95 % | HEIGHT: 62 IN | DIASTOLIC BLOOD PRESSURE: 81 MMHG | WEIGHT: 287 LBS | TEMPERATURE: 97.1 F | HEART RATE: 99 BPM

## 2022-06-03 PROCEDURE — 99214 OFFICE O/P EST MOD 30 MIN: CPT | Mod: 25

## 2022-06-03 PROCEDURE — 58100 BIOPSY OF UTERUS LINING: CPT

## 2022-06-03 NOTE — PROCEDURE
[Endometrial Biopsy] : an endometrial biopsy [Other: ___] : [unfilled] [Patient] : the patient [None] : none [Betadine] : betadine [Yes] : the specimen was sent to pathology [No Complications] : none [Tolerated Well] : the patient tolerated the procedure well [Post procedure instructions and information given] : post procedure instructions and information given and reviewed with patient. [FreeTextEntry1] : uCG negative. Pipelle introduced to 8 cm, adequate tissue.

## 2022-06-03 NOTE — PHYSICAL EXAM
[Normal] : Recto-Vaginal Exam: Normal [de-identified] : morbid obesity, nontender [de-identified] : IUD strings visualized. Nontender [de-identified] : could not appreciate 2' body habitus [de-identified] : cannot appreciate 2' to body habitus

## 2022-06-03 NOTE — HISTORY OF PRESENT ILLNESS
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 endometrium appears markedly thickened\par 4) EMB 8/28/2019\par  a) Rare foci of complex atypical hyperplasia in a background of exogenous hormone-related change.\par 5) EMB 11/22/2019 \par  a) Endometrium with foci of complex atypical hyperplasia in a background of exogenous hormone related changes. \par 6) EMB 2/27/2020\par  a) CAH\par 7) Pelvic US 7/7/20\par  a) uterus 8.4cm\par  b) endometrium 0.8cm , prominent echogenicity along its superior extent\par  c) IUD low in position \par 8) D&C hysteroscopy 7/27/20, removal of IUD and replacement of Mirena IUD\par  a) Uterus, intrauterine device; removal: -Intrauterine device-IUD (gross examination only).\par  b) Endometrium, curettings:\par - Endometrium with foci of complex atypical hyperplasia\par - Foci consistent with portions of benign endometrial polyp\par - Extensive areas consistent with progestin therapy effect\par - Acute and chronic endometritis - see note \par 8) S/P EMB 1/8/2021\par   a) Endometrial curettings/ biopsy:\par  - Inactive endometrium with pseudodecidualized stroma, compatible with exogenous hormonal effect. No\par    hyperplasia or atypia is seen\par 9) S/P EMB 7/20/21\par    a) - Superficial fragments of endometrium with marked stromal decidualization suggestive of exogenous hormonal administration\par       -  No endometrial hyperplasia or atypia seen\par 10) 10/29/21- EMB-- Fragments of inactive endometrium with marked pseudodecidualization and inflammation.\par   - Benign endocervical tissue.\par   - Fibrin and inflammatory exudate.\par   - Negative for hyperplasia.\par 11) EMB 2/2022 - Endometrium with marked stromal pseudodecidualization consistent\par  with exogenous hormone-related change\par \par \par

## 2022-06-03 NOTE — ASSESSMENT
[FreeTextEntry1] : Patient once again counseled regarding management of CAH. I once again explained that hysterectomy is recommended when childbearing is complete.\par \par Patient encouraged to follow up with bariatric surgery and [facility]\par \par Breast ultrasound referral given\par \par If endometrial biopsy again without evidence of hyperplasia, will stop megace and plan for repeat EMB in 6 months.\par \par [] EMB\par [] Follow up in 6 months\par
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
## 2022-06-07 ENCOUNTER — NON-APPOINTMENT (OUTPATIENT)
Age: 42
End: 2022-06-07

## 2022-06-07 LAB — CORE LAB BIOPSY: NORMAL

## 2022-06-15 ENCOUNTER — APPOINTMENT (OUTPATIENT)
Dept: ENDOCRINOLOGY | Facility: CLINIC | Age: 42
End: 2022-06-15
Payer: COMMERCIAL

## 2022-06-15 PROCEDURE — 99214 OFFICE O/P EST MOD 30 MIN: CPT | Mod: 95

## 2022-06-15 RX ORDER — MEGESTROL ACETATE 40 MG/1
40 TABLET ORAL
Qty: 60 | Refills: 3 | Status: DISCONTINUED | COMMUNITY
Start: 2022-03-08 | End: 2022-06-15

## 2022-06-15 RX ORDER — FLUTICASONE PROPIONATE 50 MCG
50 SPRAY, SUSPENSION NASAL
Refills: 0 | Status: DISCONTINUED | COMMUNITY
End: 2022-06-15

## 2022-07-21 ENCOUNTER — NON-APPOINTMENT (OUTPATIENT)
Age: 42
End: 2022-07-21

## 2022-07-21 ENCOUNTER — APPOINTMENT (OUTPATIENT)
Dept: INTERNAL MEDICINE | Facility: CLINIC | Age: 42
End: 2022-07-21

## 2022-07-21 VITALS
TEMPERATURE: 97.7 F | BODY MASS INDEX: 52.44 KG/M2 | SYSTOLIC BLOOD PRESSURE: 138 MMHG | HEIGHT: 62 IN | HEART RATE: 98 BPM | WEIGHT: 285 LBS | OXYGEN SATURATION: 96 % | DIASTOLIC BLOOD PRESSURE: 98 MMHG

## 2022-07-21 DIAGNOSIS — M26.629 ARTHRALGIA OF TEMPOROMANDIBULAR JOINT,: ICD-10-CM

## 2022-07-21 DIAGNOSIS — Z97.5 PRESENCE OF (INTRAUTERINE) CONTRACEPTIVE DEVICE: ICD-10-CM

## 2022-07-21 PROCEDURE — 99396 PREV VISIT EST AGE 40-64: CPT | Mod: 25

## 2022-07-21 PROCEDURE — 99213 OFFICE O/P EST LOW 20 MIN: CPT | Mod: 25

## 2022-07-21 PROCEDURE — 36415 COLL VENOUS BLD VENIPUNCTURE: CPT

## 2022-07-21 PROCEDURE — 93000 ELECTROCARDIOGRAM COMPLETE: CPT

## 2022-07-21 RX ORDER — NITROFURANTOIN (MONOHYDRATE/MACROCRYSTALS) 25; 75 MG/1; MG/1
100 CAPSULE ORAL
Qty: 20 | Refills: 0 | Status: COMPLETED | COMMUNITY
Start: 2022-03-10

## 2022-07-21 RX ORDER — LEVONORGESTREL 52 MG/1
INTRAUTERINE DEVICE INTRAUTERINE
Refills: 0 | Status: COMPLETED | COMMUNITY
End: 2022-07-21

## 2022-07-21 NOTE — PHYSICAL EXAM
[No Acute Distress] : no acute distress [Well Nourished] : well nourished [Well Developed] : well developed [Well-Appearing] : well-appearing [Normal Sclera/Conjunctiva] : normal sclera/conjunctiva [PERRL] : pupils equal round and reactive to light [EOMI] : extraocular movements intact [Normal Outer Ear/Nose] : the outer ears and nose were normal in appearance [Normal Oropharynx] : the oropharynx was normal [No JVD] : no jugular venous distention [No Lymphadenopathy] : no lymphadenopathy [Supple] : supple [Thyroid Normal, No Nodules] : the thyroid was normal and there were no nodules present [No Respiratory Distress] : no respiratory distress  [No Accessory Muscle Use] : no accessory muscle use [Clear to Auscultation] : lungs were clear to auscultation bilaterally [Normal Rate] : normal rate  [Regular Rhythm] : with a regular rhythm [Normal S1, S2] : normal S1 and S2 [No Murmur] : no murmur heard [No Carotid Bruits] : no carotid bruits [No Abdominal Bruit] : a ~M bruit was not heard ~T in the abdomen [No Varicosities] : no varicosities [Pedal Pulses Present] : the pedal pulses are present [No Edema] : there was no peripheral edema [No Palpable Aorta] : no palpable aorta [No Extremity Clubbing/Cyanosis] : no extremity clubbing/cyanosis [Soft] : abdomen soft [Non Tender] : non-tender [Non-distended] : non-distended [No HSM] : no HSM [Normal Bowel Sounds] : normal bowel sounds [Normal Posterior Cervical Nodes] : no posterior cervical lymphadenopathy [Normal Anterior Cervical Nodes] : no anterior cervical lymphadenopathy [No CVA Tenderness] : no CVA  tenderness [No Spinal Tenderness] : no spinal tenderness [No Joint Swelling] : no joint swelling [Grossly Normal Strength/Tone] : grossly normal strength/tone [No Rash] : no rash [Coordination Grossly Intact] : coordination grossly intact [No Focal Deficits] : no focal deficits [Normal Gait] : normal gait [Deep Tendon Reflexes (DTR)] : deep tendon reflexes were 2+ and symmetric [Normal Affect] : the affect was normal [Normal Insight/Judgement] : insight and judgment were intact [Normal Appearance] : normal in appearance [No Masses] : no palpable masses [No Nipple Discharge] : no nipple discharge [Alert and Oriented x3] : oriented to person, place, and time [de-identified] : obese

## 2022-07-21 NOTE — HISTORY OF PRESENT ILLNESS
[de-identified] : 43 y/o female with history of type 2 diabetes mellitus, polycystic ovarian syndrome, elevated body mass index, thyroid nodules, asthma, seasonal allergies, complex atypical endometrial hyperplasia presenting for CPE.\par She is not on a statin for cholesterol\par Pt with obesity-highest weight was 310 pounds. She was on phentermine in the past but did not tolerate due to palpitations.  She was on bupropion in the past for depression/anxiety, off for a few years.  She has tolerated metformin and Trulicity. \par She is interested in pursuing bariatric surgery.  Saw ENDO last month and had labs orderewd but waited to have them done today. Trulicity restarted. \par \par Grinds her teeth. Got bite plate from dentist but doesn't help during the day. Interested in further pursuing treatment as it can cause her pain.\par \par Had BIRADS 3 MAMMO/SONO and is due for f/u (ordered and has appointment). \par \par Nephrology screening: Due\par Last ophthalmology appointment: February 2022; annual\par Last dental appointment: February 2022; every six months\par \par Thyroid nodules. She has been clinically and biochemically euthyroid. Thyroid ultrasound in June 2018 demonstrated a 2.0 x 1.3 x 1.5 cm left inferior pole nodule and a right 1.0 x 0.5 x 1.0 cm right inferior nodule without suspicious features. Fine needle aspiration of the left inferior nodule in June 2018 was benign ([place] [facility]). Thyroid ultrasound from January 2020 demonstrated the right lower pole now 1.1 x 0.6 x 0.9 cm, hypoechoic, with microcalcifications meeting criteria for biopsy. We discussed that approximately 95 percent of all thyroid nodules are caused by benign conditions. We discussed the procedure for fine needle aspiration of thyroid nodules and possible results, including further testing for atypia of undetermined significance. She will call after biopsy to discuss results. \par \par Polycystic ovarian syndrome. She was diagnosed with polycystic ovarian syndrome in the setting of oligomenorrhea, polycystic ovaries on imaging, and biochemical/clinical evidence of hyperandrogenism. Previous evaluation for other causes of oligomenorrhea was unrevealing. She has a Mirena intrauterine device in place.\par \par Return to see me in 3 months. \par

## 2022-07-21 NOTE — ASSESSMENT
[FreeTextEntry1] : 42 year is here for a CPE. She was counselled on diet and exercise, drug and alcohol use, age appropriate health care maintenance including vaccines, seatbelts, sunscreen, stress reduction and safe sex. All questions asked/answered to the best of my ability.\par Labs sent including urine for microalbumin.\par Referred back to Bariatric Surgery.\par Breast sono ordered.\par \par Start ARB and f/u 2 weeks.\par \par Refer to ENT.

## 2022-07-21 NOTE — REVIEW OF SYSTEMS
[Earache] : no earache [Hearing Loss] : no hearing loss [Nosebleed] : no nosebleeds [Hoarseness] : no hoarseness [Nasal Discharge] : no nasal discharge [Sore Throat] : no sore throat [Postnasal Drip] : no postnasal drip [Negative] : Heme/Lymph [FreeTextEntry4] : jaw pain

## 2022-07-21 NOTE — HEALTH RISK ASSESSMENT
[Good] : ~his/her~  mood as  good [No falls in past year] : Patient reported no falls in the past year [0] : 2) Feeling down, depressed, or hopeless: Not at all (0) [PHQ-2 Negative - No further assessment needed] : PHQ-2 Negative - No further assessment needed [[id number]] : 0

## 2022-07-22 LAB
25(OH)D3 SERPL-MCNC: 25.2 NG/ML
ALBUMIN SERPL ELPH-MCNC: 4.4 G/DL
ALP BLD-CCNC: 41 U/L
ALT SERPL-CCNC: 25 U/L
ANION GAP SERPL CALC-SCNC: 15 MMOL/L
AST SERPL-CCNC: 25 U/L
BASOPHILS # BLD AUTO: 0.04 K/UL
BASOPHILS NFR BLD AUTO: 0.5 %
BILIRUB SERPL-MCNC: 1 MG/DL
BUN SERPL-MCNC: 13 MG/DL
CALCIUM SERPL-MCNC: 9.6 MG/DL
CHLORIDE SERPL-SCNC: 102 MMOL/L
CHOLEST SERPL-MCNC: 127 MG/DL
CO2 SERPL-SCNC: 22 MMOL/L
CREAT SERPL-MCNC: 0.61 MG/DL
CREAT SPEC-SCNC: 259 MG/DL
CREAT/PROT UR: 0.1 RATIO
EGFR: 114 ML/MIN/1.73M2
EOSINOPHIL # BLD AUTO: 0.22 K/UL
EOSINOPHIL NFR BLD AUTO: 2.8 %
ESTIMATED AVERAGE GLUCOSE: 131 MG/DL
GLUCOSE SERPL-MCNC: 81 MG/DL
HBA1C MFR BLD HPLC: 6.2 %
HCT VFR BLD CALC: 45.7 %
HDLC SERPL-MCNC: 52 MG/DL
HGB BLD-MCNC: 14 G/DL
IMM GRANULOCYTES NFR BLD AUTO: 0.5 %
LDLC SERPL CALC-MCNC: 59 MG/DL
LYMPHOCYTES # BLD AUTO: 2.18 K/UL
LYMPHOCYTES NFR BLD AUTO: 28.1 %
MAN DIFF?: NORMAL
MCHC RBC-ENTMCNC: 28.7 PG
MCHC RBC-ENTMCNC: 30.6 GM/DL
MCV RBC AUTO: 93.8 FL
MONOCYTES # BLD AUTO: 0.58 K/UL
MONOCYTES NFR BLD AUTO: 7.5 %
NEUTROPHILS # BLD AUTO: 4.7 K/UL
NEUTROPHILS NFR BLD AUTO: 60.6 %
NONHDLC SERPL-MCNC: 75 MG/DL
PLATELET # BLD AUTO: 349 K/UL
POTASSIUM SERPL-SCNC: 4.4 MMOL/L
PROT SERPL-MCNC: 7 G/DL
PROT UR-MCNC: 23 MG/DL
RBC # BLD: 4.87 M/UL
RBC # FLD: 14.1 %
SODIUM SERPL-SCNC: 139 MMOL/L
THYROGLOB AB SERPL-ACNC: <20 IU/ML
THYROPEROXIDASE AB SERPL IA-ACNC: 13.3 IU/ML
TRIGL SERPL-MCNC: 80 MG/DL
TSH SERPL-ACNC: 3.07 UIU/ML
VIT B12 SERPL-MCNC: 382 PG/ML
WBC # FLD AUTO: 7.76 K/UL

## 2022-08-03 ENCOUNTER — NON-APPOINTMENT (OUTPATIENT)
Age: 42
End: 2022-08-03

## 2022-08-04 ENCOUNTER — RESULT REVIEW (OUTPATIENT)
Age: 42
End: 2022-08-04

## 2022-08-04 ENCOUNTER — OUTPATIENT (OUTPATIENT)
Dept: OUTPATIENT SERVICES | Facility: HOSPITAL | Age: 42
LOS: 1 days | End: 2022-08-04

## 2022-08-04 ENCOUNTER — APPOINTMENT (OUTPATIENT)
Dept: ULTRASOUND IMAGING | Facility: CLINIC | Age: 42
End: 2022-08-04

## 2022-08-04 DIAGNOSIS — Z98.890 OTHER SPECIFIED POSTPROCEDURAL STATES: Chronic | ICD-10-CM

## 2022-08-04 DIAGNOSIS — Z41.9 ENCOUNTER FOR PROCEDURE FOR PURPOSES OTHER THAN REMEDYING HEALTH STATE, UNSPECIFIED: Chronic | ICD-10-CM

## 2022-08-04 DIAGNOSIS — K08.409 PARTIAL LOSS OF TEETH, UNSPECIFIED CAUSE, UNSPECIFIED CLASS: Chronic | ICD-10-CM

## 2022-08-04 PROCEDURE — 76642 ULTRASOUND BREAST LIMITED: CPT | Mod: 26,50

## 2022-08-04 PROCEDURE — 76536 US EXAM OF HEAD AND NECK: CPT | Mod: 26

## 2022-08-04 PROCEDURE — 88305 TISSUE EXAM BY PATHOLOGIST: CPT | Mod: 26

## 2022-08-04 PROCEDURE — 10005 FNA BX W/US GDN 1ST LES: CPT

## 2022-08-04 PROCEDURE — 88173 CYTOPATH EVAL FNA REPORT: CPT | Mod: 26
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
## 2022-08-05 NOTE — ASSESSMENT
[FreeTextEntry1] : Type 2 diabetes mellitus/elevated body mass index. HbA1c 5.9% in November 2021. No known history of micro- or macrovascular complications. We discussed the importance of diet and exercise and lifestyle modification for glycemic control and weight loss. She has declined follow-up with nutrition. We discussed pharmacologic options for glycemic control and weight loss. She is tolerating metformin and we will continue. She was tolerating Trulicity and we will restart; she declined consideration of Ozempic due to the need to use pen needles. She did not tolerate prior phentermine for weight loss. She will further consider surgical options for weight loss. \par Check complete blood count, comprehensive metabolic panel, lipid panel, urine microalbumin, TSH, vitamin B12, 25-hydroxyvitamin D \par Continue metformin ER 1000 mg twice daily\par Restart Trulicity 0.75 mg weekly \par She is not on a blood pressure regimen; last blood pressure within range\par She is not on a statin for cholesterol; can readdress pending laboratory results\par Nephrology screening: Due\par Last ophthalmology appointment: February 2022; annual\par Last dental appointment: February 2022; every six months\par \par Thyroid nodules. She has been clinically and biochemically euthyroid. Thyroid ultrasound in June 2018 demonstrated a 2.0 x 1.3 x 1.5 cm left inferior pole nodule and a right 1.0 x 0.5 x 1.0 cm right inferior nodule without suspicious features. Fine needle aspiration of the left inferior nodule in June 2018 was benign ([place] II). Thyroid ultrasound from January 2020 demonstrated the right lower pole now 1.1 x 0.6 x 0.9 cm, hypoechoic, with microcalcifications meeting criteria for biopsy. We discussed that approximately 95 percent of all thyroid nodules are caused by benign conditions. We discussed the procedure for fine needle aspiration of thyroid nodules and possible results, including further testing for atypia of undetermined significance. She will call after biopsy to discuss results. \par \par Polycystic ovarian syndrome. She was diagnosed with polycystic ovarian syndrome in the setting of oligomenorrhea, polycystic ovaries on imaging, and biochemical/clinical evidence of hyperandrogenism. Previous evaluation for other causes of oligomenorrhea was unrevealing. She has a Mirena intrauterine device in place.\par \par Return to see me in 3 months.

## 2022-08-05 NOTE — DATA REVIEWED
[FreeTextEntry1] : Laboratories (November 10, 2021) reviewed and significant for: \par Unremarkable complete blood count\par HbA1c 5.9%\par TSH 3.54 uIU/mL\par \par Thyroid ultrasound (January 15, 2020) reviewed and significant for:\par RIGHT LOBE:\par Dimensions: 5.1 x 1.4 x 1.9 cm (sagittal x AP x transverse)\par Echotexture: homogeneous\par Vascularity: normovascular\par The right lobe contains two visible nodules.\par \par Nodule 1:\par Location: Lower pole \par Dimensions: 1.1 x 0.6 x 0.9 cm (sagittal x AP x transverse), which previously measured 1.0 x 0.5 x 1.0 cm.\par Composition: Primarily cystic with a small calcified solid component.\par For solid nodules or for the solid portion of a partially cystic nodule, does the solid portion have any of the following suspicious features?\par -  YES: Hypoechoic\par -  Yes: Microcalcifications\par -  No: Irregular margin (infiltrative or microlobulated)\par -  No: Taller than wide (AP dimension > transverse)\par -  No: Extrathyroidal extension\par -  No: Interrupted rim calcification with soft tissue extrusion\par \par Nodule 2:\par Location: Interpolar \par Dimensions: 0.6 x 0.5 x 0.6 cm (sagittal x AP x transverse), which was not previously seen.\par Composition: Solid with a cystic rim\par For solid nodules or for the solid portion of a partially cystic nodule, does the solid portion have any of the following suspicious features?\par -  NO: Hypoechoic\par -  Yes: Microcalcifications\par -  No: Irregular margin (infiltrative or microlobulated)\par -  No: Taller than wide (AP dimension > transverse)\par -  No: Extrathyroidal extension\par -  No: Interrupted rim calcification with soft tissue extrusion\par \par LEFT LOBE:\par Dimensions: 5.3 x 1.4 x 1.8 cm (sagittal x AP x transverse)\par Echotexture: homogeneous\par Vascularity: normovascular\par The left lobe contains one visible nodule.\par \par Nodule 1:\par Location: Lower pole \par Dimensions: 1.6 x 1.3 x 1.6 cm (sagittal x AP x transverse), which previously measured 2.0 x 1.3 x 1.5 cm.\par Composition: Spongiform\par For solid nodules or for the solid portion of a partially cystic nodule, does the solid portion have any of the following suspicious features?\par -  No: Hypoechoic\par -  No: Microcalcifications\par -  No: Irregular margin (infiltrative or microlobulated)\par -  No: Taller than wide (AP dimension > transverse)\par -  No: Extrathyroidal extension\par -  No: Interrupted rim calcification with soft tissue extrusion\par \par ISTHMUS:\par Dimensions: 0.3 cm AP\par The isthmus lobe contains no visible nodules.\par \par CERVICAL LYMPH NODE EVALUATION (for any abnormal lymph node, please note the following: location, size, calcification, cystic area, absence of central hilum, round shape, abnormal blood flow): \par -  Bilateral prominent lymph nodes all of which demonstrate benign galileo architecture.\par \par PARATHYROID EXAMINATION:\par -  Parathyroid glands not visualized.\par \par IMPRESSION:\par Multinodular thyroid gland as above. There is a hypoechoic solid 1.1 cm nodule noted with microcalcification within the lower pole of the right thyroid lobe which meets criteria for ultrasound-guided FNA.

## 2022-08-05 NOTE — ADDENDUM
[FreeTextEntry1] : Recent thyroid ultrasound as below, demonstrating subcentimeter nodules on the right and a left lower pole 1.4 x 1.1 x 2.0 cm hypoechoic nodule with microcalcifications. The left lower pole nodule was biopsied; cytopathology pending.

## 2022-08-09 LAB — NON-GYNECOLOGICAL CYTOLOGY STUDY: SIGNIFICANT CHANGE UP

## 2022-12-19 ENCOUNTER — RX RENEWAL (OUTPATIENT)
Age: 42
End: 2022-12-19

## 2023-01-18 ENCOUNTER — APPOINTMENT (OUTPATIENT)
Dept: INTERNAL MEDICINE | Facility: CLINIC | Age: 43
End: 2023-01-18
Payer: COMMERCIAL

## 2023-01-18 VITALS
HEART RATE: 107 BPM | HEIGHT: 62 IN | BODY MASS INDEX: 52.63 KG/M2 | WEIGHT: 286 LBS | DIASTOLIC BLOOD PRESSURE: 83 MMHG | SYSTOLIC BLOOD PRESSURE: 130 MMHG | TEMPERATURE: 96.9 F

## 2023-01-18 DIAGNOSIS — N84.0 POLYP OF CORPUS UTERI: ICD-10-CM

## 2023-01-18 PROCEDURE — 99214 OFFICE O/P EST MOD 30 MIN: CPT | Mod: 25

## 2023-01-18 PROCEDURE — 36415 COLL VENOUS BLD VENIPUNCTURE: CPT

## 2023-01-18 NOTE — HEALTH RISK ASSESSMENT
[0] : 2) Feeling down, depressed, or hopeless: Not at all (0) [PHQ-2 Negative - No further assessment needed] : PHQ-2 Negative - No further assessment needed [[id number]] : 0

## 2023-01-18 NOTE — PHYSICAL EXAM
[Normal Sclera/Conjunctiva] : normal sclera/conjunctiva [Normal Rate] : normal rate  [No Edema] : there was no peripheral edema [Normal] : affect was normal and insight and judgment were intact [de-identified] : obese

## 2023-01-18 NOTE — HISTORY OF PRESENT ILLNESS
[de-identified] : 42 y/o obese female presents for f/u.\par Here over the summer and started on ARB for HTn. Was supposed to f/u 2 weeks later but never did. Is taking the meds.\par \par Due for MAMMO/SONO in February. \par \par Doesn't take Trulicity regularly.\par Does take Metformin as directed. Needs labs prior to ENDO appointment.\par \par C/o L knee pain. Was doing PT prior to the pandemic but then stopped. Wants to go back. \par Never followed up with bariatric surgery.

## 2023-01-18 NOTE — ASSESSMENT
[FreeTextEntry1] : Pt has some compliance issues. \par Plan is to:\par 1. Continue ARB- BP fine\par 2. send labs\par 3. PT ordered\par 4. MAMMO/SONO ordered.\par

## 2023-01-19 ENCOUNTER — RX RENEWAL (OUTPATIENT)
Age: 43
End: 2023-01-19

## 2023-01-19 LAB
25(OH)D3 SERPL-MCNC: 29.4 NG/ML
ALBUMIN SERPL ELPH-MCNC: 4.5 G/DL
ALP BLD-CCNC: 43 U/L
ALT SERPL-CCNC: 27 U/L
ANION GAP SERPL CALC-SCNC: 21 MMOL/L
AST SERPL-CCNC: 21 U/L
BASOPHILS # BLD AUTO: 0.04 K/UL
BASOPHILS NFR BLD AUTO: 0.6 %
BILIRUB SERPL-MCNC: 0.8 MG/DL
BUN SERPL-MCNC: 9 MG/DL
CALCIUM SERPL-MCNC: 9.8 MG/DL
CHLORIDE SERPL-SCNC: 97 MMOL/L
CHOLEST SERPL-MCNC: 127 MG/DL
CO2 SERPL-SCNC: 20 MMOL/L
CREAT SERPL-MCNC: 0.68 MG/DL
CREAT SPEC-SCNC: 112 MG/DL
CREAT/PROT UR: 0.1 RATIO
EGFR: 111 ML/MIN/1.73M2
EOSINOPHIL # BLD AUTO: 0.25 K/UL
EOSINOPHIL NFR BLD AUTO: 3.7 %
ESTIMATED AVERAGE GLUCOSE: 134 MG/DL
GLUCOSE SERPL-MCNC: 89 MG/DL
HBA1C MFR BLD HPLC: 6.3 %
HCT VFR BLD CALC: 44.9 %
HDLC SERPL-MCNC: 62 MG/DL
HGB BLD-MCNC: 14.2 G/DL
IMM GRANULOCYTES NFR BLD AUTO: 0.3 %
LDLC SERPL CALC-MCNC: 48 MG/DL
LYMPHOCYTES # BLD AUTO: 1.98 K/UL
LYMPHOCYTES NFR BLD AUTO: 29.7 %
MAN DIFF?: NORMAL
MCHC RBC-ENTMCNC: 28.3 PG
MCHC RBC-ENTMCNC: 31.6 GM/DL
MCV RBC AUTO: 89.6 FL
MONOCYTES # BLD AUTO: 0.4 K/UL
MONOCYTES NFR BLD AUTO: 6 %
NEUTROPHILS # BLD AUTO: 3.98 K/UL
NEUTROPHILS NFR BLD AUTO: 59.7 %
NONHDLC SERPL-MCNC: 65 MG/DL
PLATELET # BLD AUTO: 353 K/UL
POTASSIUM SERPL-SCNC: 4.4 MMOL/L
PROT SERPL-MCNC: 7.2 G/DL
PROT UR-MCNC: 8 MG/DL
RBC # BLD: 5.01 M/UL
RBC # FLD: 14.2 %
SODIUM SERPL-SCNC: 138 MMOL/L
TRIGL SERPL-MCNC: 84 MG/DL
TSH SERPL-ACNC: 2.1 UIU/ML
VIT B12 SERPL-MCNC: 406 PG/ML
WBC # FLD AUTO: 6.67 K/UL

## 2023-01-26 ENCOUNTER — APPOINTMENT (OUTPATIENT)
Dept: ENDOCRINOLOGY | Facility: CLINIC | Age: 43
End: 2023-01-26
Payer: COMMERCIAL

## 2023-01-26 VITALS
DIASTOLIC BLOOD PRESSURE: 86 MMHG | BODY MASS INDEX: 52.31 KG/M2 | HEART RATE: 97 BPM | SYSTOLIC BLOOD PRESSURE: 138 MMHG | WEIGHT: 286 LBS

## 2023-01-26 LAB — GLUCOSE BLDC GLUCOMTR-MCNC: 89

## 2023-01-26 PROCEDURE — 82962 GLUCOSE BLOOD TEST: CPT

## 2023-01-26 PROCEDURE — 99214 OFFICE O/P EST MOD 30 MIN: CPT | Mod: 25

## 2023-01-26 NOTE — ASSESSMENT
[FreeTextEntry1] : Type 2 diabetes mellitus/elevated body mass index. HbA1c 6.3% in January 2023 and glucose 89 mg/dL today. No known history of micro- or macrovascular complications. We discussed the importance of diet and exercise and lifestyle modification for glycemic control and weight loss. She has declined follow-up with nutrition. We discussed pharmacologic options for glycemic control and weight loss. She is tolerating metformin and we will continue. She was tolerating Trulicity and we will adjust as below. She is amenable to a trial of a combined GLP-1/GIP receptor agonist once she completes her current Trulicity prescription. We discussed the risks and benefits of the GLP-1/GIP receptor agonist class, including but not limited to nausea, pancreatitis, medullary thyroid cancer. We reviewed subcutaneous injection technique, storage, and sharps disposal. She did not tolerate prior phentermine for weight loss. She will further consider surgical options for weight loss. \par Continue metformin ER 1000 mg twice daily\par Adjust Trulicity to 1.5 mg weekly for now, then switch to Mounjaro\par She is not on a blood pressure regimen; blood pressure around goal\par She is not on a statin for cholesterol; LDL below 70 mg/dL\par Nephrology screening: Due\par Last ophthalmology appointment: February 2022; annual\par Last dental appointment: Every six months\par \par Thyroid nodules. She has been clinically and biochemically euthyroid. Thyroid ultrasound in June 2018 demonstrated a 2.0 x 1.3 x 1.5 cm left inferior pole nodule and a right 1.0 x 0.5 x 1.0 cm right inferior nodule without suspicious features. Fine needle aspiration of the left inferior nodule in June 2018 was benign ([place] II). Thyroid ultrasound from January 2020 demonstrated the right lower pole now 1.1 x 0.6 x 0.9 cm, hypoechoic, with microcalcifications meeting criteria for biopsy. Thyroid ultrasound from August 2022 demonstrated subcentimeter nodules on the right and a left lower pole 1.4 x 1.1 x 2.0 cm hypoechoic nodule with microcalcifications. The left lower pole nodule was biopsied with cytopathology benign ([place] II). We discussed that approximately 95 percent of all thyroid nodules are caused by benign conditions. \par Interval thyroid ultrasound in August 2023\par \par Polycystic ovarian syndrome. She was diagnosed with polycystic ovarian syndrome in the setting of oligomenorrhea, polycystic ovaries on imaging, and biochemical/clinical evidence of hyperandrogenism. Previous evaluation for other causes of oligomenorrhea was unrevealing. She has a Mirena intrauterine device in place.\par \par Return to see me in 3-4 months.

## 2023-01-26 NOTE — PHYSICAL EXAM
[Alert] : alert [Healthy Appearance] : healthy appearance [No Acute Distress] : no acute distress [Normal Sclera/Conjunctiva] : normal sclera/conjunctiva [Normal Hearing] : hearing was normal [No Respiratory Distress] : no respiratory distress [Normal Insight/Judgement] : insight and judgment were intact [Kyphosis] : no kyphosis present [No Stigmata of Cushings Syndrome] : no stigmata of Cushings Syndrome [Normal Gait] : normal gait [Right foot was examined, including] : right foot ~C was examined, including visual inspection with sensory and pulse exams [Left foot was examined, including] : left foot ~C was examined, including visual inspection with sensory and pulse exams [Normal] : normal [2+] : 2+ in the dorsalis pedis [Diminished Throughout Both Feet] : normal tactile sensation with monofilament testing throughout both feet [de-identified] : no moon facies, no supraclavicular fat pads
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
## 2023-02-13 ENCOUNTER — RX RENEWAL (OUTPATIENT)
Age: 43
End: 2023-02-13

## 2023-03-10 ENCOUNTER — RX RENEWAL (OUTPATIENT)
Age: 43
End: 2023-03-10

## 2023-03-20 NOTE — HISTORY OF PRESENT ILLNESS
[FreeTextEntry1] : Problem\par 1)Complex atypical hyperplasia \par \par Previous Therapy\par 1)EMC 3/26/19\par    a)Endometrial polyp curettage- Fragments of endometrium with complex atypical hyperplasia\par    b)EMC- Fragments of endometrium with complex atypical hyperplasia \par 2) Mirena IUD 5/3/19 4 = No assist / stand by assistance

## 2023-03-23 ENCOUNTER — APPOINTMENT (OUTPATIENT)
Dept: BARIATRICS | Facility: CLINIC | Age: 43
End: 2023-03-23
Payer: COMMERCIAL

## 2023-03-23 VITALS
HEIGHT: 62 IN | TEMPERATURE: 97.7 F | HEART RATE: 90 BPM | BODY MASS INDEX: 52.26 KG/M2 | WEIGHT: 284 LBS | DIASTOLIC BLOOD PRESSURE: 93 MMHG | OXYGEN SATURATION: 97 % | SYSTOLIC BLOOD PRESSURE: 143 MMHG

## 2023-03-23 PROCEDURE — 99205 OFFICE O/P NEW HI 60 MIN: CPT

## 2023-03-23 NOTE — ADDENDUM
[FreeTextEntry1] : Documented by Emilie Padron acting as a scribe for JC Harrington on 03/23/2023\par

## 2023-03-23 NOTE — HISTORY OF PRESENT ILLNESS
[de-identified] : Patient is a 43 year old F with a PMHx of obesity (BMI 51 ), pre-DM (on metformin), HTN, knee pain, endometrial hyperplasia (on progesterone), asthma (dx pediatric, controlled), PCOS (dx 2016) and no significant abdominal surgery who presents here today feeling well for initial bariatric consult. 's patient. States she was recommended by  for DS but she would like a less invasive bariatric procedure.Denies acid reflux and heart burn. Currently, on IUD. She reports fertility issues due to PCOS. Endorses interest in conceiving after the bariatric surgery. Discussed all the option of bariatric surgery with the patient. All risks and benefits explained. Importance of a healthy lifestyle with a healthy diet and an active lifestyle emphasized. Patient has been counseled to prevent pregnancy at least until 18 months after bariatric surgery. Have ordered lab work and will have the patient see the nutritionist. Will begin the bariatric work up and decide the course of surgery at the final visit.\par \par We discussed at length surgical and non-surgical options and that non surgical approaches are unlikely to lead to long term, sustained weight loss. We also discussed that surgery alone is unlikely to be successful but should rather be seen as a tool for weight loss to be integrated with physical activity and nutritional counseling. The patient verbalized understanding and agrees to proceed with the evaluation. All risks of surgery were explained to the patient including the risks of leaks, infections, blood clots and death.\par

## 2023-03-23 NOTE — ASSESSMENT
[FreeTextEntry1] : Patient is a 43 year old F with a PMHx of obesity (BMI 51 ), pre-DM (on metformin), HTN, knee pain, endometrial hyperplasia (on progesterone), asthma (dx pediatric, controlled), PCOS (dx 2016) and no significant abdominal surgery who presents here today feeling well for initial bariatric consult. 's patient. States she was recommended by  for DS but she would like a less invasive bariatric procedure.Denies acid reflux and heart burn. Currently, on IUD. She reports fertility issues due to PCOS. Endorses interest in conceiving after the bariatric surgery. Discussed all the option of bariatric surgery with the patient. All risks and benefits explained. Importance of a healthy lifestyle with a healthy diet and an active lifestyle emphasized. Patient has been counseled to prevent pregnancy at least until 18 months after bariatric surgery. Have ordered lab work and will have the patient see the nutritionist. Will begin the bariatric work up and decide the course of surgery at the final visit.\par \par We discussed at length surgical and non-surgical options and that non surgical approaches are unlikely to lead to long term, sustained weight loss. We also discussed that surgery alone is unlikely to be successful but should rather be seen as a tool for weight loss to be integrated with physical activity and nutritional counseling. The patient verbalized understanding and agrees to proceed with the evaluation. All risks of surgery were explained to the patient including the risks of leaks, infections, blood clots and death.\par \par

## 2023-03-23 NOTE — END OF VISIT
[FreeTextEntry3] : All medical record entries made by the Scribe were at my, JC Harrington , direction and personally dictated by me on 03/23/2023 . I have reviewed the chart and agree that the record accurately reflects my personal performance of the history, physical exam, assessment and plan. I have also personally directed, reviewed, and agreed with the chart.\par  [Time Spent: ___ minutes] : I have spent [unfilled] minutes of time on the encounter.

## 2023-03-24 LAB
25(OH)D3 SERPL-MCNC: 32.1 NG/ML
ALBUMIN SERPL ELPH-MCNC: 4.4 G/DL
ALP BLD-CCNC: 41 U/L
ALT SERPL-CCNC: 25 U/L
ANION GAP SERPL CALC-SCNC: 14 MMOL/L
APPEARANCE: CLEAR
APTT BLD: 36.3 SEC
AST SERPL-CCNC: 21 U/L
BACTERIA: NEGATIVE
BASOPHILS # BLD AUTO: 0.05 K/UL
BASOPHILS NFR BLD AUTO: 0.7 %
BILIRUB SERPL-MCNC: 0.6 MG/DL
BILIRUBIN URINE: NEGATIVE
BLOOD URINE: ABNORMAL
BUN SERPL-MCNC: 10 MG/DL
CALCIUM OXALATE CRYSTALS: ABNORMAL
CALCIUM SERPL-MCNC: 9.8 MG/DL
CALCIUM SERPL-MCNC: 9.8 MG/DL
CHLORIDE SERPL-SCNC: 98 MMOL/L
CHOLEST SERPL-MCNC: 126 MG/DL
CO2 SERPL-SCNC: 27 MMOL/L
COLOR: YELLOW
CREAT SERPL-MCNC: 0.55 MG/DL
EGFR: 117 ML/MIN/1.73M2
EOSINOPHIL # BLD AUTO: 0.32 K/UL
EOSINOPHIL NFR BLD AUTO: 4.2 %
FOLATE SERPL-MCNC: >20 NG/ML
GLUCOSE QUALITATIVE U: NEGATIVE
GLUCOSE SERPL-MCNC: 81 MG/DL
HCG SERPL QL: NEGATIVE
HCT VFR BLD CALC: 44.2 %
HDLC SERPL-MCNC: 58 MG/DL
HGB BLD-MCNC: 13.9 G/DL
HYALINE CASTS: 0 /LPF
IMM GRANULOCYTES NFR BLD AUTO: 0.4 %
INR PPP: 1.07 RATIO
IRON SATN MFR SERPL: 19 %
IRON SERPL-MCNC: 72 UG/DL
KETONES URINE: ABNORMAL
LDLC SERPL CALC-MCNC: 52 MG/DL
LEUKOCYTE ESTERASE URINE: NEGATIVE
LYMPHOCYTES # BLD AUTO: 1.63 K/UL
LYMPHOCYTES NFR BLD AUTO: 21.6 %
MAN DIFF?: NORMAL
MCHC RBC-ENTMCNC: 28.8 PG
MCHC RBC-ENTMCNC: 31.4 GM/DL
MCV RBC AUTO: 91.5 FL
MICROSCOPIC-UA: NORMAL
MONOCYTES # BLD AUTO: 0.46 K/UL
MONOCYTES NFR BLD AUTO: 6.1 %
NEUTROPHILS # BLD AUTO: 5.07 K/UL
NEUTROPHILS NFR BLD AUTO: 67 %
NITRITE URINE: NEGATIVE
NONHDLC SERPL-MCNC: 68 MG/DL
PAPP-A SERPL-ACNC: <1 MIU/ML
PARATHYROID HORMONE INTACT: 80 PG/ML
PH URINE: 6
PLATELET # BLD AUTO: 342 K/UL
POTASSIUM SERPL-SCNC: 4.6 MMOL/L
PREALB SERPL NEPH-MCNC: 21 MG/DL
PROT SERPL-MCNC: 7.2 G/DL
PROTEIN URINE: NORMAL
PT BLD: 12.4 SEC
RBC # BLD: 4.83 M/UL
RBC # FLD: 14.1 %
RED BLOOD CELLS URINE: 22 /HPF
SODIUM SERPL-SCNC: 138 MMOL/L
SPECIFIC GRAVITY URINE: 1.03
SQUAMOUS EPITHELIAL CELLS: 5 /HPF
TIBC SERPL-MCNC: 383 UG/DL
TRIGL SERPL-MCNC: 80 MG/DL
TSH SERPL-ACNC: 2.56 UIU/ML
UIBC SERPL-MCNC: 311 UG/DL
UROBILINOGEN URINE: NORMAL
VIT B12 SERPL-MCNC: 506 PG/ML
WBC # FLD AUTO: 7.56 K/UL
WHITE BLOOD CELLS URINE: 2 /HPF

## 2023-03-25 LAB
CA-I SERPL-SCNC: 5 MG/DL
H PYLORI AB SER-ACNC: <5 UNITS
H PYLORI IGA SER-ACNC: 11.3 UNITS

## 2023-03-26 LAB — ZINC SERPL-MCNC: 78 UG/DL

## 2023-03-27 LAB — VIT B1 SERPL-MCNC: 194.7 NMOL/L

## 2023-03-28 LAB — VIT A SERPL-MCNC: 42.6 UG/DL

## 2023-03-29 LAB
A-TOCOPHEROL VIT E SERPL-MCNC: 8.7 MG/L
BETA+GAMMA TOCOPHEROL SERPL-MCNC: 1 MG/L

## 2023-03-31 ENCOUNTER — RX RENEWAL (OUTPATIENT)
Age: 43
End: 2023-03-31

## 2023-04-04 ENCOUNTER — RESULT REVIEW (OUTPATIENT)
Age: 43
End: 2023-04-04

## 2023-04-04 ENCOUNTER — APPOINTMENT (OUTPATIENT)
Dept: ULTRASOUND IMAGING | Facility: CLINIC | Age: 43
End: 2023-04-04
Payer: COMMERCIAL

## 2023-04-04 ENCOUNTER — APPOINTMENT (OUTPATIENT)
Dept: MAMMOGRAPHY | Facility: CLINIC | Age: 43
End: 2023-04-04
Payer: COMMERCIAL

## 2023-04-04 ENCOUNTER — OUTPATIENT (OUTPATIENT)
Dept: OUTPATIENT SERVICES | Facility: HOSPITAL | Age: 43
LOS: 1 days | End: 2023-04-04

## 2023-04-04 DIAGNOSIS — Z98.890 OTHER SPECIFIED POSTPROCEDURAL STATES: Chronic | ICD-10-CM

## 2023-04-04 DIAGNOSIS — K08.409 PARTIAL LOSS OF TEETH, UNSPECIFIED CAUSE, UNSPECIFIED CLASS: Chronic | ICD-10-CM

## 2023-04-04 DIAGNOSIS — Z41.9 ENCOUNTER FOR PROCEDURE FOR PURPOSES OTHER THAN REMEDYING HEALTH STATE, UNSPECIFIED: Chronic | ICD-10-CM

## 2023-04-04 PROCEDURE — 77067 SCR MAMMO BI INCL CAD: CPT | Mod: 26

## 2023-04-04 PROCEDURE — 76641 ULTRASOUND BREAST COMPLETE: CPT | Mod: 26,50

## 2023-04-04 PROCEDURE — 77063 BREAST TOMOSYNTHESIS BI: CPT | Mod: 26

## 2023-04-10 ENCOUNTER — APPOINTMENT (OUTPATIENT)
Dept: BARIATRICS | Facility: CLINIC | Age: 43
End: 2023-04-10

## 2023-04-10 ENCOUNTER — RX RENEWAL (OUTPATIENT)
Age: 43
End: 2023-04-10

## 2023-04-18 ENCOUNTER — APPOINTMENT (OUTPATIENT)
Dept: GYNECOLOGIC ONCOLOGY | Facility: CLINIC | Age: 43
End: 2023-04-18
Payer: COMMERCIAL

## 2023-04-18 VITALS
WEIGHT: 287 LBS | HEART RATE: 95 BPM | HEIGHT: 62 IN | BODY MASS INDEX: 52.81 KG/M2 | TEMPERATURE: 97.2 F | DIASTOLIC BLOOD PRESSURE: 83 MMHG | OXYGEN SATURATION: 95 % | SYSTOLIC BLOOD PRESSURE: 135 MMHG

## 2023-04-18 PROCEDURE — 58100 BIOPSY OF UTERUS LINING: CPT

## 2023-04-18 PROCEDURE — 99214 OFFICE O/P EST MOD 30 MIN: CPT | Mod: 25

## 2023-04-18 PROCEDURE — 81025 URINE PREGNANCY TEST: CPT

## 2023-04-19 LAB
HCG UR QL: NEGATIVE
QUALITY CONTROL: YES

## 2023-04-19 NOTE — HISTORY OF PRESENT ILLNESS
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 endometrium appears markedly thickened\par 4) EMB 8/28/2019\par  a) Rare foci of complex atypical hyperplasia in a background of exogenous hormone-related change.\par 5) EMB 11/22/2019 \par  a) Endometrium with foci of complex atypical hyperplasia in a background of exogenous hormone related changes. \par 6) EMB 2/27/2020\par  a) CAH\par 7) Pelvic US 7/7/20\par  a) uterus 8.4cm\par  b) endometrium 0.8cm , prominent echogenicity along its superior extent\par  c) IUD low in position \par 8) D&C hysteroscopy 7/27/20, removal of IUD and replacement of Mirena IUD\par  a) Uterus, intrauterine device; removal: -Intrauterine device-IUD (gross examination only).\par  b) Endometrium, curettings:\par - Endometrium with foci of complex atypical hyperplasia\par - Foci consistent with portions of benign endometrial polyp\par - Extensive areas consistent with progestin therapy effect\par - Acute and chronic endometritis - see note \par 8) S/P EMB 1/8/2021\par   a) Endometrial curettings/ biopsy:\par  - Inactive endometrium with pseudodecidualized stroma, compatible with exogenous hormonal effect. No\par    hyperplasia or atypia is seen\par 9) S/P EMB 7/20/21\par    a) - Superficial fragments of endometrium with marked stromal decidualization suggestive of exogenous hormonal administration\par       -  No endometrial hyperplasia or atypia seen\par 10) 10/29/21- EMB-- Fragments of inactive endometrium with marked pseudodecidualization and inflammation.\par   - Benign endocervical tissue.\par   - Fibrin and inflammatory exudate.\par   - Negative for hyperplasia.\par 11) EMB 2/2022 - Endometrium with marked stromal pseudodecidualization consistent\par  with exogenous hormone-related change\par 12) EMB 6/2022\par     a) Primarily fibrinoid debris with multiple chronic inflammatory cells\par     - Superficial fragments of endometrium with stromal decidualization consistent with exogenous hormone therapy\par     - Patchy stromal chronic inflammation with rare plasma cell and  occasional neutrophiles\par     - No atypical hyperplasia seen\par \par

## 2023-04-19 NOTE — PHYSICAL EXAM
[Normal] : Recto-Vaginal Exam: Normal [de-identified] : morbid obesity, nontender [de-identified] : IUD strings visualized. Nontender [de-identified] : could not appreciate 2' body habitus [de-identified] : cannot appreciate 2' to body habitus

## 2023-04-19 NOTE — PROCEDURE
[Endometrial Biopsy] : an endometrial biopsy [Other: ___] : [unfilled] [Patient] : the patient [Betadine] : betadine [Yes] : the specimen was sent to pathology [No Complications] : none [Tolerated Well] : the patient tolerated the procedure well [Post procedure instructions and information given] : post procedure instructions and information given and reviewed with patient. [FreeTextEntry1] : uCG negative. Pipelle introduced to 8cm two passes with scant tissue.

## 2023-04-19 NOTE — ASSESSMENT
[FreeTextEntry1] : Patient once again counseled regarding management of CAH. I once again explained that hysterectomy is recommended when childbearing is complete.\par \par Patient encouraged to follow up with bariatric surgery and ALEK. Patient recently saw Dr. Kelley and is motivated to move forward with surgery. \par \par [] EMBx\par [] F/U in 6 months\par \par

## 2023-04-21 ENCOUNTER — APPOINTMENT (OUTPATIENT)
Dept: BARIATRICS | Facility: CLINIC | Age: 43
End: 2023-04-21
Payer: COMMERCIAL

## 2023-04-21 VITALS — BODY MASS INDEX: 52.44 KG/M2 | HEIGHT: 62 IN | WEIGHT: 285 LBS

## 2023-04-21 LAB — CORE LAB BIOPSY: NORMAL

## 2023-04-21 PROCEDURE — 97802 MEDICAL NUTRITION INDIV IN: CPT | Mod: 95

## 2023-04-24 ENCOUNTER — APPOINTMENT (OUTPATIENT)
Dept: BARIATRICS | Facility: CLINIC | Age: 43
End: 2023-04-24

## 2023-04-24 ENCOUNTER — APPOINTMENT (OUTPATIENT)
Dept: INTERNAL MEDICINE | Facility: CLINIC | Age: 43
End: 2023-04-24
Payer: COMMERCIAL

## 2023-04-24 ENCOUNTER — NON-APPOINTMENT (OUTPATIENT)
Age: 43
End: 2023-04-24

## 2023-04-24 VITALS — HEIGHT: 62 IN | WEIGHT: 285 LBS | BODY MASS INDEX: 52.44 KG/M2

## 2023-04-24 DIAGNOSIS — M17.10 UNILATERAL PRIMARY OSTEOARTHRITIS, UNSPECIFIED KNEE: ICD-10-CM

## 2023-04-24 PROCEDURE — 99213 OFFICE O/P EST LOW 20 MIN: CPT | Mod: 95

## 2023-04-24 NOTE — HISTORY OF PRESENT ILLNESS
[Medical Office: ([facility])___] : at the medical office located in  [Home] : at home, [unfilled] , at the time of the visit. [Verbal consent obtained from patient] : the patient, [unfilled] [de-identified] : Patient is a 43 year old F with a PMHx of obesity (BMI 51 ), pre-DM (on metformin), HTN, knee pain, endometrial hyperplasia (on progesterone), asthma (dx pediatric, controlled), PCOS (dx 2016) and no significant abdominal surgery who presents here today for letter of support prior to sheduling Bariatric surgery.\par She swtiched bariatric surgeons as Dr Mata left the practice.\par She has seen nutrition.\par She has been taking a GLP-1 agonist for 3 years without singnificant improvement. \par

## 2023-04-24 NOTE — ASSESSMENT
[FreeTextEntry1] : Pt is a candidate for  bariatric surgery as she is morbidly obese with multiple complicating factors.\par Letter of support provided to surgeon.\par

## 2023-04-24 NOTE — HEALTH RISK ASSESSMENT
[0] : 2) Feeling down, depressed, or hopeless: Not at all (0) [PHQ-2 Negative - No further assessment needed] : PHQ-2 Negative - No further assessment needed [[id number]] : 0 [Never] : Never

## 2023-05-05 ENCOUNTER — RX RENEWAL (OUTPATIENT)
Age: 43
End: 2023-05-05

## 2023-05-12 ENCOUNTER — APPOINTMENT (OUTPATIENT)
Dept: BARIATRICS | Facility: CLINIC | Age: 43
End: 2023-05-12
Payer: COMMERCIAL

## 2023-05-12 PROCEDURE — 97803 MED NUTRITION INDIV SUBSEQ: CPT | Mod: 95

## 2023-05-15 ENCOUNTER — OUTPATIENT (OUTPATIENT)
Dept: OUTPATIENT SERVICES | Facility: HOSPITAL | Age: 43
LOS: 1 days | Discharge: HOME | End: 2023-05-15
Payer: COMMERCIAL

## 2023-05-15 ENCOUNTER — TRANSCRIPTION ENCOUNTER (OUTPATIENT)
Age: 43
End: 2023-05-15

## 2023-05-15 VITALS
HEART RATE: 82 BPM | WEIGHT: 279.99 LBS | HEIGHT: 62 IN | OXYGEN SATURATION: 95 % | DIASTOLIC BLOOD PRESSURE: 80 MMHG | RESPIRATION RATE: 18 BRPM | SYSTOLIC BLOOD PRESSURE: 137 MMHG

## 2023-05-15 DIAGNOSIS — Z98.890 OTHER SPECIFIED POSTPROCEDURAL STATES: Chronic | ICD-10-CM

## 2023-05-15 DIAGNOSIS — Z41.9 ENCOUNTER FOR PROCEDURE FOR PURPOSES OTHER THAN REMEDYING HEALTH STATE, UNSPECIFIED: Chronic | ICD-10-CM

## 2023-05-15 DIAGNOSIS — K08.409 PARTIAL LOSS OF TEETH, UNSPECIFIED CAUSE, UNSPECIFIED CLASS: Chronic | ICD-10-CM

## 2023-05-15 LAB — GLUCOSE BLDC GLUCOMTR-MCNC: 105 MG/DL — HIGH (ref 70–99)

## 2023-05-15 PROCEDURE — 88305 TISSUE EXAM BY PATHOLOGIST: CPT

## 2023-05-15 PROCEDURE — 43239 EGD BIOPSY SINGLE/MULTIPLE: CPT

## 2023-05-15 PROCEDURE — 82962 GLUCOSE BLOOD TEST: CPT

## 2023-05-15 PROCEDURE — 88305 TISSUE EXAM BY PATHOLOGIST: CPT | Mod: 26

## 2023-05-16 LAB — SURGICAL PATHOLOGY STUDY: SIGNIFICANT CHANGE UP

## 2023-05-30 ENCOUNTER — APPOINTMENT (OUTPATIENT)
Dept: ENDOCRINOLOGY | Facility: CLINIC | Age: 43
End: 2023-05-30
Payer: COMMERCIAL

## 2023-05-30 VITALS
SYSTOLIC BLOOD PRESSURE: 127 MMHG | BODY MASS INDEX: 51.76 KG/M2 | DIASTOLIC BLOOD PRESSURE: 84 MMHG | HEART RATE: 98 BPM | WEIGHT: 283 LBS

## 2023-05-30 LAB
GLUCOSE BLDC GLUCOMTR-MCNC: 99
HBA1C MFR BLD HPLC: 6

## 2023-05-30 PROCEDURE — 99214 OFFICE O/P EST MOD 30 MIN: CPT | Mod: 25

## 2023-05-30 PROCEDURE — 82962 GLUCOSE BLOOD TEST: CPT

## 2023-05-30 PROCEDURE — 83036 HEMOGLOBIN GLYCOSYLATED A1C: CPT | Mod: QW

## 2023-05-30 RX ORDER — LEVONORGESTREL 52 MG/1
INTRAUTERINE DEVICE INTRAUTERINE
Refills: 0 | Status: ACTIVE | COMMUNITY

## 2023-05-30 RX ORDER — TIRZEPATIDE 2.5 MG/.5ML
2.5 INJECTION, SOLUTION SUBCUTANEOUS
Qty: 1 | Refills: 0 | Status: COMPLETED | OUTPATIENT
Start: 2023-05-30 | End: 2023-06-29

## 2023-05-30 RX ORDER — DULAGLUTIDE 0.75 MG/.5ML
0.75 INJECTION, SOLUTION SUBCUTANEOUS
Qty: 3 | Refills: 2 | Status: DISCONTINUED | COMMUNITY
Start: 2020-04-27 | End: 2023-05-30

## 2023-05-30 RX ORDER — TIRZEPATIDE 2.5 MG/.5ML
2.5 INJECTION, SOLUTION SUBCUTANEOUS
Qty: 1 | Refills: 0 | Status: DISCONTINUED | COMMUNITY
Start: 2023-05-18 | End: 2023-05-30

## 2023-06-05 ENCOUNTER — RX RENEWAL (OUTPATIENT)
Age: 43
End: 2023-06-05

## 2023-06-07 ENCOUNTER — APPOINTMENT (OUTPATIENT)
Dept: PULMONOLOGY | Facility: CLINIC | Age: 43
End: 2023-06-07
Payer: COMMERCIAL

## 2023-06-07 VITALS
BODY MASS INDEX: 52.08 KG/M2 | HEART RATE: 108 BPM | HEIGHT: 62 IN | SYSTOLIC BLOOD PRESSURE: 132 MMHG | TEMPERATURE: 96 F | WEIGHT: 283 LBS | DIASTOLIC BLOOD PRESSURE: 86 MMHG | OXYGEN SATURATION: 96 %

## 2023-06-07 DIAGNOSIS — J45.909 UNSPECIFIED ASTHMA, UNCOMPLICATED: ICD-10-CM

## 2023-06-07 DIAGNOSIS — R06.83 SNORING: ICD-10-CM

## 2023-06-07 PROCEDURE — 94727 GAS DIL/WSHOT DETER LNG VOL: CPT

## 2023-06-07 PROCEDURE — 99205 OFFICE O/P NEW HI 60 MIN: CPT | Mod: 25,GC

## 2023-06-07 PROCEDURE — 94729 DIFFUSING CAPACITY: CPT

## 2023-06-07 PROCEDURE — 94010 BREATHING CAPACITY TEST: CPT

## 2023-06-07 NOTE — ASSESSMENT
[FreeTextEntry1] : Data reviewed: \par \par PFT 6/7/2023: mild-mod restriction, no obstruction, normal DLCO\par \par Impression: \par Snoring, r/o RADHA \par Stated hx of asthma\par Pre op risk stratification \par \par No evidence of obstruction on PFTs, asthma diagnosis is unclear. Can continue with PRN albuterol for now, no evidence of asthma exacerbation. \par HST to work up underlying RADHA. Pending results of this for pre op stratification - can follow after test is performed to see if she requires RADHA precautions with anesthesia +/- PAP therapy \par --\par Attending Addendum\par \par Seen and examined by me and agree w above. Pleasant woman w stated hx asthma, asymptomatic w no airflow obstruction today. Previous procedures uncomplicated; no hx VTE. HST to exclude RADHA. Do not anticipate barriers to bariatric surgery from pulmonary POV.

## 2023-06-07 NOTE — REVIEW OF SYSTEMS
[Arthralgias] : arthralgias [Obesity] : obesity [Negative] : Gastrointestinal [Fever] : no fever [Fatigue] : no fatigue [Chills] : no chills [Headache] : no headache [Depression] : no depression [Anxiety] : no anxiety

## 2023-06-07 NOTE — PHYSICAL EXAM
[No Acute Distress] : no acute distress [III] : Mallampati Class: III [Normal Appearance] : normal appearance [Normal Rate/Rhythm] : normal rate/rhythm [Normal S1, S2] : normal s1, s2 [No Murmurs] : no murmurs [No Resp Distress] : no resp distress [Clear to Auscultation Bilaterally] : clear to auscultation bilaterally [No Abnormalities] : no abnormalities [Benign] : benign [Normal Gait] : normal gait [No Clubbing] : no clubbing [No Edema] : no edema [Normal Color/ Pigmentation] : normal color/ pigmentation [Oriented x3] : oriented x3 [Normal Affect] : normal affect

## 2023-06-07 NOTE — HISTORY OF PRESENT ILLNESS
[Never] : never [TextBox_4] : 6/7/2023 [Jaydon]: Here for pre op prior to bariatric surgery. Previously with general anesthesia x2 which was tolerated well. History of Pre DM, obesity, asthma, and osteoarthritis. Asthma wise, uses inhaler very sparingly - last two years ago. Was diagnosed as chiled but never formally tested. Air quality recently has made throat a little irritated. Airways seem to be excited by cold. 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 Snores at night - noticed by bed partner. Used to have parasomnias. Never had sleep study done. Still pending surgery date. No history of VTE.  [ESS] : 2

## 2023-06-08 ENCOUNTER — NON-APPOINTMENT (OUTPATIENT)
Age: 43
End: 2023-06-08

## 2023-06-08 LAB
CREAT SPEC-SCNC: 304 MG/DL
MICROALBUMIN 24H UR DL<=1MG/L-MCNC: 2 MG/DL
MICROALBUMIN/CREAT 24H UR-RTO: 7 MG/G

## 2023-06-08 NOTE — PHYSICAL EXAM
[Alert] : alert [Healthy Appearance] : healthy appearance [No Acute Distress] : no acute distress [Normal Sclera/Conjunctiva] : normal sclera/conjunctiva [Normal Hearing] : hearing was normal [No Respiratory Distress] : no respiratory distress [No Stigmata of Cushings Syndrome] : no stigmata of Cushings Syndrome [Normal Gait] : normal gait [Normal Insight/Judgement] : insight and judgment were intact [Kyphosis] : no kyphosis present [de-identified] : no moon facies, no supraclavicular fat pads [de-identified] : Foot examination performed in January 2023

## 2023-06-08 NOTE — ASSESSMENT
[FreeTextEntry1] : Type 2 diabetes mellitus/elevated body mass index. Point-of-care HbA1c 6.0% and glucose 99 mg/dL today. No known history of micro- or macrovascular complications. We discussed the importance of diet and exercise and lifestyle modification for glycemic control and weight loss. We discussed pharmacologic options for glycemic control and weight loss. She is tolerating metformin and we will continue. She is amenable to a trial of a combined GLP-1/GIP receptor agonist. We discussed the risks and benefits of the GLP-1/GIP receptor agonist class, including but not limited to nausea, pancreatitis, medullary thyroid cancer. We reviewed subcutaneous injection technique, storage, and sharps disposal. She is pursuing surgical options for weight loss. I advised she can discontinue metformin and Mounjaro following bariatric surgery; we can consider reinitiation of Mounjaro if weight loss is insufficient after her procedure.\par Continue metformin ER 1000 mg twice daily\par Stop Trulicity and start Mounjaro 2.5 mg weekly for four weeks (samples given), then 5 mg weekly if covered by insurance\par She is not on a blood pressure regimen; blood pressure around goal\par She is not on a statin for cholesterol; LDL below 70 mg/dL\par Nephrology screening: Due\par Last ophthalmology appointment: February 2023; annual\par Last dental appointment: Every six months\par \par Thyroid nodules. She has been clinically and biochemically euthyroid. Thyroid ultrasound in June 2018 demonstrated a 2.0 x 1.3 x 1.5 cm left inferior pole nodule and a right 1.0 x 0.5 x 1.0 cm right inferior nodule without suspicious features. Fine needle aspiration of the left inferior nodule in June 2018 was benign ([place]). Thyroid ultrasound from January 2020 demonstrated the right lower pole now 1.1 x 0.6 x 0.9 cm, hypoechoic, with microcalcifications meeting criteria for biopsy. Thyroid ultrasound from August 2022 demonstrated subcentimeter nodules on the right and a left lower pole 1.4 x 1.1 x 2.0 cm hypoechoic nodule with microcalcifications. The left lower pole nodule was biopsied with cytopathology benign ([place]). We discussed that approximately 95 percent of all thyroid nodules are caused by benign conditions. \par Interval thyroid ultrasound in August 2023\par \par Polycystic ovarian syndrome. She was diagnosed with polycystic ovarian syndrome in the setting of oligomenorrhea, polycystic ovaries on imaging, and biochemical/clinical evidence of hyperandrogenism. Previous evaluation for other causes of oligomenorrhea was unrevealing. She has a Mirena intrauterine device in place.\par \par Hyperparathyroidism. She has presumed secondary hyperparathyroidism in the setting of low calcium intake. She has yogurt or cheese a few times a week. I advised calcium 500-600 mg daily. She has started a vitamin D supplement with 1000 intl units daily and I advised she continue. \par \par Return to see me in 3-4 months.

## 2023-06-08 NOTE — ADDENDUM
[FreeTextEntry1] : Recent urine microalbumin within the normal range as below. I left a telephone message for Ms. Wolf to discuss. 6/08/23

## 2023-06-15 ENCOUNTER — NON-APPOINTMENT (OUTPATIENT)
Age: 43
End: 2023-06-15

## 2023-06-30 ENCOUNTER — RX RENEWAL (OUTPATIENT)
Age: 43
End: 2023-06-30

## 2023-07-05 ENCOUNTER — APPOINTMENT (OUTPATIENT)
Dept: SLEEP CENTER | Facility: HOME HEALTH | Age: 43
End: 2023-07-05
Payer: COMMERCIAL

## 2023-07-05 ENCOUNTER — OUTPATIENT (OUTPATIENT)
Dept: OUTPATIENT SERVICES | Facility: HOSPITAL | Age: 43
LOS: 1 days | End: 2023-07-05
Payer: COMMERCIAL

## 2023-07-05 DIAGNOSIS — K08.409 PARTIAL LOSS OF TEETH, UNSPECIFIED CAUSE, UNSPECIFIED CLASS: Chronic | ICD-10-CM

## 2023-07-05 DIAGNOSIS — Z41.9 ENCOUNTER FOR PROCEDURE FOR PURPOSES OTHER THAN REMEDYING HEALTH STATE, UNSPECIFIED: Chronic | ICD-10-CM

## 2023-07-05 DIAGNOSIS — Z98.890 OTHER SPECIFIED POSTPROCEDURAL STATES: Chronic | ICD-10-CM

## 2023-07-05 PROCEDURE — 95800 SLP STDY UNATTENDED: CPT | Mod: 26

## 2023-07-05 PROCEDURE — 95800 SLP STDY UNATTENDED: CPT

## 2023-07-06 DIAGNOSIS — G47.33 OBSTRUCTIVE SLEEP APNEA (ADULT) (PEDIATRIC): ICD-10-CM

## 2023-07-09 ENCOUNTER — RX RENEWAL (OUTPATIENT)
Age: 43
End: 2023-07-09

## 2023-07-11 ENCOUNTER — RX RENEWAL (OUTPATIENT)
Age: 43
End: 2023-07-11

## 2023-07-12 ENCOUNTER — APPOINTMENT (OUTPATIENT)
Dept: PULMONOLOGY | Facility: CLINIC | Age: 43
End: 2023-07-12
Payer: COMMERCIAL

## 2023-07-12 ENCOUNTER — APPOINTMENT (OUTPATIENT)
Dept: PULMONOLOGY | Facility: CLINIC | Age: 43
End: 2023-07-12

## 2023-07-12 VITALS
BODY MASS INDEX: 52.08 KG/M2 | HEART RATE: 80 BPM | WEIGHT: 283 LBS | SYSTOLIC BLOOD PRESSURE: 120 MMHG | DIASTOLIC BLOOD PRESSURE: 66 MMHG | TEMPERATURE: 79.3 F | OXYGEN SATURATION: 95 % | HEIGHT: 62 IN

## 2023-07-12 PROCEDURE — 99213 OFFICE O/P EST LOW 20 MIN: CPT | Mod: 25

## 2023-07-17 NOTE — ASSESSMENT
[FreeTextEntry1] : Data reviewed: \par \par PFT 6/7/2023: mild-mod restriction, no obstruction, normal DLCO\par PFT 7/12/2023: mild restriction, FEV1 76%, TLC 75%, DLCO 79%\par \par Impression: \par Snoring, r/o RADHA \par Stated hx of asthma\par Pre op risk stratification \par \par Plan:\par No contraindication to bariatric surgery from pulmonary POV.\par Will await HST result.\par --\par HST [facility] 7/2023: severe RADHA, [place] 57%, 19% of time below 88%\par Need to treat. LM on VM and autoPAP ordered.\par Spoke to her re above on 7/17/23. See me one month after initiating use. Would get her on treatment prior to OR given the severity.

## 2023-07-17 NOTE — HISTORY OF PRESENT ILLNESS
[TextBox_4] : 6/7/2023 [Jaydon]: Here for pre op prior to bariatric surgery. Previously with general anesthesia x2 which was tolerated well. History of Pre DM, obesity, asthma, and osteoarthritis. Asthma wise, uses inhaler very sparingly - last two years ago. Was diagnosed as child but never formally tested. Air quality recently has made throat a little irritated. Airways seem to be excited by cold. Works as  at home - no exposures, but does have three cats. Snores at night - noticed by bed partner. Used to have parasomnias. Never had sleep study done. Still pending surgery date. No history of VTE. \par \par 7/12/2023: Returns in ongoing pre-op clearance for bariatrics - no new symptoms. Had sleep study within last week. Anxious to proceed with surgery as soon as feasible.

## 2023-08-01 ENCOUNTER — RX RENEWAL (OUTPATIENT)
Age: 43
End: 2023-08-01

## 2023-08-03 ENCOUNTER — APPOINTMENT (OUTPATIENT)
Dept: BARIATRICS | Facility: CLINIC | Age: 43
End: 2023-08-03
Payer: COMMERCIAL

## 2023-08-03 VITALS
WEIGHT: 282 LBS | TEMPERATURE: 97.2 F | BODY MASS INDEX: 51.89 KG/M2 | HEIGHT: 62 IN | OXYGEN SATURATION: 95 % | DIASTOLIC BLOOD PRESSURE: 62 MMHG | SYSTOLIC BLOOD PRESSURE: 115 MMHG | HEART RATE: 96 BPM

## 2023-08-03 PROCEDURE — 99213 OFFICE O/P EST LOW 20 MIN: CPT

## 2023-08-04 ENCOUNTER — NON-APPOINTMENT (OUTPATIENT)
Age: 43
End: 2023-08-04

## 2023-08-04 NOTE — ASSESSMENT
[FreeTextEntry1] : Pt is a 44 y/o F with pmhx of severe RADHA on CPAP, predm, morbid obesity BMI 51, who presents today feeling well here for final visit for bariatric sx. Pt has completed all of the requested preoperative testings and weigh ins which were all reviewed today. Pt states she received her CPAP machine 1 week ago and is following up with  at the end of this mo. Pt EGD revealed hiatal hernia and LA Grade A esophagitis and gastritis. Pt is interested in RYGB sx. Potential sx date for 8/23, pt to contact  to see if she can be seen sooner for f/u. We discussed at length surgical and non-surgical options and that non surgical approaches are unlikely to lead to long term, sustained weight loss. We also discussed that surgery alone is unlikely to be successful but should rather be seen as a tool for weight loss to be integrated with physical activity and nutritional counseling. The patient verbalized understanding and agrees to proceed with the evaluation. All risks of surgery were explained to the patient including the risks of leaks, infections, blood clots and death.

## 2023-08-04 NOTE — HISTORY OF PRESENT ILLNESS
[de-identified] : Pt is a 44 y/o F with pmhx of severe RADHA on CPAP, predm, morbid obesity BMI 51, who presents today feeling well here for final visit for bariatric sx. Pt has completed all of the requested preoperative testings and weigh ins which were all reviewed today. Pt states she received her CPAP machine 1 week ago and is following up with  at the end of this mo. Pt EGD revealed hiatal hernia and LA Grade A esophagitis and gastritis. Pt is interested in RYGB sx. Potential sx date for 8/23, pt to contact  to see if she can be seen sooner for f/u. We discussed at length surgical and non-surgical options and that non surgical approaches are unlikely to lead to long term, sustained weight loss. We also discussed that surgery alone is unlikely to be successful but should rather be seen as a tool for weight loss to be integrated with physical activity and nutritional counseling. The patient verbalized understanding and agrees to proceed with the evaluation. All risks of surgery were explained to the patient including the risks of leaks, infections, blood clots and death.

## 2023-08-04 NOTE — PLAN
[FreeTextEntry1] : pt to contact us for sx date for RYGB and HH repair, possibly 8/23- pending when f/u with  will be

## 2023-08-14 ENCOUNTER — APPOINTMENT (OUTPATIENT)
Dept: INTERNAL MEDICINE | Facility: CLINIC | Age: 43
End: 2023-08-14
Payer: COMMERCIAL

## 2023-08-14 ENCOUNTER — RESULT REVIEW (OUTPATIENT)
Age: 43
End: 2023-08-14

## 2023-08-14 ENCOUNTER — OUTPATIENT (OUTPATIENT)
Dept: OUTPATIENT SERVICES | Facility: HOSPITAL | Age: 43
LOS: 1 days | End: 2023-08-14
Payer: COMMERCIAL

## 2023-08-14 ENCOUNTER — NON-APPOINTMENT (OUTPATIENT)
Age: 43
End: 2023-08-14

## 2023-08-14 ENCOUNTER — LABORATORY RESULT (OUTPATIENT)
Age: 43
End: 2023-08-14

## 2023-08-14 VITALS
OXYGEN SATURATION: 96 % | RESPIRATION RATE: 15 BRPM | HEART RATE: 99 BPM | BODY MASS INDEX: 51.34 KG/M2 | TEMPERATURE: 97.3 F | HEIGHT: 62 IN | SYSTOLIC BLOOD PRESSURE: 132 MMHG | DIASTOLIC BLOOD PRESSURE: 83 MMHG | WEIGHT: 279 LBS

## 2023-08-14 VITALS
DIASTOLIC BLOOD PRESSURE: 83 MMHG | BODY MASS INDEX: 51.34 KG/M2 | HEIGHT: 62 IN | WEIGHT: 279 LBS | OXYGEN SATURATION: 96 % | HEART RATE: 99 BPM | TEMPERATURE: 97.3 F | SYSTOLIC BLOOD PRESSURE: 132 MMHG

## 2023-08-14 DIAGNOSIS — K08.409 PARTIAL LOSS OF TEETH, UNSPECIFIED CAUSE, UNSPECIFIED CLASS: Chronic | ICD-10-CM

## 2023-08-14 DIAGNOSIS — Z98.890 OTHER SPECIFIED POSTPROCEDURAL STATES: Chronic | ICD-10-CM

## 2023-08-14 DIAGNOSIS — Z41.9 ENCOUNTER FOR PROCEDURE FOR PURPOSES OTHER THAN REMEDYING HEALTH STATE, UNSPECIFIED: Chronic | ICD-10-CM

## 2023-08-14 PROCEDURE — 99214 OFFICE O/P EST MOD 30 MIN: CPT | Mod: 25

## 2023-08-14 PROCEDURE — 93000 ELECTROCARDIOGRAM COMPLETE: CPT

## 2023-08-14 PROCEDURE — 71046 X-RAY EXAM CHEST 2 VIEWS: CPT | Mod: 26

## 2023-08-14 PROCEDURE — 71046 X-RAY EXAM CHEST 2 VIEWS: CPT

## 2023-08-14 PROCEDURE — 36415 COLL VENOUS BLD VENIPUNCTURE: CPT

## 2023-08-14 NOTE — ASSESSMENT
[Patient Optimized for Surgery] : Patient optimized for surgery [No Further Testing Recommended] : no further testing recommended [Continue medications as is] : Continue current medications [As per surgery] : as per surgery [FreeTextEntry4] : Pt here for preoperative clearance. Pending normal labs, will be cleared to proceed. Continue all meds perioperatively.

## 2023-08-14 NOTE — HISTORY OF PRESENT ILLNESS
[No Pertinent Cardiac History] : no history of aortic stenosis, atrial fibrillation, coronary artery disease, recent myocardial infarction, or implantable device/pacemaker [Asthma] : asthma [COPD] : no COPD [Sleep Apnea] : sleep apnea [Smoker] : not a smoker [No Adverse Anesthesia Reaction] : no adverse anesthesia reaction in self or family member [Chronic Anticoagulation] : no chronic anticoagulation [Chronic Kidney Disease] : no chronic kidney disease [Diabetes] : diabetes [(Patient denies any chest pain, claudication, dyspnea on exertion, orthopnea, palpitations or syncope)] : Patient denies any chest pain, claudication, dyspnea on exertion, orthopnea, palpitations or syncope [Excellent (>10 METs)] : Excellent (>10 METs) [FreeTextEntry4] : Pt is a 42 y/o F with pmhx of severe RADHA on CPAP, HTN, PCOS, diabetes, morbid obesity (BMI 51), who presents today for preoperative clearance prior to bariatric sx (RYGB) next week. Pt using her CPAP machine as directed.

## 2023-08-14 NOTE — PHYSICAL EXAM
[No Acute Distress] : no acute distress [Well Nourished] : well nourished [Well Developed] : well developed [Well-Appearing] : well-appearing [Normal Sclera/Conjunctiva] : normal sclera/conjunctiva [PERRL] : pupils equal round and reactive to light [EOMI] : extraocular movements intact [Normal Outer Ear/Nose] : the outer ears and nose were normal in appearance [Normal Oropharynx] : the oropharynx was normal [No JVD] : no jugular venous distention [No Lymphadenopathy] : no lymphadenopathy [Supple] : supple [Thyroid Normal, No Nodules] : the thyroid was normal and there were no nodules present [No Respiratory Distress] : no respiratory distress  [No Accessory Muscle Use] : no accessory muscle use [Clear to Auscultation] : lungs were clear to auscultation bilaterally [Normal Rate] : normal rate  [Regular Rhythm] : with a regular rhythm [Normal S1, S2] : normal S1 and S2 [No Murmur] : no murmur heard [No Carotid Bruits] : no carotid bruits [No Abdominal Bruit] : a ~M bruit was not heard ~T in the abdomen [No Varicosities] : no varicosities [Pedal Pulses Present] : the pedal pulses are present [No Edema] : there was no peripheral edema [No Palpable Aorta] : no palpable aorta [No Extremity Clubbing/Cyanosis] : no extremity clubbing/cyanosis [Soft] : abdomen soft [Non Tender] : non-tender [Non-distended] : non-distended [No Masses] : no abdominal mass palpated [No HSM] : no HSM [Normal Bowel Sounds] : normal bowel sounds [Normal Posterior Cervical Nodes] : no posterior cervical lymphadenopathy [Normal Anterior Cervical Nodes] : no anterior cervical lymphadenopathy [No CVA Tenderness] : no CVA  tenderness [No Spinal Tenderness] : no spinal tenderness [No Joint Swelling] : no joint swelling [Grossly Normal Strength/Tone] : grossly normal strength/tone [No Rash] : no rash [Coordination Grossly Intact] : coordination grossly intact [No Focal Deficits] : no focal deficits [Normal Gait] : normal gait [Deep Tendon Reflexes (DTR)] : deep tendon reflexes were 2+ and symmetric [Normal Affect] : the affect was normal [Normal Insight/Judgement] : insight and judgment were intact [de-identified] : obese

## 2023-08-15 ENCOUNTER — APPOINTMENT (OUTPATIENT)
Dept: BARIATRICS | Facility: CLINIC | Age: 43
End: 2023-08-15

## 2023-08-15 ENCOUNTER — NON-APPOINTMENT (OUTPATIENT)
Age: 43
End: 2023-08-15

## 2023-08-15 LAB
ALBUMIN SERPL ELPH-MCNC: 4.5 G/DL
ALP BLD-CCNC: 43 U/L
ALT SERPL-CCNC: 26 U/L
ANION GAP SERPL CALC-SCNC: 18 MMOL/L
APTT BLD: 34.2 SEC
AST SERPL-CCNC: 21 U/L
BILIRUB SERPL-MCNC: 0.5 MG/DL
BUN SERPL-MCNC: 11 MG/DL
CALCIUM SERPL-MCNC: 10.4 MG/DL
CHLORIDE SERPL-SCNC: 100 MMOL/L
CO2 SERPL-SCNC: 22 MMOL/L
CREAT SERPL-MCNC: 0.67 MG/DL
EGFR: 111 ML/MIN/1.73M2
ESTIMATED AVERAGE GLUCOSE: 126 MG/DL
GLUCOSE SERPL-MCNC: 97 MG/DL
HBA1C MFR BLD HPLC: 6 %
INR PPP: 0.85 RATIO
POTASSIUM SERPL-SCNC: 4.6 MMOL/L
PROT SERPL-MCNC: 7 G/DL
PT BLD: 9.6 SEC
SODIUM SERPL-SCNC: 140 MMOL/L

## 2023-08-22 ENCOUNTER — TRANSCRIPTION ENCOUNTER (OUTPATIENT)
Age: 43
End: 2023-08-22

## 2023-08-23 ENCOUNTER — RESULT REVIEW (OUTPATIENT)
Age: 43
End: 2023-08-23

## 2023-08-23 ENCOUNTER — APPOINTMENT (OUTPATIENT)
Dept: BARIATRICS | Facility: HOSPITAL | Age: 43
End: 2023-08-23
Payer: COMMERCIAL

## 2023-08-23 ENCOUNTER — INPATIENT (INPATIENT)
Facility: HOSPITAL | Age: 43
LOS: 1 days | Discharge: HOME | DRG: 327 | End: 2023-08-25
Attending: GENERAL ACUTE CARE HOSPITAL | Admitting: GENERAL ACUTE CARE HOSPITAL
Payer: COMMERCIAL

## 2023-08-23 VITALS
OXYGEN SATURATION: 96 % | SYSTOLIC BLOOD PRESSURE: 132 MMHG | WEIGHT: 273.37 LBS | HEART RATE: 88 BPM | DIASTOLIC BLOOD PRESSURE: 83 MMHG | TEMPERATURE: 97 F | RESPIRATION RATE: 16 BRPM | HEIGHT: 62 IN

## 2023-08-23 DIAGNOSIS — Z98.890 OTHER SPECIFIED POSTPROCEDURAL STATES: Chronic | ICD-10-CM

## 2023-08-23 DIAGNOSIS — Z41.9 ENCOUNTER FOR PROCEDURE FOR PURPOSES OTHER THAN REMEDYING HEALTH STATE, UNSPECIFIED: Chronic | ICD-10-CM

## 2023-08-23 DIAGNOSIS — K08.409 PARTIAL LOSS OF TEETH, UNSPECIFIED CAUSE, UNSPECIFIED CLASS: Chronic | ICD-10-CM

## 2023-08-23 LAB
GLUCOSE BLDC GLUCOMTR-MCNC: 128 MG/DL — HIGH (ref 70–99)
GLUCOSE BLDC GLUCOMTR-MCNC: 140 MG/DL — HIGH (ref 70–99)
GLUCOSE BLDC GLUCOMTR-MCNC: 89 MG/DL — SIGNIFICANT CHANGE UP (ref 70–99)

## 2023-08-23 PROCEDURE — 88302 TISSUE EXAM BY PATHOLOGIST: CPT | Mod: 26

## 2023-08-23 PROCEDURE — 43282 LAP PARAESOPH HER RPR W/MESH: CPT | Mod: AS,59

## 2023-08-23 PROCEDURE — 43282 LAP PARAESOPH HER RPR W/MESH: CPT | Mod: 59

## 2023-08-23 PROCEDURE — 43645 LAP GASTR BYPASS INCL SMLL I: CPT | Mod: AS

## 2023-08-23 PROCEDURE — 43645 LAP GASTR BYPASS INCL SMLL I: CPT

## 2023-08-23 PROCEDURE — S2900 ROBOTIC SURGICAL SYSTEM: CPT | Mod: NC

## 2023-08-23 PROCEDURE — 43235 EGD DIAGNOSTIC BRUSH WASH: CPT | Mod: 59

## 2023-08-23 DEVICE — MESH PHASIX ST 7X10CM: Type: IMPLANTABLE DEVICE | Status: FUNCTIONAL

## 2023-08-23 DEVICE — XI STAPLER SUREFORM RELOAD 60 BLUE: Type: IMPLANTABLE DEVICE | Status: FUNCTIONAL

## 2023-08-23 DEVICE — XI STAPLER SUREFORM RELOAD 60 WHITE: Type: IMPLANTABLE DEVICE | Status: FUNCTIONAL

## 2023-08-23 RX ORDER — ACETAMINOPHEN 500 MG
1000 TABLET ORAL ONCE
Refills: 0 | Status: COMPLETED | OUTPATIENT
Start: 2023-08-23 | End: 2023-08-23

## 2023-08-23 RX ORDER — ACETAMINOPHEN 500 MG
1000 TABLET ORAL EVERY 6 HOURS
Refills: 0 | Status: DISCONTINUED | OUTPATIENT
Start: 2023-08-23 | End: 2023-08-25

## 2023-08-23 RX ORDER — SODIUM CHLORIDE 9 MG/ML
1000 INJECTION, SOLUTION INTRAVENOUS
Refills: 0 | Status: DISCONTINUED | OUTPATIENT
Start: 2023-08-23 | End: 2023-08-24

## 2023-08-23 RX ORDER — SCOPALAMINE 1 MG/3D
1 PATCH, EXTENDED RELEASE TRANSDERMAL ONCE
Refills: 0 | Status: COMPLETED | OUTPATIENT
Start: 2023-08-23 | End: 2023-08-23

## 2023-08-23 RX ORDER — OXYCODONE HYDROCHLORIDE 5 MG/1
5 TABLET ORAL EVERY 6 HOURS
Refills: 0 | Status: DISCONTINUED | OUTPATIENT
Start: 2023-08-23 | End: 2023-08-25

## 2023-08-23 RX ORDER — DEXTROSE 50 % IN WATER 50 %
15 SYRINGE (ML) INTRAVENOUS ONCE
Refills: 0 | Status: DISCONTINUED | OUTPATIENT
Start: 2023-08-23 | End: 2023-08-25

## 2023-08-23 RX ORDER — DEXTROSE 50 % IN WATER 50 %
25 SYRINGE (ML) INTRAVENOUS ONCE
Refills: 0 | Status: DISCONTINUED | OUTPATIENT
Start: 2023-08-23 | End: 2023-08-25

## 2023-08-23 RX ORDER — ONDANSETRON 8 MG/1
4 TABLET, FILM COATED ORAL EVERY 6 HOURS
Refills: 0 | Status: DISCONTINUED | OUTPATIENT
Start: 2023-08-23 | End: 2023-08-25

## 2023-08-23 RX ORDER — DEXTROSE 50 % IN WATER 50 %
12.5 SYRINGE (ML) INTRAVENOUS ONCE
Refills: 0 | Status: DISCONTINUED | OUTPATIENT
Start: 2023-08-23 | End: 2023-08-25

## 2023-08-23 RX ORDER — SODIUM CHLORIDE 9 MG/ML
1000 INJECTION, SOLUTION INTRAVENOUS
Refills: 0 | Status: DISCONTINUED | OUTPATIENT
Start: 2023-08-23 | End: 2023-08-25

## 2023-08-23 RX ORDER — THIAMINE MONONITRATE (VIT B1) 100 MG
500 TABLET ORAL EVERY 24 HOURS
Refills: 0 | Status: COMPLETED | OUTPATIENT
Start: 2023-08-23 | End: 2023-08-25

## 2023-08-23 RX ORDER — PANTOPRAZOLE SODIUM 20 MG/1
40 TABLET, DELAYED RELEASE ORAL DAILY
Refills: 0 | Status: DISCONTINUED | OUTPATIENT
Start: 2023-08-23 | End: 2023-08-25

## 2023-08-23 RX ORDER — INSULIN LISPRO 100/ML
VIAL (ML) SUBCUTANEOUS
Refills: 0 | Status: DISCONTINUED | OUTPATIENT
Start: 2023-08-23 | End: 2023-08-25

## 2023-08-23 RX ORDER — HYDROMORPHONE HYDROCHLORIDE 2 MG/ML
0.25 INJECTION INTRAMUSCULAR; INTRAVENOUS; SUBCUTANEOUS
Refills: 0 | Status: DISCONTINUED | OUTPATIENT
Start: 2023-08-23 | End: 2023-08-23

## 2023-08-23 RX ORDER — GLUCAGON INJECTION, SOLUTION 0.5 MG/.1ML
1 INJECTION, SOLUTION SUBCUTANEOUS ONCE
Refills: 0 | Status: DISCONTINUED | OUTPATIENT
Start: 2023-08-23 | End: 2023-08-25

## 2023-08-23 RX ORDER — HYDROMORPHONE HYDROCHLORIDE 2 MG/ML
0.5 INJECTION INTRAMUSCULAR; INTRAVENOUS; SUBCUTANEOUS
Refills: 0 | Status: DISCONTINUED | OUTPATIENT
Start: 2023-08-23 | End: 2023-08-23

## 2023-08-23 RX ORDER — ENOXAPARIN SODIUM 100 MG/ML
40 INJECTION SUBCUTANEOUS ONCE
Refills: 0 | Status: COMPLETED | OUTPATIENT
Start: 2023-08-23 | End: 2023-08-23

## 2023-08-23 RX ADMIN — HYDROMORPHONE HYDROCHLORIDE 0.5 MILLIGRAM(S): 2 INJECTION INTRAMUSCULAR; INTRAVENOUS; SUBCUTANEOUS at 19:13

## 2023-08-23 RX ADMIN — Medication 1000 MILLIGRAM(S): at 14:24

## 2023-08-23 RX ADMIN — ENOXAPARIN SODIUM 40 MILLIGRAM(S): 100 INJECTION SUBCUTANEOUS at 14:24

## 2023-08-23 RX ADMIN — SCOPALAMINE 1 PATCH: 1 PATCH, EXTENDED RELEASE TRANSDERMAL at 14:24

## 2023-08-23 RX ADMIN — HYDROMORPHONE HYDROCHLORIDE 0.5 MILLIGRAM(S): 2 INJECTION INTRAMUSCULAR; INTRAVENOUS; SUBCUTANEOUS at 19:27

## 2023-08-23 RX ADMIN — OXYCODONE HYDROCHLORIDE 5 MILLIGRAM(S): 5 TABLET ORAL at 21:41

## 2023-08-23 RX ADMIN — OXYCODONE HYDROCHLORIDE 5 MILLIGRAM(S): 5 TABLET ORAL at 22:29

## 2023-08-23 RX ADMIN — HYDROMORPHONE HYDROCHLORIDE 0.5 MILLIGRAM(S): 2 INJECTION INTRAMUSCULAR; INTRAVENOUS; SUBCUTANEOUS at 19:56

## 2023-08-23 NOTE — H&P ADULT - NSHPPHYSICALEXAM_GEN_ALL_CORE
Awake, alert, calm  Mood consistent with affect  Hearing grossly intact  Vision grossly intact  Neck supple  HR regular  breathing unlabored  abdomen soft, nontender  BLE soft, non-edematous, non-tender

## 2023-08-23 NOTE — BRIEF OPERATIVE NOTE - NSICDXBRIEFPOSTOP_GEN_ALL_CORE_FT
POST-OP DIAGNOSIS:  Obesity, morbid 23-Aug-2023 19:14:11  Solomon Man  RADHA on CPAP 23-Aug-2023 19:14:02  Solomon Man

## 2023-08-23 NOTE — H&P ADULT - HISTORY OF PRESENT ILLNESS
Presenting for elective Annamarie en Y Gastric Bypass and hiatal hernia repair    From note by Jacques Perry 8/4/23:    Pt is a 42 y/o F with pmhx of severe RADHA on CPAP, predm, morbid obesity BMI 51, who presents today feeling well here for final visit for bariatric sx. Pt has completed all of the requested preoperative testings and weigh ins which were all reviewed today. Pt states she received her CPAP machine 1 week ago and is following up with  at the end of this mo. Pt EGD revealed hiatal hernia and LA Grade A esophagitis and gastritis. Pt is interested in RYGB sx...

## 2023-08-23 NOTE — H&P ADULT - NSICDXPASTMEDICALHX_GEN_ALL_CORE_FT
PAST MEDICAL HISTORY:  Anxiety     Arthritis     Asthma     Astigmatism BILAT    Depression     Diaphragmatic hernia     DM (diabetes mellitus)     Elevated dehydroepiandrosterone (DHEA) level     Endometrial polyp     Fatty liver     H/O hyperparathyroidism     H/O: depression     History of snoring     HTN (hypertension)     Obesity     PCOS (polycystic ovarian syndrome)     Sciatica RIGHT NUMBNESS    Thyromegaly     TMJ arthralgia

## 2023-08-23 NOTE — H&P ADULT - NSICDXPASTSURGICALHX_GEN_ALL_CORE_FT
PAST SURGICAL HISTORY:  H/O bilateral breast biopsy BENIGN    H/O breast biopsy RIGHT    History of surgery THYROID BIOPSY- ENLARGED    Surgery, elective endometrial polyp removal    [place] teeth extracted

## 2023-08-24 LAB
A1C WITH ESTIMATED AVERAGE GLUCOSE RESULT: 6 % — HIGH (ref 4–5.6)
ANION GAP SERPL CALC-SCNC: 9 MMOL/L — SIGNIFICANT CHANGE UP (ref 5–17)
BASOPHILS # BLD AUTO: 0.03 K/UL — SIGNIFICANT CHANGE UP (ref 0–0.2)
BASOPHILS NFR BLD AUTO: 0.3 % — SIGNIFICANT CHANGE UP (ref 0–2)
BUN SERPL-MCNC: 9 MG/DL — SIGNIFICANT CHANGE UP (ref 7–23)
CALCIUM SERPL-MCNC: 8.3 MG/DL — LOW (ref 8.4–10.5)
CHLORIDE SERPL-SCNC: 102 MMOL/L — SIGNIFICANT CHANGE UP (ref 96–108)
CO2 SERPL-SCNC: 25 MMOL/L — SIGNIFICANT CHANGE UP (ref 22–31)
CREAT SERPL-MCNC: 0.64 MG/DL — SIGNIFICANT CHANGE UP (ref 0.5–1.3)
EGFR: 112 ML/MIN/1.73M2 — SIGNIFICANT CHANGE UP
EOSINOPHIL # BLD AUTO: 0.01 K/UL — SIGNIFICANT CHANGE UP (ref 0–0.5)
EOSINOPHIL NFR BLD AUTO: 0.1 % — SIGNIFICANT CHANGE UP (ref 0–6)
ESTIMATED AVERAGE GLUCOSE: 126 MG/DL — HIGH (ref 68–114)
GLUCOSE BLDC GLUCOMTR-MCNC: 103 MG/DL — HIGH (ref 70–99)
GLUCOSE BLDC GLUCOMTR-MCNC: 105 MG/DL — HIGH (ref 70–99)
GLUCOSE BLDC GLUCOMTR-MCNC: 107 MG/DL — HIGH (ref 70–99)
GLUCOSE BLDC GLUCOMTR-MCNC: 94 MG/DL — SIGNIFICANT CHANGE UP (ref 70–99)
GLUCOSE SERPL-MCNC: 104 MG/DL — HIGH (ref 70–99)
HCT VFR BLD CALC: 39.3 % — SIGNIFICANT CHANGE UP (ref 34.5–45)
HCT VFR BLD CALC: 40.1 % — SIGNIFICANT CHANGE UP (ref 34.5–45)
HGB BLD-MCNC: 12.6 G/DL — SIGNIFICANT CHANGE UP (ref 11.5–15.5)
HGB BLD-MCNC: 13.2 G/DL — SIGNIFICANT CHANGE UP (ref 11.5–15.5)
IMM GRANULOCYTES NFR BLD AUTO: 0.4 % — SIGNIFICANT CHANGE UP (ref 0–0.9)
LYMPHOCYTES # BLD AUTO: 1.29 K/UL — SIGNIFICANT CHANGE UP (ref 1–3.3)
LYMPHOCYTES # BLD AUTO: 12.3 % — LOW (ref 13–44)
MAGNESIUM SERPL-MCNC: 1.9 MG/DL — SIGNIFICANT CHANGE UP (ref 1.6–2.6)
MCHC RBC-ENTMCNC: 29.2 PG — SIGNIFICANT CHANGE UP (ref 27–34)
MCHC RBC-ENTMCNC: 29.3 PG — SIGNIFICANT CHANGE UP (ref 27–34)
MCHC RBC-ENTMCNC: 32.1 GM/DL — SIGNIFICANT CHANGE UP (ref 32–36)
MCHC RBC-ENTMCNC: 32.9 GM/DL — SIGNIFICANT CHANGE UP (ref 32–36)
MCV RBC AUTO: 89.1 FL — SIGNIFICANT CHANGE UP (ref 80–100)
MCV RBC AUTO: 91 FL — SIGNIFICANT CHANGE UP (ref 80–100)
MONOCYTES # BLD AUTO: 0.76 K/UL — SIGNIFICANT CHANGE UP (ref 0–0.9)
MONOCYTES NFR BLD AUTO: 7.2 % — SIGNIFICANT CHANGE UP (ref 2–14)
NEUTROPHILS # BLD AUTO: 8.39 K/UL — HIGH (ref 1.8–7.4)
NEUTROPHILS NFR BLD AUTO: 79.7 % — HIGH (ref 43–77)
NRBC # BLD: 0 /100 WBCS — SIGNIFICANT CHANGE UP (ref 0–0)
NRBC # BLD: 0 /100 WBCS — SIGNIFICANT CHANGE UP (ref 0–0)
PHOSPHATE SERPL-MCNC: 3.5 MG/DL — SIGNIFICANT CHANGE UP (ref 2.5–4.5)
PLATELET # BLD AUTO: 305 K/UL — SIGNIFICANT CHANGE UP (ref 150–400)
PLATELET # BLD AUTO: 316 K/UL — SIGNIFICANT CHANGE UP (ref 150–400)
POTASSIUM SERPL-MCNC: 3.7 MMOL/L — SIGNIFICANT CHANGE UP (ref 3.5–5.3)
POTASSIUM SERPL-SCNC: 3.7 MMOL/L — SIGNIFICANT CHANGE UP (ref 3.5–5.3)
RBC # BLD: 4.32 M/UL — SIGNIFICANT CHANGE UP (ref 3.8–5.2)
RBC # BLD: 4.5 M/UL — SIGNIFICANT CHANGE UP (ref 3.8–5.2)
RBC # FLD: 13.4 % — SIGNIFICANT CHANGE UP (ref 10.3–14.5)
RBC # FLD: 13.5 % — SIGNIFICANT CHANGE UP (ref 10.3–14.5)
SODIUM SERPL-SCNC: 136 MMOL/L — SIGNIFICANT CHANGE UP (ref 135–145)
WBC # BLD: 10.52 K/UL — HIGH (ref 3.8–10.5)
WBC # BLD: 15.33 K/UL — HIGH (ref 3.8–10.5)
WBC # FLD AUTO: 10.52 K/UL — HIGH (ref 3.8–10.5)
WBC # FLD AUTO: 15.33 K/UL — HIGH (ref 3.8–10.5)

## 2023-08-24 RX ORDER — ENOXAPARIN SODIUM 100 MG/ML
40 INJECTION SUBCUTANEOUS EVERY 12 HOURS
Refills: 0 | Status: DISCONTINUED | OUTPATIENT
Start: 2023-08-24 | End: 2023-08-25

## 2023-08-24 RX ORDER — SODIUM CHLORIDE 9 MG/ML
1000 INJECTION, SOLUTION INTRAVENOUS
Refills: 0 | Status: DISCONTINUED | OUTPATIENT
Start: 2023-08-24 | End: 2023-08-25

## 2023-08-24 RX ORDER — MAGNESIUM SULFATE 500 MG/ML
1 VIAL (ML) INJECTION ONCE
Refills: 0 | Status: COMPLETED | OUTPATIENT
Start: 2023-08-24 | End: 2023-08-24

## 2023-08-24 RX ORDER — ENOXAPARIN SODIUM 100 MG/ML
40 INJECTION SUBCUTANEOUS EVERY 12 HOURS
Refills: 0 | Status: DISCONTINUED | OUTPATIENT
Start: 2023-08-24 | End: 2023-08-24

## 2023-08-24 RX ORDER — POTASSIUM CHLORIDE 20 MEQ
10 PACKET (EA) ORAL EVERY 4 HOURS
Refills: 0 | Status: COMPLETED | OUTPATIENT
Start: 2023-08-24 | End: 2023-08-24

## 2023-08-24 RX ADMIN — SCOPALAMINE 1 PATCH: 1 PATCH, EXTENDED RELEASE TRANSDERMAL at 06:23

## 2023-08-24 RX ADMIN — Medication 1000 MILLIGRAM(S): at 00:26

## 2023-08-24 RX ADMIN — Medication 1000 MILLIGRAM(S): at 18:14

## 2023-08-24 RX ADMIN — Medication 100 MILLIEQUIVALENT(S): at 13:40

## 2023-08-24 RX ADMIN — ENOXAPARIN SODIUM 40 MILLIGRAM(S): 100 INJECTION SUBCUTANEOUS at 22:23

## 2023-08-24 RX ADMIN — Medication 1000 MILLIGRAM(S): at 05:45

## 2023-08-24 RX ADMIN — Medication 100 GRAM(S): at 07:21

## 2023-08-24 RX ADMIN — ENOXAPARIN SODIUM 40 MILLIGRAM(S): 100 INJECTION SUBCUTANEOUS at 10:17

## 2023-08-24 RX ADMIN — SODIUM CHLORIDE 95 MILLILITER(S): 9 INJECTION, SOLUTION INTRAVENOUS at 13:40

## 2023-08-24 RX ADMIN — Medication 1000 MILLIGRAM(S): at 13:00

## 2023-08-24 RX ADMIN — SCOPALAMINE 1 PATCH: 1 PATCH, EXTENDED RELEASE TRANSDERMAL at 19:10

## 2023-08-24 RX ADMIN — Medication 100 MILLIEQUIVALENT(S): at 10:33

## 2023-08-24 RX ADMIN — Medication 105 MILLIGRAM(S): at 00:29

## 2023-08-24 RX ADMIN — PANTOPRAZOLE SODIUM 40 MILLIGRAM(S): 20 TABLET, DELAYED RELEASE ORAL at 13:32

## 2023-08-24 NOTE — DIETITIAN INITIAL EVALUATION ADULT - OTHER INFO
43 F with pmhx of severe RADHA on CPAP, asthma, DM,  morbid obesity BMI 51, small hiatal hernia and pshx of endometrial polypectomy now s/p RA lap RYGB and HH repair (8/23)    Visited pt at bedside this am on 9 WO. On assessment, pt resting in bed. Reports rigorously following the pre-op diet protocol for RYGB. Currently on BARICLLIQ diet however pt reports not receiving breakfast this am.  Discussed volumes of various cup sizes on tray table and encouraged aiming for 4 oz/hr as tolerated. Prepared with protein shakes, with plan to get vitamins after discharge. RD provided indepth edu on diet advancement and specific nutrient needs s/p RYGB (see below). Denies nvdc. No overt fat/muscle wasting noted; appearance consistent with BMI 50.0 (morbid obesity status).  Pt's wt on admission was 273.6lbs, pt's IBW is 110lbs; IBW to be utilized for nutrient calculations. Skin with surgical incision to abdomen; no PUs documented; no edema noted; tamara scale 18. Nutrition related labs reviewed; FSBG 105, 128, 140; HgA1c 6.0. Endorses mild pain at abdomen at time of assessment. Please view full recs below. RD to remain available

## 2023-08-24 NOTE — DIETITIAN INITIAL EVALUATION ADULT - PERTINENT MEDS FT
MEDICATIONS  (STANDING):  acetaminophen   Oral Liquid .. 1000 milliGRAM(s) Oral every 6 hours  dextrose 5%. 1000 milliLiter(s) (100 mL/Hr) IV Continuous <Continuous>  dextrose 5%. 1000 milliLiter(s) (50 mL/Hr) IV Continuous <Continuous>  dextrose 50% Injectable 25 Gram(s) IV Push once  dextrose 50% Injectable 25 Gram(s) IV Push once  dextrose 50% Injectable 12.5 Gram(s) IV Push once  enoxaparin Injectable 40 milliGRAM(s) SubCutaneous every 12 hours  glucagon  Injectable 1 milliGRAM(s) IntraMuscular once  insulin lispro (ADMELOG) corrective regimen sliding scale   SubCutaneous Before meals and at bedtime  lactated ringers. 1000 milliLiter(s) (95 mL/Hr) IV Continuous <Continuous>  pantoprazole  Injectable 40 milliGRAM(s) IV Push daily  potassium chloride  10 mEq/100 mL IVPB 10 milliEquivalent(s) IV Intermittent every 4 hours  thiamine IVPB 500 milliGRAM(s) IV Intermittent every 24 hours    MEDICATIONS  (PRN):  dextrose Oral Gel 15 Gram(s) Oral once PRN Blood Glucose LESS THAN 70 milliGRAM(s)/deciliter  ondansetron Injectable 4 milliGRAM(s) IV Push every 6 hours PRN Nausea and/or Vomiting  oxyCODONE    Solution 5 milliGRAM(s) Oral every 6 hours PRN Severe Pain (7 - 10)

## 2023-08-24 NOTE — PROGRESS NOTE ADULT - ASSESSMENT
43 F with pmhx of severe RADHA on CPAP, asthma, DM,  morbid obesity BMI 51, small hiatal hernia and pshx of endometrial polypectomy now s/p RA lap RYGB and HH repair (8/23)    CC BCLD/IVF  pain/nausea control  ISS  Protonix   OOBA/SCD/IS  Lovenox POD1  Nutrition consult  Am labs  CPAP 43 F with pmhx of severe RADHA on CPAP, asthma, DM,  morbid obesity BMI 51, small hiatal hernia and pshx of endometrial polypectomy now s/p RA lap RYGB and HH repair (8/23).     CC BCLD/IVF  pain/nausea control  ISS  Protonix   OOBA/SCD/IS  Lovenox POD1  Nutrition consult  Am labs  CPAP

## 2023-08-24 NOTE — DIETITIAN INITIAL EVALUATION ADULT - OTHER CALCULATIONS
Ideal body weight used to calculate energy needs due to pt's current body weight > 120% ideal body weight. Adjusted for status post RYGB

## 2023-08-24 NOTE — DIETITIAN INITIAL EVALUATION ADULT - PERTINENT LABORATORY DATA
08-24    136  |  102  |  9   ----------------------------<  104<H>  3.7   |  25  |  0.64    Ca    8.3<L>      24 Aug 2023 05:30  Phos  3.5     08-24  Mg     1.9     08-24    POCT Blood Glucose.: 105 mg/dL (08-24-23 @ 07:14)  A1C with Estimated Average Glucose Result: 6.0 % (08-24-23 @ 05:30)

## 2023-08-24 NOTE — PROGRESS NOTE ADULT - SUBJECTIVE AND OBJECTIVE BOX
Team 1 Surgery Post-Op Note    Pre-Op Dx:     Procedure: Laparoscopic gastric bypass    Repair, hernia, hiatal, robot-assisted      Surgeon:    Subjective:      Vital Signs Last 24 Hrs  T(C): 37.1 (23 Aug 2023 21:20), Max: 37.1 (23 Aug 2023 21:20)  T(F): 98.7 (23 Aug 2023 21:20), Max: 98.7 (23 Aug 2023 21:20)  HR: 71 (23 Aug 2023 23:39) (71 - 88)  BP: 140/89 (23 Aug 2023 21:20) (132/83 - 192/80)  BP(mean): 105 (23 Aug 2023 20:55) (94 - 118)  RR: 15 (23 Aug 2023 23:39) (15 - 29)  SpO2: 100% (23 Aug 2023 23:39) (94% - 100%)    Parameters below as of 23 Aug 2023 23:39  Patient On (Oxygen Delivery Method): BiPAP/CPAP    O2 Concentration (%): 40    Physical Exam:  General: NAD, resting comfortably in bed  Pulmonary: Nonlabored breathing, no respiratory distress  Cardiovascular: NSR  Abdominal: soft, NT/ND  Extremities: WWP, normal strength  Neuro: A/O x 3, CNs II-XII grossly intact, no focal deficits, normal sensation  Pulses: palpable distal pulses      LABS:            CAPILLARY BLOOD GLUCOSE      POCT Blood Glucose.: 128 mg/dL (23 Aug 2023 22:14)  POCT Blood Glucose.: 140 mg/dL (23 Aug 2023 19:05)  POCT Blood Glucose.: 89 mg/dL (23 Aug 2023 14:10)        ABO Interpretation: B (08-23 @ 14:23)        Radiology and Additional Studies:    Assessment:43y Female s/p above procedure    Plan:  Pain/nausea control PRN  Home meds  Incentive spirometer/OOB/Ambulate  IVF, Diet:  AM labs  ToV? Walker? Team 2 Surgery Post-Op Note    Pre-Op Dx: morbid obesity    Procedure: Laparoscopic gastric bypass    Repair, hernia, hiatal, robot-assisted    Surgeon: Dr. Perry    Subjective:  Patient seen and examined on telemetry floor after surgery. She is feeling ok. She reports cramping abdominal pain. Denies nausea, vomiting, chest pain, and shortness of breath. Has not had anything to drink after surgery. She has not voided yet since surgery. Water brought bedside and encouraged patient to take it slow with drinking.     Vital Signs Last 24 Hrs  T(C): 37.1 (23 Aug 2023 21:20), Max: 37.1 (23 Aug 2023 21:20)  T(F): 98.7 (23 Aug 2023 21:20), Max: 98.7 (23 Aug 2023 21:20)  HR: 71 (23 Aug 2023 23:39) (71 - 88)  BP: 140/89 (23 Aug 2023 21:20) (132/83 - 192/80)  BP(mean): 105 (23 Aug 2023 20:55) (94 - 118)  RR: 15 (23 Aug 2023 23:39) (15 - 29)  SpO2: 100% (23 Aug 2023 23:39) (94% - 100%)    Parameters below as of 23 Aug 2023 23:39  Patient On (Oxygen Delivery Method): BiPAP/CPAP    O2 Concentration (%): 40    Physical Exam:  General: NAD, resting comfortably in bed  Pulmonary: Nonlabored breathing, no respiratory distress with CPAP machine   Cardiovascular: NSR  Abdominal: soft, epigastric and LUQ tenderness, nondistended. incisions are clean, dry, and intact   Extremities: WWP, normal strength    CAPILLARY BLOOD GLUCOSE      POCT Blood Glucose.: 128 mg/dL (23 Aug 2023 22:14)  POCT Blood Glucose.: 140 mg/dL (23 Aug 2023 19:05)  POCT Blood Glucose.: 89 mg/dL (23 Aug 2023 14:10)      ABO Interpretation: B (08-23 @ 14:23)

## 2023-08-24 NOTE — DIETITIAN INITIAL EVALUATION ADULT - NS FNS DIET ORDER
Diet, Clear Liquid:   Consistent Carbohydrate {Evening Snack} (CSTCHOSN)  Bariatric Clear Liquid (BARICLLIQ) (08-23-23 @ 15:31)

## 2023-08-24 NOTE — DIETITIAN INITIAL EVALUATION ADULT - NUTRITIONGOAL OUTCOME1
- Pt to recall 2 main concepts from education (protein needs, fluid needs, vitamin and mineral needs)   - shows good po tolerance

## 2023-08-24 NOTE — PROGRESS NOTE ADULT - ASSESSMENT
43 F with pmhx of severe RADHA on CPAP, asthma, DM,  morbid obesity BMI 51, small hiatal hernia and pshx of endometrial polypectomy now s/p RA lap RYGB and HH repair (8/23)    CC BCLD/IVF  pain/nausea control  ISS  Protonix   OOBA/SCD/IS  Lovenox POD1  Nutrition consult  Am labs  CPAP  Replete electrolytes

## 2023-08-24 NOTE — DIETITIAN INITIAL EVALUATION ADULT - ADD RECOMMEND
1. Continue with current diet order (BARICLLIQ)  > diet progression per bariatric protocol/pt's tolerance   2. Monitor po intake/tolerance closely  3. ensure rigorous adherence to diet regimen   4. Monitor GI fxn, labs, skin integrity, wts   > continue insulin regimen per team; FSBG q4-6hrs   > continue thiamine per team   5. pain and bowel regimen per team   6. RD to remain available

## 2023-08-24 NOTE — PROGRESS NOTE ADULT - SUBJECTIVE AND OBJECTIVE BOX
INTERVAL HPI/OVERNIGHT EVENTS: midnight h/h 13.2/40, post-op check within normal limits. passed trial of void.     STATUS POST:  RA lap RYGB and HH repair     POST OPERATIVE DAY #: 1    SUBJECTIVE: Patient seen and examined at bedside with chief resident. Patient states that she is feeling well, her pain is well controlled, and she has started drinking minimal water. She tolerated her CPAP well overnight.      enoxaparin Injectable 40 milliGRAM(s) SubCutaneous every 12 hours      Vital Signs Last 24 Hrs  T(C): 36.3 (24 Aug 2023 05:10), Max: 37.1 (23 Aug 2023 21:20)  T(F): 97.4 (24 Aug 2023 05:10), Max: 98.7 (23 Aug 2023 21:20)  HR: 76 (24 Aug 2023 06:08) (71 - 89)  BP: 144/77 (24 Aug 2023 05:10) (132/83 - 192/80)  BP(mean): 105 (23 Aug 2023 20:55) (94 - 118)  RR: 15 (24 Aug 2023 06:08) (15 - 29)  SpO2: 100% (24 Aug 2023 06:08) (94% - 100%)    Parameters below as of 24 Aug 2023 06:08  Patient On (Oxygen Delivery Method): BiPAP/CPAP    O2 Concentration (%): 40  I&O's Detail    23 Aug 2023 07:01  -  24 Aug 2023 07:00  --------------------------------------------------------  IN:    Lactated Ringers: 1320 mL    Oral Fluid: 210 mL  Total IN: 1530 mL    OUT:    Voided (mL): 350 mL  Total OUT: 350 mL    Total NET: 1180 mL          General: NAD, resting comfortably in bed  C/V: NSR  Pulm: Nonlabored breathing, no respiratory distress  Abd: appropriate ttp at incisions, soft, ND.  Extrem: WWP, no edema, SCDs in place      LABS:                        12.6   10.52 )-----------( 305      ( 24 Aug 2023 05:30 )             39.3     08-24    136  |  102  |  9   ----------------------------<  104<H>  3.7   |  25  |  0.64    Ca    8.3<L>      24 Aug 2023 05:30  Phos  3.5     08-24  Mg     1.9     08-24        Urinalysis Basic - ( 24 Aug 2023 05:30 )    Color: x / Appearance: x / SG: x / pH: x  Gluc: 104 mg/dL / Ketone: x  / Bili: x / Urobili: x   Blood: x / Protein: x / Nitrite: x   Leuk Esterase: x / RBC: x / WBC x   Sq Epi: x / Non Sq Epi: x / Bacteria: x

## 2023-08-24 NOTE — DIETITIAN INITIAL EVALUATION ADULT - NSICDXPASTSURGICALHX_GEN_ALL_CORE_FT
PAST SURGICAL HISTORY:  H/O bilateral breast biopsy BENIGN    H/O breast biopsy RIGHT    History of surgery THYROID BIOPSY- ENLARGED    Surgery, elective endometrial polyp removal    [place] teeth extracted

## 2023-08-24 NOTE — DIETITIAN INITIAL EVALUATION ADULT - LITERATURE/VIDEOS GIVEN
RD Discussed volumes of various cup sizes on tray table and encouraged aiming for 4 oz/hr as tolerated. Pt is prepared with protein shakes, with plan to get vitamins after discharge. RD provided in depth edu on diet advancement and specific nutrient needs status post RYGB. Pt appears receptive, verbalized understanding. Written nutrition education handouts provided.

## 2023-08-24 NOTE — DIETITIAN NUTRITION RISK NOTIFICATION - TREATMENT: THE FOLLOWING DIET HAS BEEN RECOMMENDED
Diet, Clear Liquid:   Consistent Carbohydrate {Evening Snack} (CSTCHOSN)  Bariatric Clear Liquid (BARICLLIQ) (08-23-23 @ 15:31) [Active]

## 2023-08-25 ENCOUNTER — TRANSCRIPTION ENCOUNTER (OUTPATIENT)
Age: 43
End: 2023-08-25

## 2023-08-25 VITALS
TEMPERATURE: 98 F | SYSTOLIC BLOOD PRESSURE: 126 MMHG | OXYGEN SATURATION: 93 % | RESPIRATION RATE: 16 BRPM | HEART RATE: 80 BPM | DIASTOLIC BLOOD PRESSURE: 80 MMHG

## 2023-08-25 LAB
ANION GAP SERPL CALC-SCNC: 8 MMOL/L — SIGNIFICANT CHANGE UP (ref 5–17)
BUN SERPL-MCNC: 5 MG/DL — LOW (ref 7–23)
CALCIUM SERPL-MCNC: 8.5 MG/DL — SIGNIFICANT CHANGE UP (ref 8.4–10.5)
CHLORIDE SERPL-SCNC: 104 MMOL/L — SIGNIFICANT CHANGE UP (ref 96–108)
CO2 SERPL-SCNC: 25 MMOL/L — SIGNIFICANT CHANGE UP (ref 22–31)
CREAT SERPL-MCNC: 0.57 MG/DL — SIGNIFICANT CHANGE UP (ref 0.5–1.3)
EGFR: 116 ML/MIN/1.73M2 — SIGNIFICANT CHANGE UP
GLUCOSE BLDC GLUCOMTR-MCNC: 109 MG/DL — HIGH (ref 70–99)
GLUCOSE SERPL-MCNC: 113 MG/DL — HIGH (ref 70–99)
HCT VFR BLD CALC: 39.1 % — SIGNIFICANT CHANGE UP (ref 34.5–45)
HGB BLD-MCNC: 12.6 G/DL — SIGNIFICANT CHANGE UP (ref 11.5–15.5)
MAGNESIUM SERPL-MCNC: 2.1 MG/DL — SIGNIFICANT CHANGE UP (ref 1.6–2.6)
MCHC RBC-ENTMCNC: 29.2 PG — SIGNIFICANT CHANGE UP (ref 27–34)
MCHC RBC-ENTMCNC: 32.2 GM/DL — SIGNIFICANT CHANGE UP (ref 32–36)
MCV RBC AUTO: 90.7 FL — SIGNIFICANT CHANGE UP (ref 80–100)
NRBC # BLD: 0 /100 WBCS — SIGNIFICANT CHANGE UP (ref 0–0)
PHOSPHATE SERPL-MCNC: 2.1 MG/DL — LOW (ref 2.5–4.5)
PLATELET # BLD AUTO: 309 K/UL — SIGNIFICANT CHANGE UP (ref 150–400)
POTASSIUM SERPL-MCNC: 4.1 MMOL/L — SIGNIFICANT CHANGE UP (ref 3.5–5.3)
POTASSIUM SERPL-SCNC: 4.1 MMOL/L — SIGNIFICANT CHANGE UP (ref 3.5–5.3)
RBC # BLD: 4.31 M/UL — SIGNIFICANT CHANGE UP (ref 3.8–5.2)
RBC # FLD: 13.7 % — SIGNIFICANT CHANGE UP (ref 10.3–14.5)
SODIUM SERPL-SCNC: 137 MMOL/L — SIGNIFICANT CHANGE UP (ref 135–145)
WBC # BLD: 7.73 K/UL — SIGNIFICANT CHANGE UP (ref 3.8–10.5)
WBC # FLD AUTO: 7.73 K/UL — SIGNIFICANT CHANGE UP (ref 3.8–10.5)

## 2023-08-25 PROCEDURE — 36415 COLL VENOUS BLD VENIPUNCTURE: CPT

## 2023-08-25 PROCEDURE — 80048 BASIC METABOLIC PNL TOTAL CA: CPT

## 2023-08-25 PROCEDURE — 88302 TISSUE EXAM BY PATHOLOGIST: CPT

## 2023-08-25 PROCEDURE — 86850 RBC ANTIBODY SCREEN: CPT

## 2023-08-25 PROCEDURE — C1889: CPT

## 2023-08-25 PROCEDURE — 86900 BLOOD TYPING SEROLOGIC ABO: CPT

## 2023-08-25 PROCEDURE — C1781: CPT

## 2023-08-25 PROCEDURE — 94660 CPAP INITIATION&MGMT: CPT

## 2023-08-25 PROCEDURE — S2900: CPT

## 2023-08-25 PROCEDURE — 86901 BLOOD TYPING SEROLOGIC RH(D): CPT

## 2023-08-25 PROCEDURE — 85025 COMPLETE CBC W/AUTO DIFF WBC: CPT

## 2023-08-25 PROCEDURE — 84100 ASSAY OF PHOSPHORUS: CPT

## 2023-08-25 PROCEDURE — 85027 COMPLETE CBC AUTOMATED: CPT

## 2023-08-25 PROCEDURE — 83036 HEMOGLOBIN GLYCOSYLATED A1C: CPT

## 2023-08-25 PROCEDURE — 83735 ASSAY OF MAGNESIUM: CPT

## 2023-08-25 PROCEDURE — 82962 GLUCOSE BLOOD TEST: CPT

## 2023-08-25 RX ORDER — ACETAMINOPHEN 500 MG
20 TABLET ORAL
Qty: 320 | Refills: 0
Start: 2023-08-25 | End: 2023-08-28

## 2023-08-25 RX ORDER — ENOXAPARIN SODIUM 100 MG/ML
40 INJECTION SUBCUTANEOUS
Qty: 60 | Refills: 0
Start: 2023-08-25 | End: 2023-09-23

## 2023-08-25 RX ORDER — TIRZEPATIDE 15 MG/.5ML
5 INJECTION, SOLUTION SUBCUTANEOUS
Refills: 0 | DISCHARGE

## 2023-08-25 RX ORDER — METFORMIN HYDROCHLORIDE 850 MG/1
1 TABLET ORAL
Refills: 0 | DISCHARGE

## 2023-08-25 RX ORDER — OMEPRAZOLE 10 MG/1
1 CAPSULE, DELAYED RELEASE ORAL
Qty: 30 | Refills: 0
Start: 2023-08-25 | End: 2023-09-23

## 2023-08-25 RX ORDER — OXYCODONE HYDROCHLORIDE 5 MG/1
5 TABLET ORAL
Qty: 80 | Refills: 0
Start: 2023-08-25 | End: 2023-08-28

## 2023-08-25 RX ADMIN — Medication 105 MILLIGRAM(S): at 00:24

## 2023-08-25 RX ADMIN — ENOXAPARIN SODIUM 40 MILLIGRAM(S): 100 INJECTION SUBCUTANEOUS at 09:26

## 2023-08-25 RX ADMIN — SODIUM CHLORIDE 95 MILLILITER(S): 9 INJECTION, SOLUTION INTRAVENOUS at 00:23

## 2023-08-25 RX ADMIN — Medication 1000 MILLIGRAM(S): at 00:23

## 2023-08-25 RX ADMIN — SCOPALAMINE 1 PATCH: 1 PATCH, EXTENDED RELEASE TRANSDERMAL at 07:58

## 2023-08-25 RX ADMIN — Medication 1000 MILLIGRAM(S): at 06:56

## 2023-08-25 NOTE — DISCHARGE NOTE PROVIDER - CARE PROVIDER_API CALL
Jacques Perry  Surgery  [address] [place] [id number]  Phone: [phone]  Fax: [phone]  Follow Up Time:

## 2023-08-25 NOTE — DISCHARGE NOTE PROVIDER - NSDCMRMEDTOKEN_GEN_ALL_CORE_FT
losartan 25 mg oral tablet: 1 orally once a day (at bedtime)  Lovenox 40 mg/0.4 mL injectable solution: 40 milligram(s) subcutaneously 2 times a day MDD: 40 mg  MetFORMIN (Eqv-Fortamet) 1000 mg oral tablet, extended release: 1 orally 2 times a day  Mounjaro 5 mg/0.5 mL subcutaneous solution: 5 subcutaneously once a week  Ventolin HFA 90 mcg/inh inhalation aerosol: 2 inhaled prn   acetaminophen 160 mg/5 mL oral liquid: 20 milliliter(s) orally every 6 hours as needed for  moderate pain MDD: 80 mL  losartan 25 mg oral tablet: 1 orally once a day (at bedtime)  Lovenox 40 mg/0.4 mL injectable solution: 40 milligram(s) subcutaneously 2 times a day MDD: 80mg  MetFORMIN (Eqv-Fortamet) 1000 mg oral tablet, extended release: 1 orally 2 times a day  Mounjaro 5 mg/0.5 mL subcutaneous solution: 5 subcutaneously once a week  omeprazole 40 mg oral delayed release capsule: 1 cap(s) orally once a day MDD: 1 capsule  oxyCODONE 5 mg/5 mL oral solution: 5 milliliter(s) orally every 6 hours as needed for  severe pain MDD: 20mL  Ventolin HFA 90 mcg/inh inhalation aerosol: 2 inhaled prn   acetaminophen 160 mg/5 mL oral liquid: 20 milliliter(s) orally every 6 hours as needed for  moderate pain MDD: 80 mL  losartan 25 mg oral tablet: 1 orally once a day (at bedtime)  Lovenox 40 mg/0.4 mL injectable solution: 40 milligram(s) subcutaneously 2 times a day MDD: 80mg  omeprazole 40 mg oral delayed release capsule: 1 cap(s) orally once a day MDD: 1 capsule  oxyCODONE 5 mg/5 mL oral solution: 5 milliliter(s) orally every 6 hours as needed for  severe pain MDD: 20mL  Ventolin HFA 90 mcg/inh inhalation aerosol: 2 inhaled prn

## 2023-08-25 NOTE — DISCHARGE NOTE NURSING/CASE MANAGEMENT/SOCIAL WORK - PATIENT PORTAL LINK FT
You can access the FollowMyHealth Patient Portal offered by [facility] by registering at the following website: [email or web address]. By joining [employer]’s FollowMyHealth portal, you will also be able to view your health information using other applications (apps) compatible with our system.

## 2023-08-25 NOTE — DISCHARGE NOTE NURSING/CASE MANAGEMENT/SOCIAL WORK - NSDCPELOVENOXCOMP_GEN_ALL_CORE
Enoxaparin/Lovenox is used to treat and prevent blood clots. Use a different body area each time you give yourself a shot. Keep track of where you give each shot to make sure you rotate body areas. Use a new needle and syringe each time you inject your medicine. Never skip a dose of Enoxaparin/Lovenox. You must use this medicine on a fixed schedule.  NEVER TAKE A DOUBLE DOSE. Call your doctor or pharmacist if you miss a dose. Take Enoxaparin/Lovenox at the same time each morning and/or evening.
Go for blood tests as directed. Your doctor will do lab tests at regular visits to monitor the effects of this medicine. Please follow up with your doctor and keep your health care provider appointments.

## 2023-08-25 NOTE — DISCHARGE NOTE PROVIDER - NSDCFUADDINST_GEN_ALL_CORE_FT
Follow up with Dr. Perry's office in 1-2 weeks, you may call [phone] to make an appointment. Please follow the diet instructions you received from Dr. Perry's office, as well as the nutritionist's recommendations. You may shower, soap and water over the incisions but do not scrub. No heavy lifting (>10 lbs) or strenuous exercise. If you have a fever (>101 F) please contact your physician.     1) Please take omeprazole 40 mg oral delayed release capsule: 1 cap(s) orally once a day, you may open capsule and mix contents with applesauce  2) Please take Lovenox 40 mg/0.4 mL injectable solution: 40 milligram(s) subcutaneously 2 times a day  3) You may take acetaminophen 160 mg/5 mL oral liquid: 20 milliliter(s) orally every 6 hours as needed for moderate pain   4) You may take oxyCODONE 5 mg/5 mL oral solution: 5 milliliter(s) orally every 6 hours as needed for severe pain Follow up with Dr. Perry's office in 1-2 weeks, you may call [phone] to make an appointment. Please follow the diet instructions you received from Dr. Perry's office, as well as the nutritionist's recommendations. You may shower, soap and water over the incisions but do not scrub. No heavy lifting (>10 lbs) or strenuous exercise. If you have a fever (>101 F) please contact your physician.     1) Please take omeprazole 40 mg oral delayed release capsule: 1 cap(s) orally once a day, you may open capsule and mix contents with applesauce  2) Please take Lovenox 40 mg/0.4 mL injectable solution: 40 milligram(s) subcutaneously 2 times a day  3) You may take acetaminophen 160 mg/5 mL oral liquid: 20 milliliter(s) orally every 6 hours as needed for moderate pain   4) You may take oxyCODONE 5 mg/5 mL oral solution: 5 milliliter(s) orally every 6 hours as needed for severe pain  5) You may restart your home losartan  6) Take regular finger sticks to monitor blood sugar at home, do not take home Mounjaro or Metformin until cleared by Dr. Perry.

## 2023-08-25 NOTE — PROGRESS NOTE ADULT - SUBJECTIVE AND OBJECTIVE BOX
INTERVAL HPI/OVERNIGHT EVENTS: no acute events    STATUS POST: RA lap RYGB and HH repair     POST OPERATIVE DAY #: 2    SUBJECTIVE: Patient seen and examined at bedside with chief resident. Patient states that she is feeling well and that her pain is well controlled. She has been tolerating 4+oz/hour of her clear liquid diet since yesterday evening and denies nausea and emesis. She endorses ambulation and incentive spirometry use.      enoxaparin Injectable 40 milliGRAM(s) SubCutaneous every 12 hours      Vital Signs Last 24 Hrs  T(C): 37.1 (25 Aug 2023 00:24), Max: 37.2 (24 Aug 2023 17:17)  T(F): 98.8 (25 Aug 2023 00:24), Max: 99 (24 Aug 2023 17:17)  HR: 70 (25 Aug 2023 05:54) (63 - 88)  BP: 123/71 (25 Aug 2023 00:24) (105/55 - 149/76)  BP(mean): --  RR: 21 (25 Aug 2023 05:54) (14 - 21)  SpO2: 97% (25 Aug 2023 05:54) (91% - 100%)    Parameters below as of 25 Aug 2023 05:54  Patient On (Oxygen Delivery Method): BiPAP/CPAP    O2 Concentration (%): 40  I&O's Detail    24 Aug 2023 07:01  -  25 Aug 2023 07:00  --------------------------------------------------------  IN:    IV PiggyBack: 200 mL    Lactated Ringers: 330 mL    Lactated Ringers: 855 mL  Total IN: 1385 mL    OUT:    Voided (mL): 1000 mL  Total OUT: 1000 mL    Total NET: 385 mL          General: NAD, resting comfortably in bed  C/V: NSR  Pulm: Nonlabored breathing, no respiratory distress  Abd: soft, NT/ND.  Extrem: WWP, no edema, SCDs in place      LABS:                        12.6   10.52 )-----------( 305      ( 24 Aug 2023 05:30 )             39.3     08-24    136  |  102  |  9   ----------------------------<  104<H>  3.7   |  25  |  0.64    Ca    8.3<L>      24 Aug 2023 05:30  Phos  3.5     08-24  Mg     1.9     08-24        Urinalysis Basic - ( 24 Aug 2023 05:30 )    Color: x / Appearance: x / SG: x / pH: x  Gluc: 104 mg/dL / Ketone: x  / Bili: x / Urobili: x   Blood: x / Protein: x / Nitrite: x   Leuk Esterase: x / RBC: x / WBC x   Sq Epi: x / Non Sq Epi: x / Bacteria: x

## 2023-08-25 NOTE — DISCHARGE NOTE PROVIDER - NSDCFUSCHEDAPPT_GEN_ALL_CORE_FT
Jacques Perry  [facility]  BARIATRIC [place] [address]  Scheduled Appointment: 09/07/2023    Shanelle Sanchez  [facility]  PULMMED [address]  Scheduled Appointment: 09/26/2023    Raya Varela  [facility]  ENDOCRIN [address]  Scheduled Appointment: 10/11/2023    Kourtney Angel  [facility]  GYNONC [address]  Scheduled Appointment: 10/17/2023

## 2023-08-25 NOTE — DISCHARGE NOTE PROVIDER - DETAILS OF MALNUTRITION DIAGNOSIS/DIAGNOSES
This patient has been assessed with a concern for Malnutrition and was treated during this hospitalization for the following Nutrition diagnosis/diagnoses:     -  08/24/2023: Morbid obesity (BMI > 40)

## 2023-08-25 NOTE — DISCHARGE NOTE NURSING/CASE MANAGEMENT/SOCIAL WORK - NSDCPEFALRISK_GEN_ALL_CORE
For information on Fall & Injury Prevention, visit: [email or web address] OR  [email or web address] OR  https://www.cdc.gov/steadi/patient.html

## 2023-08-25 NOTE — PROGRESS NOTE ADULT - ASSESSMENT
43 F with pmhx of severe RADHA on CPAP, asthma, DM,  morbid obesity BMI 51, small hiatal hernia and pshx of endometrial polypectomy now s/p RA lap RYGB and HH repair (8/23)    BCLD  pain/nausea control  ISS  Protonix   OOBA/SCD/IS  Lovenox POD1  Nutrition consult  Am labs  CPAP  Replete electrolytes

## 2023-08-25 NOTE — DISCHARGE NOTE PROVIDER - NSDCCPTREATMENT_GEN_ALL_CORE_FT
PRINCIPAL PROCEDURE  Procedure: Laparoscopic gastric bypass  Findings and Treatment:       SECONDARY PROCEDURE  Procedure: Repair, hernia, hiatal, robot-assisted  Findings and Treatment:

## 2023-08-25 NOTE — PROGRESS NOTE ADULT - NUTRITIONAL ASSESSMENT
This patient has been assessed with a concern for Malnutrition and has been determined to have a diagnosis/diagnoses of Morbid obesity (BMI > 40).    This patient is being managed with:   Diet Clear Liquid-  Consistent Carbohydrate {Evening Snack} (CSTCHOSN)  Bariatric Clear Liquid (BARICLLIQ)  Entered: Aug 23 2023  7:11PM

## 2023-08-25 NOTE — DISCHARGE NOTE PROVIDER - HOSPITAL COURSE
43 F with PMH of severe RADHA on CPAP, asthma, DM,  morbid obesity BMI 51, small hiatal hernia and PSH of endometrial polypectomy was admitted on 8/23 for a robot-assisted laparoscopic vern-en-y gastric bypass and hiatal hernia repair with cruroplasty and mesh, which occurred on day of admission. The procedure was uncomplicated and the patient progressed well during her post-operative course. Her post-operative assessment was within normal limits and she passed her trial of void on post-op day 1. She tolerated her clear liquid diet and her pain was well controlled. On day of discharge patient was tolerating her clear liquid diet and was stable for discharge home.         43 F with PMH of severe RADHA on CPAP, asthma, DM,  morbid obesity BMI 51, small hiatal hernia and PSH of endometrial polypectomy was admitted on 8/23 for a robot-assisted laparoscopic vern-en-y gastric bypass and hiatal hernia repair with cruroplasty and mesh, which occurred on day of admission. The procedure was uncomplicated and the patient progressed well during her post-operative course. Her post-operative assessment was within normal limits and she passed her trial of void on post-op day 1. She tolerated her clear liquid diet and her pain was well controlled. On day of discharge patient was tolerating her clear liquid diet and was stable for discharge home.    1) Please take omeprazole 40 mg oral delayed release capsule: 1 cap(s) orally once a day, you may open capsule and mix contents with applesauce  2) Please take Lovenox 40 mg/0.4 mL injectable solution: 40 milligram(s) subcutaneously 2 times a day  3) You may take acetaminophen 160 mg/5 mL oral liquid: 20 milliliter(s) orally every 6 hours as needed for moderate pain   4) You may take oxyCODONE 5 mg/5 mL oral solution: 5 milliliter(s) orally every 6 hours as needed for severe pain 43 F with PMH of severe RADHA on CPAP, asthma, DM,  morbid obesity BMI 51, small hiatal hernia and PSH of endometrial polypectomy was admitted on 8/23 for a robot-assisted laparoscopic vern-en-y gastric bypass and hiatal hernia repair with cruroplasty and mesh, which occurred on day of admission. The procedure was uncomplicated and the patient progressed well during her post-operative course. Her post-operative assessment was within normal limits and she passed her trial of void on post-op day 1. She tolerated her clear liquid diet and her pain was well controlled. On day of discharge patient was tolerating her clear liquid diet and was stable for discharge home.    1) Please take omeprazole 40 mg oral delayed release capsule: 1 cap(s) orally once a day, you may open capsule and mix contents with applesauce  2) Please take Lovenox 40 mg/0.4 mL injectable solution: 40 milligram(s) subcutaneously 2 times a day  3) You may take acetaminophen 160 mg/5 mL oral liquid: 20 milliliter(s) orally every 6 hours as needed for moderate pain   4) You may take oxyCODONE 5 mg/5 mL oral solution: 5 milliliter(s) orally every 6 hours as needed for severe pain  5) You may restart your home losartan  6) Take regular finger sticks to monitor blood sugar at home, do not take home Mounjaro or Metformin until cleared by Dr. Perry.

## 2023-08-28 ENCOUNTER — NON-APPOINTMENT (OUTPATIENT)
Age: 43
End: 2023-08-28

## 2023-08-29 ENCOUNTER — RX RENEWAL (OUTPATIENT)
Age: 43
End: 2023-08-29

## 2023-08-29 NOTE — DISCHARGE NOTE PROVIDER - NSDCCAREPROVSEEN_GEN_ALL_CORE_FT
64yo M with a 1.4cm right mid-ureteral stone found to have R flank pain    Recommendations  - AM labs reviewed, WBC improving and Cr downtrending  - s/p R ureteral stent placement  - Tolerating reg diet  - Pending OR culture, continue abx until urine cultures have resulted. Once urine culture has returned patient can be discharged from urologic perspective with follow-up outpatient for URS planning with Dr. Crawford  - Thomas Cr  - Discussed with house staff Jacques Perry

## 2023-09-01 DIAGNOSIS — I10 ESSENTIAL (PRIMARY) HYPERTENSION: ICD-10-CM

## 2023-09-01 DIAGNOSIS — Z79.84 LONG TERM (CURRENT) USE OF ORAL HYPOGLYCEMIC DRUGS: ICD-10-CM

## 2023-09-01 DIAGNOSIS — G47.33 OBSTRUCTIVE SLEEP APNEA (ADULT) (PEDIATRIC): ICD-10-CM

## 2023-09-01 DIAGNOSIS — K44.9 DIAPHRAGMATIC HERNIA WITHOUT OBSTRUCTION OR GANGRENE: ICD-10-CM

## 2023-09-01 DIAGNOSIS — K76.0 FATTY (CHANGE OF) LIVER, NOT ELSEWHERE CLASSIFIED: ICD-10-CM

## 2023-09-01 DIAGNOSIS — E11.9 TYPE 2 DIABETES MELLITUS WITHOUT COMPLICATIONS: ICD-10-CM

## 2023-09-01 DIAGNOSIS — E01.0 IODINE-DEFICIENCY RELATED DIFFUSE (ENDEMIC) GOITER: ICD-10-CM

## 2023-09-01 DIAGNOSIS — F41.9 ANXIETY DISORDER, UNSPECIFIED: ICD-10-CM

## 2023-09-01 DIAGNOSIS — F32.A DEPRESSION, UNSPECIFIED: ICD-10-CM

## 2023-09-01 DIAGNOSIS — E66.01 MORBID (SEVERE) OBESITY DUE TO EXCESS CALORIES: ICD-10-CM

## 2023-09-01 DIAGNOSIS — J45.909 UNSPECIFIED ASTHMA, UNCOMPLICATED: ICD-10-CM

## 2023-09-06 LAB — SURGICAL PATHOLOGY STUDY: SIGNIFICANT CHANGE UP

## 2023-09-07 ENCOUNTER — APPOINTMENT (OUTPATIENT)
Dept: BARIATRICS | Facility: CLINIC | Age: 43
End: 2023-09-07
Payer: COMMERCIAL

## 2023-09-07 VITALS
SYSTOLIC BLOOD PRESSURE: 167 MMHG | DIASTOLIC BLOOD PRESSURE: 86 MMHG | TEMPERATURE: 97.6 F | HEIGHT: 62 IN | WEIGHT: 263 LBS | HEART RATE: 89 BPM | BODY MASS INDEX: 48.4 KG/M2 | OXYGEN SATURATION: 96 %

## 2023-09-07 PROBLEM — Z86.59 PERSONAL HISTORY OF OTHER MENTAL AND BEHAVIORAL DISORDERS: Chronic | Status: ACTIVE | Noted: 2023-08-22

## 2023-09-07 PROBLEM — R79.89 OTHER SPECIFIED ABNORMAL FINDINGS OF BLOOD CHEMISTRY: Chronic | Status: ACTIVE | Noted: 2023-08-22

## 2023-09-07 PROBLEM — K44.9 DIAPHRAGMATIC HERNIA WITHOUT OBSTRUCTION OR GANGRENE: Chronic | Status: ACTIVE | Noted: 2023-08-22

## 2023-09-07 PROCEDURE — 99024 POSTOP FOLLOW-UP VISIT: CPT

## 2023-09-07 NOTE — END OF VISIT
[Time Spent: ___ minutes] : I have spent [unfilled] minutes of time on the encounter. [FreeTextEntry3] : All medical record entries made by the Scribe were at my, JC Harrington , direction and personally dictated by me on 09/07/2023 . I have reviewed the chart and agree that the record accurately reflects my personal performance of the history, physical exam, assessment and plan. I have also personally directed, reviewed, and agreed with the chart.

## 2023-09-07 NOTE — ASSESSMENT
[FreeTextEntry1] : Patient is 43-year-old F 2 weeks s/p RYGB and hiatal hernia who presents her today for follow up. She is doing generally well. She has lost 16 lbs.  since the sx. Denies n, c, v, d, fever, chest pain, abd pain and SOB. She is meeting fluid, diet and protein intake, Tolerating PO. She has been taking vitamins and supplements. Has been walking daily. On exam, incisions have been healing well and appropriately. She continues to take lovenox injection. Will see the nutritionist. Follow up in 3 weeks.

## 2023-09-07 NOTE — PHYSICAL EXAM
[Obese] : obese [Normal] : no peripheral adenopathy appreciated [de-identified] : incisions healing well, dermabond intact. no evidence of bleeding, oozing, or infection\

## 2023-09-07 NOTE — HISTORY OF PRESENT ILLNESS
[de-identified] : Patient is a 43-year-old F 2 weeks s/p RYGB and hiatal hernia who presents her today for follow up. She is doing generally well. She has lost 16 lbs.  since the sx. Denies n, c, v, d, fever, chest pain, abd pain and SOB. She is meeting fluid, diet and protein intake, Tolerating PO. She has been taking vitamins and supplements. Has been walking daily. On exam, incisions has been healing well and appropriately. She continues to take lovenox injection. Will see the nutritionist. Follow up in 3 weeks.

## 2023-09-25 ENCOUNTER — RX RENEWAL (OUTPATIENT)
Age: 43
End: 2023-09-25

## 2023-09-26 ENCOUNTER — APPOINTMENT (OUTPATIENT)
Dept: PULMONOLOGY | Facility: CLINIC | Age: 43
End: 2023-09-26
Payer: COMMERCIAL

## 2023-09-26 VITALS
DIASTOLIC BLOOD PRESSURE: 82 MMHG | TEMPERATURE: 97.3 F | OXYGEN SATURATION: 97 % | HEART RATE: 101 BPM | SYSTOLIC BLOOD PRESSURE: 108 MMHG | WEIGHT: 252 LBS | HEIGHT: 62 IN | BODY MASS INDEX: 46.38 KG/M2

## 2023-09-26 DIAGNOSIS — G47.30 SLEEP APNEA, UNSPECIFIED: ICD-10-CM

## 2023-09-26 PROCEDURE — G0008: CPT

## 2023-09-26 PROCEDURE — 90686 IIV4 VACC NO PRSV 0.5 ML IM: CPT

## 2023-09-26 PROCEDURE — 99213 OFFICE O/P EST LOW 20 MIN: CPT | Mod: 25

## 2023-09-27 ENCOUNTER — RX RENEWAL (OUTPATIENT)
Age: 43
End: 2023-09-27

## 2023-10-05 ENCOUNTER — APPOINTMENT (OUTPATIENT)
Dept: BARIATRICS | Facility: CLINIC | Age: 43
End: 2023-10-05
Payer: COMMERCIAL

## 2023-10-05 VITALS
BODY MASS INDEX: 46.41 KG/M2 | DIASTOLIC BLOOD PRESSURE: 85 MMHG | HEART RATE: 87 BPM | SYSTOLIC BLOOD PRESSURE: 117 MMHG | WEIGHT: 252.19 LBS | OXYGEN SATURATION: 97 % | TEMPERATURE: 97.4 F | HEIGHT: 62 IN

## 2023-10-05 PROBLEM — Z86.39 PERSONAL HISTORY OF OTHER ENDOCRINE, NUTRITIONAL AND METABOLIC DISEASE: Chronic | Status: ACTIVE | Noted: 2023-08-22

## 2023-10-05 PROBLEM — Z87.898 PERSONAL HISTORY OF OTHER SPECIFIED CONDITIONS: Chronic | Status: ACTIVE | Noted: 2023-08-22

## 2023-10-05 PROBLEM — M26.629 ARTHRALGIA OF TEMPOROMANDIBULAR JOINT, UNSPECIFIED SIDE: Chronic | Status: ACTIVE | Noted: 2023-08-22

## 2023-10-05 PROBLEM — M19.90 UNSPECIFIED OSTEOARTHRITIS, UNSPECIFIED SITE: Chronic | Status: ACTIVE | Noted: 2023-08-22

## 2023-10-05 PROCEDURE — 99024 POSTOP FOLLOW-UP VISIT: CPT

## 2023-10-11 ENCOUNTER — APPOINTMENT (OUTPATIENT)
Dept: ENDOCRINOLOGY | Facility: CLINIC | Age: 43
End: 2023-10-11
Payer: COMMERCIAL

## 2023-10-11 DIAGNOSIS — E28.2 POLYCYSTIC OVARIAN SYNDROME: ICD-10-CM

## 2023-10-11 PROCEDURE — 99214 OFFICE O/P EST MOD 30 MIN: CPT

## 2023-10-11 RX ORDER — ERGOCALCIFEROL 1.25 MG/1
1.25 MG CAPSULE ORAL
Qty: 6 | Refills: 3 | Status: ACTIVE | COMMUNITY
Start: 2023-10-11 | End: 1900-01-01

## 2023-10-11 RX ORDER — CALCIUM CITRATE/VITAMIN D3 315MG-6.25
TABLET ORAL
Refills: 0 | Status: ACTIVE | COMMUNITY

## 2023-11-07 ENCOUNTER — APPOINTMENT (OUTPATIENT)
Dept: GYNECOLOGIC ONCOLOGY | Facility: CLINIC | Age: 43
End: 2023-11-07
Payer: COMMERCIAL

## 2023-11-07 VITALS
WEIGHT: 242 LBS | OXYGEN SATURATION: 90 % | TEMPERATURE: 97.4 F | DIASTOLIC BLOOD PRESSURE: 80 MMHG | BODY MASS INDEX: 44.53 KG/M2 | SYSTOLIC BLOOD PRESSURE: 122 MMHG | HEIGHT: 62 IN | HEART RATE: 76 BPM

## 2023-11-07 LAB
HCG UR QL: NEGATIVE
QUALITY CONTROL: YES

## 2023-11-07 PROCEDURE — 99214 OFFICE O/P EST MOD 30 MIN: CPT | Mod: 25

## 2023-11-07 PROCEDURE — 81025 URINE PREGNANCY TEST: CPT

## 2023-11-07 PROCEDURE — 58100 BIOPSY OF UTERUS LINING: CPT

## 2023-11-16 ENCOUNTER — TRANSCRIPTION ENCOUNTER (OUTPATIENT)
Age: 43
End: 2023-11-16

## 2023-11-16 ENCOUNTER — OUTPATIENT (OUTPATIENT)
Dept: OUTPATIENT SERVICES | Facility: HOSPITAL | Age: 43
LOS: 1 days | End: 2023-11-16
Payer: COMMERCIAL

## 2023-11-16 ENCOUNTER — APPOINTMENT (OUTPATIENT)
Dept: ULTRASOUND IMAGING | Facility: HOSPITAL | Age: 43
End: 2023-11-16
Payer: COMMERCIAL

## 2023-11-16 DIAGNOSIS — Z41.9 ENCOUNTER FOR PROCEDURE FOR PURPOSES OTHER THAN REMEDYING HEALTH STATE, UNSPECIFIED: Chronic | ICD-10-CM

## 2023-11-16 DIAGNOSIS — Z98.890 OTHER SPECIFIED POSTPROCEDURAL STATES: Chronic | ICD-10-CM

## 2023-11-16 DIAGNOSIS — K08.409 PARTIAL LOSS OF TEETH, UNSPECIFIED CAUSE, UNSPECIFIED CLASS: Chronic | ICD-10-CM

## 2023-11-16 LAB — CORE LAB BIOPSY: NORMAL

## 2023-11-16 PROCEDURE — 76536 US EXAM OF HEAD AND NECK: CPT

## 2023-11-16 PROCEDURE — 76536 US EXAM OF HEAD AND NECK: CPT | Mod: 26

## 2023-12-14 ENCOUNTER — APPOINTMENT (OUTPATIENT)
Dept: BARIATRICS | Facility: CLINIC | Age: 43
End: 2023-12-14
Payer: COMMERCIAL

## 2023-12-14 VITALS
SYSTOLIC BLOOD PRESSURE: 123 MMHG | TEMPERATURE: 97.6 F | WEIGHT: 227 LBS | BODY MASS INDEX: 41.77 KG/M2 | OXYGEN SATURATION: 98 % | HEART RATE: 84 BPM | HEIGHT: 62 IN | DIASTOLIC BLOOD PRESSURE: 84 MMHG

## 2023-12-14 PROCEDURE — 99214 OFFICE O/P EST MOD 30 MIN: CPT

## 2023-12-15 NOTE — ASSESSMENT
[FreeTextEntry1] : Patient is a 43-year-old F 4 mo s/p RYGB who presents here today for post op care. Pt reports doing well overall and has been tolerating her diet though certain foods she is still unable to tolerate. Explained to pt this is normal in the early post op period, will consult with nutrition today. Denies frequent n,v,c,d, reflux, or abd pn. States she is compliant with her daily vit and is physically active throughout the week. Pt has lost about 45 lbs since sx which she is very pleased with. No other concerns at this time.

## 2023-12-15 NOTE — END OF VISIT
[Time Spent: ___ minutes] : I have spent [unfilled] minutes of time on the encounter. [FreeTextEntry3] : All medical record entries made by the Scribe were at my, JC Harrington , direction and personally dictated by me on 12/14/2023 . I have reviewed the chart and agree that the record accurately reflects my personal performance of the history, physical exam, assessment and plan. I have also personally directed, reviewed, and agreed with the chart.

## 2023-12-15 NOTE — HISTORY OF PRESENT ILLNESS
[de-identified] : Patient is a 43-year-old F 4 mo s/p RYGB who presents here today for post op care. Pt reports doing well overall and has been tolerating her diet though certain foods she is still unable to tolerate. Explained to pt this is normal in the early post op period, will consult with nutrition today. Denies frequent n,v,c,d, reflux, or abd pn. States she is compliant with her daily vit and is physically active throughout the week. Pt has lost about 45 lbs since sx which she is very pleased with. No other concerns at this time.

## 2023-12-18 ENCOUNTER — APPOINTMENT (OUTPATIENT)
Dept: HUMAN REPRODUCTION | Facility: CLINIC | Age: 43
End: 2023-12-18
Payer: COMMERCIAL

## 2023-12-18 PROCEDURE — 99204 OFFICE O/P NEW MOD 45 MIN: CPT | Mod: NC

## 2023-12-18 PROCEDURE — 99499PP: CUSTOM

## 2024-01-01 NOTE — HISTORY OF PRESENT ILLNESS
[FreeTextEntry1] : Ms. Wolf is a 43 year-old woman with a history of type 2 diabetes mellitus, polycystic ovarian syndrome, elevated body mass index, thyroid nodules, asthma, seasonal allergies, complex atypical endometrial hyperplasia presenting for follow-up of her endocrine issues. I saw her for an initial visit in August 2019 and last in January 2023; she is a former patient of Eduin Vásquez and Shavon Ramírez. \par \par Type 2 diabetes mellitus. Point-of-care HbA1c 6.0% and glucose 99 mg/dL today; HbA1c 6.3% in January 2023. No known history of micro- or macrovascular complications.\par She was diagnosed with diabetes in October 2019 by hemoglobin A1c. No hospitalizations for hypo- or hyperglycemia.\par In January 2023 we continued metformin ER 1000 mg twice daily, stopped Trulicity 0.75 mg weekly and started Mounjaro up to 5 mg weekly. She has continued Trulicity because she has been unable to obtain Mounjaro.\par She is not on a blood pressure regimen\par She is not on a statin for cholesterol\par Nephrology screening: Due\par Last ophthalmology appointment: February 2023; annual\par Last dental appointment: Every six months\par \par Elevated body mass index. Comorbidity of type 2 diabetes mellitus.\par Her young adult weight was around 200 pounds. She gained weight in college and then on subsequent prior antidepressant therapy. Her highest weight was 310 pounds.\par She was on phentermine in the past but did not tolerate due to palpitations. \par She was on bupropion in the past for depression/anxiety, off for a few years. \par She has tolerated metformin and Trulicity. \par \par Multiple thyroid nodules.\par She was diagnosed with thyroid nodules in 2017 on physical examination, confirmed with thyroid ultrasound.\par Thyroid ultrasound in June 2018 demonstrated a 2.0 x 1.3 x 1.5 cm left inferior pole nodule and a right 1.0 x 0.5 x 1.0 cm right inferior nodule without suspicious features. \par Fine needle aspiration of the left inferior nodule in June 2018 was benign ([place] II).\par Thyroid ultrasound from January 2020 with a right lower pole 1.1 x 0.6 x 0.9 cm hypoechoic nodule with microcalcifications meeting criteria for biopsy. \par Thyroid ultrasound from August 2022 demonstrated subcentimeter nodules on the right and a left lower pole 1.4 x 1.1 x 2.0 cm hypoechoic nodule with microcalcifications. The left lower pole nodule was biopsied with cytopathology benign ([place] II).\par She has been clinically and biochemically euthyroid. \par Her mother has a history of goiter.\par \par Polycystic ovarian syndrome.\par She was diagnosed with polycystic ovarian syndrome in the setting of oligomenorrhea, polycystic ovaries on imaging, and biochemical/clinical evidence of hyperandrogenism. She has hirsutism above her lip and chin that she plucks. \par Previous evaluation for other causes of oligomenorrhea was unrevealing. \par She was treated with a combined oral contraceptive pill for about four years and has been off since around 2002.\par She has had a number of endometrial biopsies for complex atypical endometrial hyperplasia. Of note, she is treated with a Mirena intrauterine device for her complex atypical endometrial hyperplasia. She was previously treated with megestrol acetate.\par \par Interim History \par In January 2023 we continued metformin ER 1000 mg twice daily, stopped Trulicity 0.75 mg weekly and started Mounjaro up to 5 mg weekly. She has continued Trulicity because she has been unable to obtain Mounjaro.\par She has been pursuing bariatric surgery. 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 \par She has seen multiple providers; notes reviewed. Last laboratory results reviewed. PTH 80 pg/mL (normal: 15-65) with normal serum calcium in March; 25-hydroxyvitamin D 32.1 ng/mL at that time but previously 29.4 ng/mL in January.\par No chest pain, shortness of breath, polyuria/polydipsia, lower extremity numbness/tingling. \par Medical and surgical history, medications, allergies, social and family history reviewed and updated as needed.
no

## 2024-01-03 ENCOUNTER — RX RENEWAL (OUTPATIENT)
Age: 44
End: 2024-01-03

## 2024-01-03 RX ORDER — ERGOCALCIFEROL 1.25 MG/1
1.25 MG CAPSULE, LIQUID FILLED ORAL
Qty: 6 | Refills: 3 | Status: ACTIVE | COMMUNITY
Start: 2024-01-03 | End: 1900-01-01

## 2024-01-04 ENCOUNTER — TRANSCRIPTION ENCOUNTER (OUTPATIENT)
Age: 44
End: 2024-01-04

## 2024-01-04 ENCOUNTER — APPOINTMENT (OUTPATIENT)
Dept: HUMAN REPRODUCTION | Facility: CLINIC | Age: 44
End: 2024-01-04
Payer: COMMERCIAL

## 2024-01-04 PROCEDURE — 76857 US EXAM PELVIC LIMITED: CPT | Mod: NC

## 2024-01-04 PROCEDURE — 99213 OFFICE O/P EST LOW 20 MIN: CPT | Mod: NC

## 2024-01-04 PROCEDURE — ZZZZZ: CPT

## 2024-01-04 PROCEDURE — 99499PP: CUSTOM

## 2024-01-04 PROCEDURE — 36415 COLL VENOUS BLD VENIPUNCTURE: CPT | Mod: NC

## 2024-01-05 ENCOUNTER — TRANSCRIPTION ENCOUNTER (OUTPATIENT)
Age: 44
End: 2024-01-05

## 2024-01-08 ENCOUNTER — TRANSCRIPTION ENCOUNTER (OUTPATIENT)
Age: 44
End: 2024-01-08

## 2024-01-08 RX ORDER — METFORMIN ER 500 MG 500 MG/1
500 TABLET ORAL
Qty: 360 | Refills: 3 | Status: DISCONTINUED | COMMUNITY
Start: 2017-04-06 | End: 2024-01-08

## 2024-01-08 RX ORDER — TIRZEPATIDE 5 MG/.5ML
5 INJECTION, SOLUTION SUBCUTANEOUS
Qty: 3 | Refills: 1 | Status: ACTIVE | COMMUNITY
Start: 2023-05-18 | End: 1900-01-01

## 2024-01-08 NOTE — PHYSICAL EXAM
[Kyphosis] : no kyphosis present [de-identified] : no moon facies, no supraclavicular fat pads [de-identified] : Foot examination performed in January 2023

## 2024-01-08 NOTE — ASSESSMENT
[FreeTextEntry1] : Type 2 diabetes mellitus/elevated body mass index. HbA1c 6.0% in August 2023. No known history of micro- or macrovascular complications. I congratulated her on her excellent glycemic control and overall weight loss. We discussed the importance of diet and exercise and lifestyle modification for glycemic control and weight loss. We discussed pharmacologic options for glycemic control and weight loss. We will restart Mounjaro as below. Restart Mounjaro 2.5 mg weekly for four weeks, then 5 mg weekly She is not on a blood pressure regimen; blood pressure around goal She is not on a statin for cholesterol; LDL below 70 mg/dL Nephrology screening: Urine microalbumin within range in June 2023 Last ophthalmology appointment: February 2023; annual Last dental appointment: Every six months  Thyroid nodules. She has been clinically and biochemically euthyroid. Thyroid ultrasound in June 2018 demonstrated a 2.0 x 1.3 x 1.5 cm left inferior pole nodule and a right 1.0 x 0.5 x 1.0 cm right inferior nodule without suspicious features. Fine needle aspiration of the left inferior nodule in June 2018 was benign ([place] II). Thyroid ultrasound from January 2020 demonstrated the right lower pole now 1.1 x 0.6 x 0.9 cm, hypoechoic, with microcalcifications meeting criteria for biopsy. Thyroid ultrasound from August 2022 demonstrated subcentimeter nodules on the right and a left lower pole 1.4 x 1.1 x 2.0 cm hypoechoic nodule with microcalcifications. The left lower pole nodule was biopsied with cytopathology benign ([place] II). We discussed that approximately 95 percent of all thyroid nodules are caused by benign conditions. Interval thyroid ultrasound  Polycystic ovarian syndrome. She was diagnosed with polycystic ovarian syndrome in the setting of oligomenorrhea, polycystic ovaries on imaging, and biochemical/clinical evidence of hyperandrogenism. Previous evaluation for other causes of oligomenorrhea was unrevealing. She has a Mirena intrauterine device in place.  Hyperparathyroidism. She has presumed secondary hyperparathyroidism in the setting of low calcium intake. She has yogurt or cheese a few times a week. She is currently taking Citracal 630 mg daily. We will start ergocalciferol 50,000 intl units every two weeks. We will monitor a calciotropic panel next visit.   Return to see me in 3 months

## 2024-01-08 NOTE — ADDENDUM
[FreeTextEntry1] : Thyroid ultrasound as below; discussed with Ms. Wolf. The left lower pole nodule measured 1.5 x 1.2 x 1.7 cm (-0.65% change in volume from time of biopsy), with no other nodules meeting criteria for biopsy. We can plan to monitor in one year. 11/17/23  Ms. Wolf has been following with Dr. Ciro Pride with plan for oocyte retrieval in February and possible in vitro fertilization in August. I advised she hold Mounjaro for 4 weeks prior to oocyte retrieval, after which she can resume through July, at least 4 weeks prior to in vitro fertilization. There are no endocrine contraindications for oocyte retrieval. 1/08/24

## 2024-01-08 NOTE — HISTORY OF PRESENT ILLNESS
[FreeTextEntry1] : Ms. Wolf is a 43 year-old woman with a history of type 2 diabetes mellitus, polycystic ovarian syndrome, elevated body mass index, thyroid nodules, asthma, seasonal allergies, complex atypical endometrial hyperplasia presenting for follow-up of her endocrine issues. I saw her for an initial visit in August 2019 and last in May 2023; she is a former patient of Eduin Vásquez and Shavon Ramírez.   Type 2 diabetes mellitus. HbA1c 6.0% in August 2023; HbA1c 6.0% in May 2023, 6.3% in January 2023. No known history of micro- or macrovascular complications. She is status post Annamarie-en-Y gastric bypass on August 23, 2023. She was diagnosed with diabetes in October 2019 by hemoglobin A1c. No hospitalizations for hypo- or hyperglycemia. In January 2023 we continued metformin ER 1000 mg twice daily, stopped Trulicity 0.75 mg weekly and started Mounjaro up to 5 mg weekly.  In May 2023 we continued metformin ER 1000 mg twice daily and started Mounjaro up to 5 mg weekly. She has been off metformin and Mounjaro since August after her bariatric surgery. She is not on a blood pressure regimen She is not on a statin for cholesterol Nephrology screening: Urine microalbumin within the normal range in June 2023 Last ophthalmology appointment: February 2023; annual Last dental appointment: Every six months  Multiple thyroid nodules. She was diagnosed with thyroid nodules in 2017 on physical examination, confirmed with thyroid ultrasound. Thyroid ultrasound in June 2018 demonstrated a 2.0 x 1.3 x 1.5 cm left inferior pole nodule and a right 1.0 x 0.5 x 1.0 cm right inferior nodule without suspicious features.  Fine needle aspiration of the left inferior nodule in June 2018 was benign ([place] II). Thyroid ultrasound from January 2020 with a right lower pole 1.1 x 0.6 x 0.9 cm hypoechoic nodule with microcalcifications meeting criteria for biopsy.  Thyroid ultrasound from August 2022 demonstrated subcentimeter nodules on the right and a left lower pole 1.4 x 1.1 x 2.0 cm hypoechoic nodule with microcalcifications. The left lower pole nodule was biopsied with cytopathology benign ([place] II). She has been clinically and biochemically euthyroid.  Her mother has a history of goiter.  Polycystic ovarian syndrome. She was diagnosed with polycystic ovarian syndrome in the setting of oligomenorrhea, polycystic ovaries on imaging, and biochemical/clinical evidence of hyperandrogenism. She has hirsutism above her lip and chin that she plucks.  Previous evaluation for other causes of oligomenorrhea was unrevealing.  She was treated with a combined oral contraceptive pill for about four years and has been off since around 2002. She has had a number of endometrial biopsies for complex atypical endometrial hyperplasia. Of note, she is treated with a Mirena intrauterine device for her complex atypical endometrial hyperplasia. She was previously treated with megestrol acetate.  Interim History  In May 2023 we continued metformin ER 1000 mg twice daily and started Mounjaro up to 5 mg weekly. She has been off metformin and Mounjaro since August after her Annamarie-en-Y gastric bypass on August 23, 2023. She has seen multiple providers; notes reviewed. Last laboratory results reviewed. She has had numbness on the outside of her right thigh since her surgery; she has discussed with her surgeon. Weight is down 27 pounds since August, with recent plateau. No chest pain, shortness of breath, polyuria/polydipsia, lower extremity numbness/tingling.  Medical and surgical history, medications, allergies, social and family history reviewed and updated as needed.

## 2024-01-09 ENCOUNTER — TRANSCRIPTION ENCOUNTER (OUTPATIENT)
Age: 44
End: 2024-01-09

## 2024-01-10 ENCOUNTER — TRANSCRIPTION ENCOUNTER (OUTPATIENT)
Age: 44
End: 2024-01-10

## 2024-01-12 ENCOUNTER — RX RENEWAL (OUTPATIENT)
Age: 44
End: 2024-01-12

## 2024-01-12 ENCOUNTER — TRANSCRIPTION ENCOUNTER (OUTPATIENT)
Age: 44
End: 2024-01-12

## 2024-01-24 ENCOUNTER — APPOINTMENT (OUTPATIENT)
Dept: ENDOCRINOLOGY | Facility: CLINIC | Age: 44
End: 2024-01-24
Payer: COMMERCIAL

## 2024-01-24 ENCOUNTER — APPOINTMENT (OUTPATIENT)
Dept: INTERNAL MEDICINE | Facility: CLINIC | Age: 44
End: 2024-01-24
Payer: COMMERCIAL

## 2024-01-24 VITALS
WEIGHT: 211.5 LBS | HEIGHT: 62 IN | SYSTOLIC BLOOD PRESSURE: 110 MMHG | HEART RATE: 79 BPM | DIASTOLIC BLOOD PRESSURE: 72 MMHG | OXYGEN SATURATION: 95 % | TEMPERATURE: 97.2 F | BODY MASS INDEX: 38.92 KG/M2

## 2024-01-24 VITALS
DIASTOLIC BLOOD PRESSURE: 70 MMHG | BODY MASS INDEX: 38.59 KG/M2 | HEART RATE: 79 BPM | WEIGHT: 211 LBS | SYSTOLIC BLOOD PRESSURE: 117 MMHG

## 2024-01-24 DIAGNOSIS — E04.2 NONTOXIC MULTINODULAR GOITER: ICD-10-CM

## 2024-01-24 DIAGNOSIS — E55.9 VITAMIN D DEFICIENCY, UNSPECIFIED: ICD-10-CM

## 2024-01-24 DIAGNOSIS — E11.9 TYPE 2 DIABETES MELLITUS W/OUT COMPLICATIONS: ICD-10-CM

## 2024-01-24 DIAGNOSIS — Z86.39 PERSONAL HISTORY OF OTHER ENDOCRINE, NUTRITIONAL AND METABOLIC DISEASE: ICD-10-CM

## 2024-01-24 DIAGNOSIS — E66.9 OBESITY, UNSPECIFIED: ICD-10-CM

## 2024-01-24 PROCEDURE — G2211 COMPLEX E/M VISIT ADD ON: CPT

## 2024-01-24 PROCEDURE — 99214 OFFICE O/P EST MOD 30 MIN: CPT

## 2024-01-24 NOTE — ASSESSMENT
[FreeTextEntry1] : Weight loss progressing nicely. No longer "morbidly obese". I think she is now able to get off the ARB. WIll hold x 1 week and f/u.  No need for labs today-results reviewed with patient.  Discussed exercise regimen and HR goals.

## 2024-01-24 NOTE — PHYSICAL EXAM
[Alert] : alert [Healthy Appearance] : healthy appearance [No Acute Distress] : no acute distress [Normal Sclera/Conjunctiva] : normal sclera/conjunctiva [Normal Hearing] : hearing was normal [No Respiratory Distress] : no respiratory distress [No Stigmata of Cushings Syndrome] : no stigmata of Cushings Syndrome [Right foot was examined, including] : right foot ~C was examined, including visual inspection with sensory and pulse exams [Normal Gait] : normal gait [Left foot was examined, including] : left foot ~C was examined, including visual inspection with sensory and pulse exams [Normal] : normal [2+] : 2+ in the dorsalis pedis [Normal Insight/Judgement] : insight and judgment were intact

## 2024-01-24 NOTE — REVIEW OF SYSTEMS
[Fever] : no fever [Fatigue] : no fatigue [Recent Change In Weight] : ~T recent weight change [Headache] : no headache [Dizziness] : dizziness [Fainting] : no fainting [Memory Loss] : no memory loss [Unsteady Walking] : no ataxia [Negative] : Psychiatric

## 2024-01-24 NOTE — HISTORY OF PRESENT ILLNESS
[de-identified] : Pt is a 42 y/o F with pmhx of severe RADHA on CPAP, HTN, PCOS, diabetes, morbid obesity (BMI 51), who presents today for f/u. S/p RYGB this summer. Lost signficant weight. Also on Mounjaro. Pt using her CPAP machine as directed.    She is c/o postural dizziness and dizziness with exertion. On low dose ARB.  No CP, SOB.  Working out a lot more.  Recently had labs.

## 2024-01-24 NOTE — HEALTH RISK ASSESSMENT
[0] : 1) Little interest or pleasure doing things: Not at all (0) [[id number]] : 0 [PHQ-2 Negative - No further assessment needed] : PHQ-2 Negative - No further assessment needed [Never] : Never Cartilage Graft Text: The defect edges were debeveled with a #15 scalpel blade.  Given the location of the defect, shape of the defect, the fact the defect involved a full thickness cartilage defect a cartilage graft was deemed most appropriate.  An appropriate donor site was identified, cleansed, and anesthetized. The cartilage graft was then harvested and transferred to the recipient site, oriented appropriately and then sutured into place.  The secondary defect was then repaired using a primary closure.

## 2024-01-29 LAB
25(OH)D3 SERPL-MCNC: 41.2 NG/ML
A-TOCOPHEROL VIT E SERPL-MCNC: 7.2 MG/L
ALBUMIN SERPL ELPH-MCNC: 4.4 G/DL
ALP BLD-CCNC: 40 U/L
ALT SERPL-CCNC: 38 U/L
ANION GAP SERPL CALC-SCNC: 14 MMOL/L
APTT BLD: 37.8 SEC
AST SERPL-CCNC: 26 U/L
BASOPHILS # BLD AUTO: 0.02 K/UL
BASOPHILS NFR BLD AUTO: 0.3 %
BETA+GAMMA TOCOPHEROL SERPL-MCNC: 0.3 MG/L
BILIRUB SERPL-MCNC: 1.6 MG/DL
BUN SERPL-MCNC: 10 MG/DL
CA-I SERPL-SCNC: 5 MG/DL
CALCIUM SERPL-MCNC: 9.5 MG/DL
CALCIUM SERPL-MCNC: 9.5 MG/DL
CHLORIDE SERPL-SCNC: 101 MMOL/L
CHOLEST SERPL-MCNC: 109 MG/DL
CO2 SERPL-SCNC: 22 MMOL/L
CREAT SERPL-MCNC: 0.59 MG/DL
EGFR: 114 ML/MIN/1.73M2
EOSINOPHIL # BLD AUTO: 0.16 K/UL
EOSINOPHIL NFR BLD AUTO: 2.4 %
ESTIMATED AVERAGE GLUCOSE: 108 MG/DL
FOLATE SERPL-MCNC: >20 NG/ML
GLUCOSE SERPL-MCNC: 86 MG/DL
HBA1C MFR BLD HPLC: 5.4 %
HCT VFR BLD CALC: 40.7 %
HDLC SERPL-MCNC: 43 MG/DL
HGB BLD-MCNC: 13.6 G/DL
IMM GRANULOCYTES NFR BLD AUTO: 0.1 %
INR PPP: 1.05 RATIO
IRON SATN MFR SERPL: 31 %
IRON SERPL-MCNC: 93 UG/DL
LDLC SERPL CALC-MCNC: 50 MG/DL
LYMPHOCYTES # BLD AUTO: 1.76 K/UL
LYMPHOCYTES NFR BLD AUTO: 26.2 %
MAN DIFF?: NORMAL
MCHC RBC-ENTMCNC: 30.3 PG
MCHC RBC-ENTMCNC: 33.4 GM/DL
MCV RBC AUTO: 90.6 FL
MONOCYTES # BLD AUTO: 0.48 K/UL
MONOCYTES NFR BLD AUTO: 7.1 %
NEUTROPHILS # BLD AUTO: 4.29 K/UL
NEUTROPHILS NFR BLD AUTO: 63.9 %
NONHDLC SERPL-MCNC: 66 MG/DL
PARATHYROID HORMONE INTACT: 60 PG/ML
PLATELET # BLD AUTO: 292 K/UL
POTASSIUM SERPL-SCNC: 4.2 MMOL/L
PREALB SERPL NEPH-MCNC: 16 MG/DL
PROT SERPL-MCNC: 6.6 G/DL
PT BLD: 11.9 SEC
RBC # BLD: 4.49 M/UL
RBC # FLD: 14.9 %
SODIUM SERPL-SCNC: 137 MMOL/L
TIBC SERPL-MCNC: 294 UG/DL
TRIGL SERPL-MCNC: 80 MG/DL
TSH SERPL-ACNC: 2.64 UIU/ML
UIBC SERPL-MCNC: 202 UG/DL
VIT A SERPL-MCNC: 26.3 UG/DL
VIT B1 SERPL-MCNC: 139.3 NMOL/L
VIT B12 SERPL-MCNC: 549 PG/ML
WBC # FLD AUTO: 6.72 K/UL
ZINC SERPL-MCNC: 76 UG/DL

## 2024-01-31 ENCOUNTER — TRANSCRIPTION ENCOUNTER (OUTPATIENT)
Age: 44
End: 2024-01-31

## 2024-02-01 ENCOUNTER — APPOINTMENT (OUTPATIENT)
Dept: INTERNAL MEDICINE | Facility: CLINIC | Age: 44
End: 2024-02-01
Payer: COMMERCIAL

## 2024-02-01 VITALS — SYSTOLIC BLOOD PRESSURE: 99 MMHG | DIASTOLIC BLOOD PRESSURE: 70 MMHG

## 2024-02-01 DIAGNOSIS — R42 DIZZINESS AND GIDDINESS: ICD-10-CM

## 2024-02-01 DIAGNOSIS — I10 ESSENTIAL (PRIMARY) HYPERTENSION: ICD-10-CM

## 2024-02-01 PROCEDURE — 99213 OFFICE O/P EST LOW 20 MIN: CPT

## 2024-02-01 PROCEDURE — G2211 COMPLEX E/M VISIT ADD ON: CPT

## 2024-02-01 NOTE — HISTORY OF PRESENT ILLNESS
[Home] : at home, [unfilled] , at the time of the visit. [Medical Office: ([facility])___] : at the medical office located in  [Verbal consent obtained from patient] : the patient, [unfilled] [de-identified] : Pt presents for BP followup. She saw me last week and was c/o postural dizziness. BP was low. Weight loss progressing nicely. No longer "morbidly obese". We held ARB. Less dizzy.

## 2024-02-01 NOTE — ASSESSMENT
[FreeTextEntry1] : BP good off ARB. Continue to hold for now. Follow closely.  Weight loss support provided.

## 2024-02-07 NOTE — PRE-ANESTHESIA EVALUATION ADULT - WEIGHT IN KG
Patient Assistance    Called [facility] must re-apply since the attestation was sent in past the deadline. Enrollment form completed. Need Provider signature               Additional notes: free drug pending                           
[phone]

## 2024-02-09 ENCOUNTER — RX RENEWAL (OUTPATIENT)
Age: 44
End: 2024-02-09

## 2024-02-09 RX ORDER — LOSARTAN POTASSIUM 25 MG/1
25 TABLET, FILM COATED ORAL
Qty: 30 | Refills: 0 | Status: ACTIVE | COMMUNITY
Start: 2022-07-21 | End: 1900-01-01

## 2024-02-22 ENCOUNTER — TRANSCRIPTION ENCOUNTER (OUTPATIENT)
Age: 44
End: 2024-02-22

## 2024-02-22 ENCOUNTER — APPOINTMENT (OUTPATIENT)
Dept: HUMAN REPRODUCTION | Facility: CLINIC | Age: 44
End: 2024-02-22
Payer: COMMERCIAL

## 2024-02-22 PROCEDURE — 76830 TRANSVAGINAL US NON-OB: CPT | Mod: NC

## 2024-02-22 PROCEDURE — 84702 CHORIONIC GONADOTROPIN TEST: CPT | Mod: NC

## 2024-02-22 PROCEDURE — S4042: CPT | Mod: NC

## 2024-02-22 PROCEDURE — 99213 OFFICE O/P EST LOW 20 MIN: CPT | Mod: NC

## 2024-02-22 PROCEDURE — 83002 ASSAY OF GONADOTROPIN (LH): CPT | Mod: NC,QW

## 2024-02-22 PROCEDURE — 36415 COLL VENOUS BLD VENIPUNCTURE: CPT | Mod: NC

## 2024-02-22 PROCEDURE — 82670 ASSAY OF TOTAL ESTRADIOL: CPT | Mod: NC

## 2024-02-22 PROCEDURE — 84144 ASSAY OF PROGESTERONE: CPT | Mod: NC

## 2024-03-19 ENCOUNTER — APPOINTMENT (OUTPATIENT)
Dept: BARIATRICS | Facility: CLINIC | Age: 44
End: 2024-03-19
Payer: COMMERCIAL

## 2024-03-19 VITALS
SYSTOLIC BLOOD PRESSURE: 134 MMHG | HEART RATE: 70 BPM | HEIGHT: 62 IN | BODY MASS INDEX: 36.16 KG/M2 | DIASTOLIC BLOOD PRESSURE: 86 MMHG | OXYGEN SATURATION: 99 % | TEMPERATURE: 97.3 F | WEIGHT: 196.5 LBS

## 2024-03-19 PROCEDURE — 99215 OFFICE O/P EST HI 40 MIN: CPT

## 2024-03-19 NOTE — END OF VISIT
[Time Spent: ___ minutes] : I have spent [unfilled] minutes of time on the encounter. [FreeTextEntry3] :  All medical record entries made by the Scribe were at my, JC Harrington , direction and personally dictated by me on 03/19/2024 . I have reviewed the chart and agree that the record accurately reflects my personal performance of the history, physical exam, assessment and plan. I have also personally directed, reviewed, and agreed with the chart.

## 2024-03-19 NOTE — ASSESSMENT
[FreeTextEntry1] : Pt is a 45 y/o F almost 7 months s/p RYGB who presents today for a follow up. BMI today is 29 and has lost almost 85 lbs since sx. Pt is doing really well and denies n/v/c/d/ SOB, acid reflux, po intolerance, chest pain, abd pain, dizziness, fever and calf pain. Discussed the importance of adhering to a healthy diet inclusive of a high fiber/protein diet and regularly exercising to aid in further weight loss. Patient also states that she has discontinued Mounjaro (prescribed by Dr. Metz) as she has begun IVF treatment. She has no other concerns at this time. will meet the nutritionist today,and will follow up in 3 months.

## 2024-03-19 NOTE — HISTORY OF PRESENT ILLNESS
[de-identified] : Pt is a 45 y/o F almost 7 months s/p RYGB who presents today for a follow up. BMI today is 29 and has lost almost 85 lbs since sx. Pt is doing really well and denies n/v/c/d/ SOB, acid reflux, po intolerance, chest pain, abd pain, dizziness, fever and calf pain. Discussed the importance of adhering to a healthy diet inclusive of a high fiber/protein diet and regularly exercising to aid in further weight loss. Patient also states that she has discontinued Mounjaro (prescribed by Dr. Metz) as she has begun IVF treatment. She has no other concerns at this time. will meet the nutritionist today,and will follow up in 3 months.

## 2024-03-25 ENCOUNTER — TRANSCRIPTION ENCOUNTER (OUTPATIENT)
Age: 44
End: 2024-03-25

## 2024-03-28 ENCOUNTER — APPOINTMENT (OUTPATIENT)
Dept: GYNECOLOGIC ONCOLOGY | Facility: CLINIC | Age: 44
End: 2024-03-28
Payer: COMMERCIAL

## 2024-03-28 VITALS
OXYGEN SATURATION: 94 % | BODY MASS INDEX: 36.25 KG/M2 | WEIGHT: 197 LBS | HEIGHT: 62 IN | HEART RATE: 65 BPM | TEMPERATURE: 97.4 F | SYSTOLIC BLOOD PRESSURE: 122 MMHG | DIASTOLIC BLOOD PRESSURE: 79 MMHG

## 2024-03-28 DIAGNOSIS — Z12.4 ENCOUNTER FOR SCREENING FOR MALIGNANT NEOPLASM OF CERVIX: ICD-10-CM

## 2024-03-28 PROCEDURE — 99213 OFFICE O/P EST LOW 20 MIN: CPT

## 2024-03-28 NOTE — HISTORY OF PRESENT ILLNESS
[FreeTextEntry1] : Problem 1)Complex atypical hyperplasia  2) Genetic VUS downgraded to benign, normal genetic testing 3) IUD Mirena in place 7/2020 (megace HELD)  Previous Therapy 1)EMC 3/26/19  a)Endometrial polyp curettage- Fragments of endometrium with complex atypical hyperplasia  b)EMC- Fragments of endometrium with complex atypical hyperplasia  2) Mirena IUD 5/3/19 3) Pelvic US 6/20/19   a) IUD is identified within the endometrium. endometrium appears markedly thickened 4) EMB 8/28/2019  a) Rare foci of complex atypical hyperplasia in a background of exogenous hormone-related change. 5) EMB 11/22/2019   a) Endometrium with foci of complex atypical hyperplasia in a background of exogenous hormone related changes.  6) EMB 2/27/2020  a) CAH 7) Pelvic US 7/7/20  a) uterus 8.4cm  b) endometrium 0.8cm , prominent echogenicity along its superior extent  c) IUD low in position  8) D&C hysteroscopy 7/27/20, removal of IUD and replacement of Mirena IUD  a) Uterus, intrauterine device; removal: -Intrauterine device-IUD (gross examination only).  b) Endometrium, curettings: - Endometrium with foci of complex atypical hyperplasia - Foci consistent with portions of benign endometrial polyp - Extensive areas consistent with progestin therapy effect - Acute and chronic endometritis - see note  8) S/P EMB 1/8/2021   a) Endometrial curettings/ biopsy:  - Inactive endometrium with pseudodecidualized stroma, compatible with exogenous hormonal effect. No    hyperplasia or atypia is seen 9) S/P EMB 7/20/21    a) - Superficial fragments of endometrium with marked stromal decidualization suggestive of exogenous hormonal administration       -  No endometrial hyperplasia or atypia seen 10) 10/29/21- EMB-- Fragments of inactive endometrium with marked pseudodecidualization and inflammation.   - Benign endocervical tissue.   - Fibrin and inflammatory exudate.   - Negative for hyperplasia. 11) EMB 2/2022 - Endometrium with marked stromal pseudodecidualization consistent  with exogenous hormone-related change 12) EMB 6/2022     a) Primarily fibrinoid debris with multiple chronic inflammatory cells     - Superficial fragments of endometrium with stromal decidualization consistent with exogenous hormone therapy     - Patchy stromal chronic inflammation with rare plasma cell and  occasional neutrophiles     - No atypical hyperplasia seen 13) Embx 4/24/23     a) - Endometrium with exogenous hormone-related change  14) S/P Robotic Annamarie-en-Y gastric bypass and hiatal hernia repair with mesh and esophagogastroduodenoscopy. 8/23/23

## 2024-03-28 NOTE — REASON FOR VISIT
[FreeTextEntry1] : Follow-up  Pt is here for PAP. Last PAP was in 2020. Pt needs a PAP for moving forward with Fertility planning. Pt without complaints.   Mammogram/US: 4/2023 negative  Colonoscopy: N/A

## 2024-03-28 NOTE — ASSESSMENT
[FreeTextEntry1] : Normal examination.   [] PAP cotesting today [] F/U in 6 months with Dr. Angel from last visit.

## 2024-03-28 NOTE — PHYSICAL EXAM
Anesthesia Pre Eval Note    Anesthesia ROS/Med Hx          Pulmonary Review:    Positive for asthma, well controlled    Neuro/Psych Review:       Positive for psychiatric history - Depression and Anxiety    GI/HEPATIC/RENAL Review:     Positive for GERD    End/Other Review:    Positive for hypothyroidism  Positive for chronic pain      Relevant Problems   No relevant active problems       Physical Exam     Airway   Mallampati: I  TM Distance: >3 FB  Neck ROM: Full  Neck: Non-tender and Able to place in sniff position  TMJ Mobility: Good    Cardiovascular  Cardiovascular exam normal  Cardio Rhythm: Regular  Cardio Rate: Normal    Head Assessment  Head assessment: Normocephalic and Atraumatic    General Assessment  General Assessment: Alert and oriented and No acute distress    Dental Exam  Dental exam normal  Patient has:  Denied broken/chipped/loose teeth    Pulmonary Exam  Pulmonary exam normal  Breath sounds clear to auscultation:  Yes    Abdominal Exam  Abdominal exam normal      Anesthesia Plan:    ASA Status: 2  Anesthesia Type: MAC    Induction: Intravenous  Maintenance: TIVA    Post-op Pain Management: Per Surgeon      Checklist  Reviewed: NPO Status, Allergies, Medications, Problem list, Past Med History, Patient Summary and Lab Results  Consent/Risks Discussed Statement:  The proposed anesthetic plan, including its risks and benefits, have been discussed with the Patient along with the risks and benefits of alternatives. Questions were encouraged and answered and the patient and/or representative understands and agrees to proceed.    I have discussed elements of the patient's history or examination, as noted above and/or as follows, that place the patient at higher risk of complications; age and pulmonary disease.    I discussed with the patient (and/or patient's legal representative) the risks and benefits of the proposed anesthesia plan, MAC, which may include services performed by other anesthesia  providers.    Alternative anesthesia plans, if available, were reviewed with the patient (and/or patient's legal representative). Discussion has been held with the patient (and/or patient's legal representative) regarding risks of anesthesia, which include Intra-operative Awareness, allergic reaction, hypotension and depressed breathing and emergent situations that may require change in anesthesia plan.    The patient (and/or patient's legal representative) has indicated understanding, his/her questions have been answered, and he/she wishes to proceed with the planned anesthetic.    Informed Consent for Blood: Consented  Blood Products: Not Anticipated     [Chaperone Present] : A chaperone was present in the examining room during all aspects of the physical examination [FreeTextEntry1] : MA Desire [Normal] : Bimanual Exam: Normal [de-identified] : IUD strings noted.  [Fully active, able to carry on all pre-disease performance without restriction] : Status 0 - Fully active, able to carry on all pre-disease performance without restriction

## 2024-04-01 LAB — HPV HIGH+LOW RISK DNA PNL CVX: NOT DETECTED

## 2024-04-02 ENCOUNTER — TRANSCRIPTION ENCOUNTER (OUTPATIENT)
Age: 44
End: 2024-04-02

## 2024-04-02 LAB — CYTOLOGY CVX/VAG DOC THIN PREP: NORMAL

## 2024-04-08 ENCOUNTER — APPOINTMENT (OUTPATIENT)
Dept: HUMAN REPRODUCTION | Facility: CLINIC | Age: 44
End: 2024-04-08
Payer: COMMERCIAL

## 2024-04-08 ENCOUNTER — APPOINTMENT (OUTPATIENT)
Dept: HUMAN REPRODUCTION | Facility: CLINIC | Age: 44
End: 2024-04-08

## 2024-04-08 PROCEDURE — 84144 ASSAY OF PROGESTERONE: CPT | Mod: NC

## 2024-04-08 PROCEDURE — 83001 ASSAY OF GONADOTROPIN (FSH): CPT | Mod: NC,QW

## 2024-04-08 PROCEDURE — 83002 ASSAY OF GONADOTROPIN (LH): CPT | Mod: NC,QW

## 2024-04-08 PROCEDURE — S4042: CPT | Mod: NC

## 2024-04-08 PROCEDURE — 82670 ASSAY OF TOTAL ESTRADIOL: CPT | Mod: NC

## 2024-04-08 PROCEDURE — 99459 PELVIC EXAMINATION: CPT | Mod: NC

## 2024-04-08 PROCEDURE — 99213 OFFICE O/P EST LOW 20 MIN: CPT | Mod: NC

## 2024-04-08 PROCEDURE — 84702 CHORIONIC GONADOTROPIN TEST: CPT | Mod: NC

## 2024-04-08 PROCEDURE — 36415 COLL VENOUS BLD VENIPUNCTURE: CPT | Mod: NC

## 2024-04-08 PROCEDURE — 76830 TRANSVAGINAL US NON-OB: CPT | Mod: NC

## 2024-04-15 ENCOUNTER — APPOINTMENT (OUTPATIENT)
Dept: HUMAN REPRODUCTION | Facility: CLINIC | Age: 44
End: 2024-04-15
Payer: COMMERCIAL

## 2024-04-15 PROCEDURE — 82670 ASSAY OF TOTAL ESTRADIOL: CPT | Mod: NC

## 2024-04-15 PROCEDURE — 76857 US EXAM PELVIC LIMITED: CPT | Mod: NC

## 2024-04-15 PROCEDURE — 83002 ASSAY OF GONADOTROPIN (LH): CPT | Mod: NC,QW

## 2024-04-15 PROCEDURE — 84144 ASSAY OF PROGESTERONE: CPT | Mod: NC

## 2024-04-15 PROCEDURE — 99213 OFFICE O/P EST LOW 20 MIN: CPT | Mod: NC

## 2024-04-15 PROCEDURE — 36415 COLL VENOUS BLD VENIPUNCTURE: CPT | Mod: NC

## 2024-04-15 PROCEDURE — 99459 PELVIC EXAMINATION: CPT | Mod: NC

## 2024-04-17 ENCOUNTER — APPOINTMENT (OUTPATIENT)
Dept: HUMAN REPRODUCTION | Facility: CLINIC | Age: 44
End: 2024-04-17
Payer: COMMERCIAL

## 2024-04-17 PROCEDURE — 99459 PELVIC EXAMINATION: CPT | Mod: NC

## 2024-04-17 PROCEDURE — 84144 ASSAY OF PROGESTERONE: CPT | Mod: NC

## 2024-04-17 PROCEDURE — 83002 ASSAY OF GONADOTROPIN (LH): CPT | Mod: NC,QW

## 2024-04-17 PROCEDURE — 36415 COLL VENOUS BLD VENIPUNCTURE: CPT | Mod: NC

## 2024-04-17 PROCEDURE — 99213 OFFICE O/P EST LOW 20 MIN: CPT | Mod: NC

## 2024-04-17 PROCEDURE — 82670 ASSAY OF TOTAL ESTRADIOL: CPT | Mod: NC

## 2024-04-17 PROCEDURE — 76857 US EXAM PELVIC LIMITED: CPT | Mod: NC

## 2024-04-19 ENCOUNTER — APPOINTMENT (OUTPATIENT)
Dept: HUMAN REPRODUCTION | Facility: CLINIC | Age: 44
End: 2024-04-19
Payer: COMMERCIAL

## 2024-04-19 PROCEDURE — 84144 ASSAY OF PROGESTERONE: CPT | Mod: NC

## 2024-04-19 PROCEDURE — 82670 ASSAY OF TOTAL ESTRADIOL: CPT | Mod: NC

## 2024-04-19 PROCEDURE — 99459 PELVIC EXAMINATION: CPT | Mod: NC

## 2024-04-19 PROCEDURE — 99213 OFFICE O/P EST LOW 20 MIN: CPT | Mod: NC

## 2024-04-19 PROCEDURE — ZZZZZ: CPT

## 2024-04-19 PROCEDURE — 36415 COLL VENOUS BLD VENIPUNCTURE: CPT | Mod: NC

## 2024-04-19 PROCEDURE — 76857 US EXAM PELVIC LIMITED: CPT | Mod: NC

## 2024-04-19 PROCEDURE — 83002 ASSAY OF GONADOTROPIN (LH): CPT | Mod: NC,QW

## 2024-04-21 ENCOUNTER — APPOINTMENT (OUTPATIENT)
Dept: HUMAN REPRODUCTION | Facility: CLINIC | Age: 44
End: 2024-04-21
Payer: COMMERCIAL

## 2024-04-21 PROCEDURE — 99213 OFFICE O/P EST LOW 20 MIN: CPT | Mod: NC

## 2024-04-21 PROCEDURE — 76857 US EXAM PELVIC LIMITED: CPT | Mod: NC

## 2024-04-21 PROCEDURE — 36415 COLL VENOUS BLD VENIPUNCTURE: CPT | Mod: NC

## 2024-04-22 ENCOUNTER — APPOINTMENT (OUTPATIENT)
Dept: HUMAN REPRODUCTION | Facility: CLINIC | Age: 44
End: 2024-04-22
Payer: COMMERCIAL

## 2024-04-22 PROCEDURE — 76857 US EXAM PELVIC LIMITED: CPT | Mod: NC

## 2024-04-22 PROCEDURE — 99459 PELVIC EXAMINATION: CPT | Mod: NC

## 2024-04-22 PROCEDURE — 99213 OFFICE O/P EST LOW 20 MIN: CPT | Mod: NC

## 2024-04-22 PROCEDURE — 82670 ASSAY OF TOTAL ESTRADIOL: CPT | Mod: NC

## 2024-04-22 PROCEDURE — 83002 ASSAY OF GONADOTROPIN (LH): CPT | Mod: NC,QW

## 2024-04-22 PROCEDURE — 36415 COLL VENOUS BLD VENIPUNCTURE: CPT | Mod: NC

## 2024-04-22 PROCEDURE — 84144 ASSAY OF PROGESTERONE: CPT | Mod: NC

## 2024-04-23 ENCOUNTER — APPOINTMENT (OUTPATIENT)
Dept: HUMAN REPRODUCTION | Facility: CLINIC | Age: 44
End: 2024-04-23
Payer: COMMERCIAL

## 2024-04-23 PROCEDURE — 83002 ASSAY OF GONADOTROPIN (LH): CPT | Mod: NC,QW

## 2024-04-23 PROCEDURE — 76857 US EXAM PELVIC LIMITED: CPT | Mod: NC

## 2024-04-23 PROCEDURE — 99213 OFFICE O/P EST LOW 20 MIN: CPT | Mod: NC

## 2024-04-23 PROCEDURE — 36415 COLL VENOUS BLD VENIPUNCTURE: CPT | Mod: NC

## 2024-04-23 PROCEDURE — 84144 ASSAY OF PROGESTERONE: CPT | Mod: NC

## 2024-04-23 PROCEDURE — 82670 ASSAY OF TOTAL ESTRADIOL: CPT | Mod: NC

## 2024-04-23 PROCEDURE — 99459 PELVIC EXAMINATION: CPT | Mod: NC

## 2024-04-24 ENCOUNTER — APPOINTMENT (OUTPATIENT)
Dept: HUMAN REPRODUCTION | Facility: CLINIC | Age: 44
End: 2024-04-24
Payer: COMMERCIAL

## 2024-04-24 PROCEDURE — 76857 US EXAM PELVIC LIMITED: CPT | Mod: NC

## 2024-04-24 PROCEDURE — 99459 PELVIC EXAMINATION: CPT | Mod: NC

## 2024-04-24 PROCEDURE — 83002 ASSAY OF GONADOTROPIN (LH): CPT | Mod: NC,QW

## 2024-04-24 PROCEDURE — 82670 ASSAY OF TOTAL ESTRADIOL: CPT | Mod: NC

## 2024-04-24 PROCEDURE — 99213 OFFICE O/P EST LOW 20 MIN: CPT | Mod: NC

## 2024-04-24 PROCEDURE — 84144 ASSAY OF PROGESTERONE: CPT | Mod: NC

## 2024-04-24 PROCEDURE — 36415 COLL VENOUS BLD VENIPUNCTURE: CPT | Mod: NC

## 2024-04-25 ENCOUNTER — APPOINTMENT (OUTPATIENT)
Dept: HUMAN REPRODUCTION | Facility: CLINIC | Age: 44
End: 2024-04-25
Payer: COMMERCIAL

## 2024-04-25 PROCEDURE — 36415 COLL VENOUS BLD VENIPUNCTURE: CPT | Mod: NC

## 2024-04-25 PROCEDURE — 84702 CHORIONIC GONADOTROPIN TEST: CPT | Mod: NC

## 2024-04-25 PROCEDURE — 82670 ASSAY OF TOTAL ESTRADIOL: CPT | Mod: NC

## 2024-04-25 PROCEDURE — 84144 ASSAY OF PROGESTERONE: CPT | Mod: NC

## 2024-04-25 PROCEDURE — 83002 ASSAY OF GONADOTROPIN (LH): CPT | Mod: NC,QW

## 2024-04-26 ENCOUNTER — APPOINTMENT (OUTPATIENT)
Dept: HUMAN REPRODUCTION | Facility: CLINIC | Age: 44
End: 2024-04-26
Payer: COMMERCIAL

## 2024-04-26 PROCEDURE — 76948 ECHO GUIDE OVA ASPIRATION: CPT | Mod: NC

## 2024-04-26 PROCEDURE — S4021P22: CUSTOM | Mod: 22

## 2024-04-26 PROCEDURE — 58970 RETRIEVAL OF OOCYTE: CPT | Mod: NC

## 2024-04-26 PROCEDURE — 89281P: CUSTOM

## 2024-04-27 ENCOUNTER — APPOINTMENT (OUTPATIENT)
Dept: HUMAN REPRODUCTION | Facility: CLINIC | Age: 44
End: 2024-04-27
Payer: COMMERCIAL

## 2024-05-07 ENCOUNTER — APPOINTMENT (OUTPATIENT)
Dept: GYNECOLOGIC ONCOLOGY | Facility: CLINIC | Age: 44
End: 2024-05-07
Payer: COMMERCIAL

## 2024-05-07 VITALS
HEIGHT: 62 IN | TEMPERATURE: 97.9 F | WEIGHT: 187 LBS | SYSTOLIC BLOOD PRESSURE: 112 MMHG | OXYGEN SATURATION: 96 % | BODY MASS INDEX: 34.41 KG/M2 | HEART RATE: 66 BPM | DIASTOLIC BLOOD PRESSURE: 75 MMHG

## 2024-05-07 DIAGNOSIS — N85.02 ENDOMETRIAL INTRAEPITHELIAL NEOPLASIA [EIN]: ICD-10-CM

## 2024-05-07 LAB
HCG UR QL: NEGATIVE
QUALITY CONTROL: YES

## 2024-05-07 PROCEDURE — 99215 OFFICE O/P EST HI 40 MIN: CPT | Mod: 25

## 2024-05-07 PROCEDURE — 58100 BIOPSY OF UTERUS LINING: CPT

## 2024-05-07 PROCEDURE — 99459 PELVIC EXAMINATION: CPT

## 2024-05-07 PROCEDURE — 81025 URINE PREGNANCY TEST: CPT

## 2024-05-07 NOTE — PHYSICAL EXAM
[Chaperone Present] : A chaperone was present in the examining room during all aspects of the physical examination [[id number]] : A chaperone was present during the pelvic exam. [Normal] : Recto-Vaginal Exam: Normal [de-identified] : morbid obesity, nontender [de-identified] : IUD strings visualized. Nontender

## 2024-05-07 NOTE — REASON FOR VISIT
[FreeTextEntry1] : Follow-up  45yo here for follow-up. Moving forward with [facility], desires children. Pt reports s/p 1st cycle of egg retrieval produced 9 eggs, however, did not make it through the process. Otherwise pt has no complaints.  Mammogram/US: 4/2023 negative  Colonoscopy: N/A PAP/HPV: 3/28/24 negative

## 2024-05-07 NOTE — PROCEDURE
[Endometrial Biopsy] : an endometrial biopsy [Other: ___] : [unfilled] [Yes] : the specimen was sent to pathology [No Complications] : none [Tolerated Well] : the patient tolerated the procedure well [FreeTextEntry1] : Pipelle introduced to 7 cm. Two passes. Adequate tissue

## 2024-05-07 NOTE — ASSESSMENT
[FreeTextEntry1] : Patient doing well with no new complaints.  Will continue management of hyperplasia with IUD for now. She is now DEBI since 7/2021. I think it is reasonable to space evaluation of the endometrium to annually.   She will continue with Dr. Pride. IUD will need to be removed prior to embryo implantation by 7/2025.  [] EMBx [] F/U in 1 year

## 2024-05-09 ENCOUNTER — NON-APPOINTMENT (OUTPATIENT)
Age: 44
End: 2024-05-09

## 2024-05-09 LAB — CORE LAB BIOPSY: NORMAL

## 2024-05-16 ENCOUNTER — APPOINTMENT (OUTPATIENT)
Dept: HUMAN REPRODUCTION | Facility: CLINIC | Age: 44
End: 2024-05-16
Payer: COMMERCIAL

## 2024-05-16 PROCEDURE — 99214 OFFICE O/P EST MOD 30 MIN: CPT | Mod: NC

## 2024-05-20 ENCOUNTER — APPOINTMENT (OUTPATIENT)
Dept: HUMAN REPRODUCTION | Facility: CLINIC | Age: 44
End: 2024-05-20
Payer: COMMERCIAL

## 2024-05-20 PROCEDURE — 83002 ASSAY OF GONADOTROPIN (LH): CPT | Mod: NC,QW

## 2024-05-20 PROCEDURE — 99213 OFFICE O/P EST LOW 20 MIN: CPT | Mod: NC

## 2024-05-20 PROCEDURE — 99459 PELVIC EXAMINATION: CPT | Mod: NC

## 2024-05-20 PROCEDURE — 83001 ASSAY OF GONADOTROPIN (FSH): CPT | Mod: NC,QW

## 2024-05-20 PROCEDURE — 36415 COLL VENOUS BLD VENIPUNCTURE: CPT | Mod: NC

## 2024-05-20 PROCEDURE — 76857 US EXAM PELVIC LIMITED: CPT | Mod: NC

## 2024-05-20 PROCEDURE — 82670 ASSAY OF TOTAL ESTRADIOL: CPT | Mod: NC

## 2024-05-20 PROCEDURE — 84702 CHORIONIC GONADOTROPIN TEST: CPT | Mod: NC

## 2024-05-20 PROCEDURE — 84144 ASSAY OF PROGESTERONE: CPT | Mod: NC

## 2024-05-30 ENCOUNTER — APPOINTMENT (OUTPATIENT)
Dept: HUMAN REPRODUCTION | Facility: CLINIC | Age: 44
End: 2024-05-30
Payer: COMMERCIAL

## 2024-05-30 PROCEDURE — 83002 ASSAY OF GONADOTROPIN (LH): CPT | Mod: NC,QW

## 2024-05-30 PROCEDURE — 84702 CHORIONIC GONADOTROPIN TEST: CPT | Mod: NC

## 2024-05-30 PROCEDURE — 82670 ASSAY OF TOTAL ESTRADIOL: CPT | Mod: NC

## 2024-05-30 PROCEDURE — 99213 OFFICE O/P EST LOW 20 MIN: CPT | Mod: NC

## 2024-05-30 PROCEDURE — 36415 COLL VENOUS BLD VENIPUNCTURE: CPT | Mod: NC

## 2024-05-30 PROCEDURE — 99459 PELVIC EXAMINATION: CPT | Mod: NC

## 2024-05-30 PROCEDURE — 84144 ASSAY OF PROGESTERONE: CPT | Mod: NC

## 2024-05-30 PROCEDURE — 76857 US EXAM PELVIC LIMITED: CPT | Mod: NC

## 2024-06-07 ENCOUNTER — APPOINTMENT (OUTPATIENT)
Dept: HUMAN REPRODUCTION | Facility: CLINIC | Age: 44
End: 2024-06-07
Payer: COMMERCIAL

## 2024-06-07 PROCEDURE — 76857 US EXAM PELVIC LIMITED: CPT | Mod: NC

## 2024-06-07 PROCEDURE — 99459 PELVIC EXAMINATION: CPT | Mod: NC

## 2024-06-07 PROCEDURE — 83001 ASSAY OF GONADOTROPIN (FSH): CPT | Mod: NC,QW

## 2024-06-07 PROCEDURE — 99213 OFFICE O/P EST LOW 20 MIN: CPT | Mod: NC

## 2024-06-07 PROCEDURE — 83002 ASSAY OF GONADOTROPIN (LH): CPT | Mod: NC,QW

## 2024-06-07 PROCEDURE — 84702 CHORIONIC GONADOTROPIN TEST: CPT | Mod: NC

## 2024-06-07 PROCEDURE — 82670 ASSAY OF TOTAL ESTRADIOL: CPT | Mod: NC

## 2024-06-07 PROCEDURE — 84144 ASSAY OF PROGESTERONE: CPT | Mod: NC

## 2024-06-07 PROCEDURE — 36415 COLL VENOUS BLD VENIPUNCTURE: CPT | Mod: NC

## 2024-06-10 ENCOUNTER — APPOINTMENT (OUTPATIENT)
Dept: HUMAN REPRODUCTION | Facility: CLINIC | Age: 44
End: 2024-06-10
Payer: COMMERCIAL

## 2024-06-10 PROCEDURE — 84144 ASSAY OF PROGESTERONE: CPT | Mod: NC

## 2024-06-10 PROCEDURE — 76857 US EXAM PELVIC LIMITED: CPT | Mod: NC

## 2024-06-10 PROCEDURE — 36415 COLL VENOUS BLD VENIPUNCTURE: CPT | Mod: NC

## 2024-06-10 PROCEDURE — 99459 PELVIC EXAMINATION: CPT | Mod: NC

## 2024-06-10 PROCEDURE — 83002 ASSAY OF GONADOTROPIN (LH): CPT | Mod: NC,QW

## 2024-06-10 PROCEDURE — 82670 ASSAY OF TOTAL ESTRADIOL: CPT | Mod: NC

## 2024-06-10 PROCEDURE — 99213 OFFICE O/P EST LOW 20 MIN: CPT | Mod: NC

## 2024-06-12 ENCOUNTER — APPOINTMENT (OUTPATIENT)
Dept: HUMAN REPRODUCTION | Facility: CLINIC | Age: 44
End: 2024-06-12
Payer: COMMERCIAL

## 2024-06-12 PROCEDURE — 36415 COLL VENOUS BLD VENIPUNCTURE: CPT | Mod: NC

## 2024-06-12 PROCEDURE — 82670 ASSAY OF TOTAL ESTRADIOL: CPT | Mod: NC

## 2024-06-12 PROCEDURE — 99213 OFFICE O/P EST LOW 20 MIN: CPT | Mod: NC

## 2024-06-12 PROCEDURE — 83002 ASSAY OF GONADOTROPIN (LH): CPT | Mod: NC,QW

## 2024-06-12 PROCEDURE — 99459 PELVIC EXAMINATION: CPT | Mod: NC

## 2024-06-12 PROCEDURE — 84144 ASSAY OF PROGESTERONE: CPT | Mod: NC

## 2024-06-12 PROCEDURE — 76857 US EXAM PELVIC LIMITED: CPT | Mod: NC

## 2024-06-13 ENCOUNTER — APPOINTMENT (OUTPATIENT)
Dept: BARIATRICS | Facility: CLINIC | Age: 44
End: 2024-06-13
Payer: COMMERCIAL

## 2024-06-13 VITALS
SYSTOLIC BLOOD PRESSURE: 124 MMHG | OXYGEN SATURATION: 99 % | TEMPERATURE: 97.2 F | HEART RATE: 57 BPM | HEIGHT: 62 IN | DIASTOLIC BLOOD PRESSURE: 82 MMHG | WEIGHT: 189 LBS | BODY MASS INDEX: 34.78 KG/M2

## 2024-06-13 DIAGNOSIS — Z00.00 ENCOUNTER FOR GENERAL ADULT MEDICAL EXAMINATION W/OUT ABNORMAL FINDINGS: ICD-10-CM

## 2024-06-13 DIAGNOSIS — Z98.84 BARIATRIC SURGERY STATUS: ICD-10-CM

## 2024-06-13 PROCEDURE — 99214 OFFICE O/P EST MOD 30 MIN: CPT

## 2024-06-14 ENCOUNTER — APPOINTMENT (OUTPATIENT)
Dept: HUMAN REPRODUCTION | Facility: CLINIC | Age: 44
End: 2024-06-14
Payer: COMMERCIAL

## 2024-06-14 PROCEDURE — 76857 US EXAM PELVIC LIMITED: CPT | Mod: NC

## 2024-06-14 PROCEDURE — 36415 COLL VENOUS BLD VENIPUNCTURE: CPT | Mod: NC

## 2024-06-14 PROCEDURE — 84144 ASSAY OF PROGESTERONE: CPT | Mod: NC

## 2024-06-14 PROCEDURE — 99459 PELVIC EXAMINATION: CPT | Mod: NC

## 2024-06-14 PROCEDURE — 82670 ASSAY OF TOTAL ESTRADIOL: CPT | Mod: NC

## 2024-06-14 PROCEDURE — 83002 ASSAY OF GONADOTROPIN (LH): CPT | Mod: NC,QW

## 2024-06-14 PROCEDURE — 99213 OFFICE O/P EST LOW 20 MIN: CPT | Mod: NC

## 2024-06-14 NOTE — PLAN
[FreeTextEntry1] : - labs - nutritionist today -pt understands we do not recommend becoming pregnant for 18 mo post op - f/u in 3 months

## 2024-06-14 NOTE — ASSESSMENT
[FreeTextEntry1] : Patient is 45 y/o F 10 months s/p RYGB who presents today for a follow up. BMI today is 34 and patient has lost almost 100 lbs since her surgery. Patient is doing well and denies n/v/c/d/ SOB, acid reflux, po intolerance, chest pain, abd pain, dizziness, fever and calf pain. Patient states that she is currently in the early stages of IVF and has been advised by her OB/GYN to refrain from extraneous exercises. Compliant with vitamins and has no other concerns at this time. Will meet nutritionist today, obtain bloodwork, and follow up in 3 months.

## 2024-06-14 NOTE — END OF VISIT
[Time Spent: ___ minutes] : I have spent [unfilled] minutes of time on the encounter. [FreeTextEntry3] :  All medical record entries made by the Scribe were at my, JC Harrington , direction and personally dictated by me on 06/13/2024 . I have reviewed the chart and agree that the record accurately reflects my personal performance of the history, physical exam, assessment and plan. I have also personally directed, reviewed, and agreed with the chart.

## 2024-06-14 NOTE — HISTORY OF PRESENT ILLNESS
[de-identified] : Patient is 43 y/o F 10 months s/p RYGB who presents today for a follow up. BMI today is 34 and patient has lost almost 100 lbs since her surgery. Patient is doing well and denies n/v/c/d/ SOB, acid reflux, po intolerance, chest pain, abd pain, dizziness, fever and calf pain. Patient states that she is currently in the early stages of IVF and has been advised by her OB/GYN to refrain from extraneous exercises. Compliant with vitamins and has no other concerns at this time. Will meet nutritionist today, obtain bloodwork, and follow up in 3 months.

## 2024-06-16 ENCOUNTER — APPOINTMENT (OUTPATIENT)
Dept: HUMAN REPRODUCTION | Facility: CLINIC | Age: 44
End: 2024-06-16
Payer: COMMERCIAL

## 2024-06-16 PROCEDURE — 99213 OFFICE O/P EST LOW 20 MIN: CPT | Mod: NC

## 2024-06-16 PROCEDURE — 76857 US EXAM PELVIC LIMITED: CPT | Mod: NC

## 2024-06-16 PROCEDURE — 36415 COLL VENOUS BLD VENIPUNCTURE: CPT | Mod: NC

## 2024-06-17 ENCOUNTER — APPOINTMENT (OUTPATIENT)
Dept: HUMAN REPRODUCTION | Facility: CLINIC | Age: 44
End: 2024-06-17
Payer: COMMERCIAL

## 2024-06-17 PROCEDURE — 82670 ASSAY OF TOTAL ESTRADIOL: CPT | Mod: NC

## 2024-06-17 PROCEDURE — 99213 OFFICE O/P EST LOW 20 MIN: CPT | Mod: NC

## 2024-06-17 PROCEDURE — 99459 PELVIC EXAMINATION: CPT | Mod: NC

## 2024-06-17 PROCEDURE — 83002 ASSAY OF GONADOTROPIN (LH): CPT | Mod: NC,QW

## 2024-06-17 PROCEDURE — 36415 COLL VENOUS BLD VENIPUNCTURE: CPT | Mod: NC

## 2024-06-17 PROCEDURE — 76857 US EXAM PELVIC LIMITED: CPT | Mod: NC

## 2024-06-17 PROCEDURE — 84144 ASSAY OF PROGESTERONE: CPT | Mod: NC

## 2024-06-18 ENCOUNTER — APPOINTMENT (OUTPATIENT)
Dept: HUMAN REPRODUCTION | Facility: CLINIC | Age: 44
End: 2024-06-18
Payer: COMMERCIAL

## 2024-06-18 PROCEDURE — 36415 COLL VENOUS BLD VENIPUNCTURE: CPT | Mod: NC

## 2024-06-18 PROCEDURE — 76857 US EXAM PELVIC LIMITED: CPT | Mod: NC

## 2024-06-18 PROCEDURE — 83002 ASSAY OF GONADOTROPIN (LH): CPT | Mod: NC,QW

## 2024-06-18 PROCEDURE — 84144 ASSAY OF PROGESTERONE: CPT | Mod: NC

## 2024-06-18 PROCEDURE — 82670 ASSAY OF TOTAL ESTRADIOL: CPT | Mod: NC

## 2024-06-18 PROCEDURE — 99213 OFFICE O/P EST LOW 20 MIN: CPT | Mod: NC

## 2024-06-19 ENCOUNTER — APPOINTMENT (OUTPATIENT)
Dept: HUMAN REPRODUCTION | Facility: CLINIC | Age: 44
End: 2024-06-19
Payer: COMMERCIAL

## 2024-06-19 PROCEDURE — 83002 ASSAY OF GONADOTROPIN (LH): CPT | Mod: NC,QW

## 2024-06-19 PROCEDURE — 99459 PELVIC EXAMINATION: CPT | Mod: NC

## 2024-06-19 PROCEDURE — 76857 US EXAM PELVIC LIMITED: CPT | Mod: NC

## 2024-06-19 PROCEDURE — 99213 OFFICE O/P EST LOW 20 MIN: CPT | Mod: NC

## 2024-06-19 PROCEDURE — 84144 ASSAY OF PROGESTERONE: CPT | Mod: NC

## 2024-06-19 PROCEDURE — 82670 ASSAY OF TOTAL ESTRADIOL: CPT | Mod: NC

## 2024-06-19 PROCEDURE — 36415 COLL VENOUS BLD VENIPUNCTURE: CPT | Mod: NC

## 2024-06-20 ENCOUNTER — APPOINTMENT (OUTPATIENT)
Dept: HUMAN REPRODUCTION | Facility: CLINIC | Age: 44
End: 2024-06-20
Payer: COMMERCIAL

## 2024-06-20 PROCEDURE — 83002 ASSAY OF GONADOTROPIN (LH): CPT | Mod: NC,QW

## 2024-06-20 PROCEDURE — 99459 PELVIC EXAMINATION: CPT | Mod: NC

## 2024-06-20 PROCEDURE — 82670 ASSAY OF TOTAL ESTRADIOL: CPT | Mod: NC

## 2024-06-20 PROCEDURE — 36415 COLL VENOUS BLD VENIPUNCTURE: CPT | Mod: NC

## 2024-06-20 PROCEDURE — 99213 OFFICE O/P EST LOW 20 MIN: CPT | Mod: NC

## 2024-06-20 PROCEDURE — 76857 US EXAM PELVIC LIMITED: CPT | Mod: NC

## 2024-06-20 PROCEDURE — 84144 ASSAY OF PROGESTERONE: CPT | Mod: NC

## 2024-06-21 ENCOUNTER — APPOINTMENT (OUTPATIENT)
Dept: HUMAN REPRODUCTION | Facility: CLINIC | Age: 44
End: 2024-06-21
Payer: COMMERCIAL

## 2024-06-21 PROCEDURE — 36415 COLL VENOUS BLD VENIPUNCTURE: CPT | Mod: NC

## 2024-06-21 PROCEDURE — 82670 ASSAY OF TOTAL ESTRADIOL: CPT | Mod: NC

## 2024-06-21 PROCEDURE — 99213 OFFICE O/P EST LOW 20 MIN: CPT | Mod: NC

## 2024-06-21 PROCEDURE — 76857 US EXAM PELVIC LIMITED: CPT | Mod: NC

## 2024-06-21 PROCEDURE — 84144 ASSAY OF PROGESTERONE: CPT | Mod: NC

## 2024-06-21 PROCEDURE — 83002 ASSAY OF GONADOTROPIN (LH): CPT | Mod: NC,QW

## 2024-06-23 ENCOUNTER — APPOINTMENT (OUTPATIENT)
Dept: HUMAN REPRODUCTION | Facility: CLINIC | Age: 44
End: 2024-06-23
Payer: COMMERCIAL

## 2024-06-23 ENCOUNTER — INPATIENT (INPATIENT)
Facility: HOSPITAL | Age: 44
LOS: 0 days | Discharge: HOME | DRG: 921 | End: 2024-06-24
Attending: OBSTETRICS & GYNECOLOGY | Admitting: OBSTETRICS & GYNECOLOGY
Payer: COMMERCIAL

## 2024-06-23 VITALS
RESPIRATION RATE: 20 BRPM | OXYGEN SATURATION: 87 % | SYSTOLIC BLOOD PRESSURE: 141 MMHG | TEMPERATURE: 98 F | DIASTOLIC BLOOD PRESSURE: 85 MMHG | HEART RATE: 53 BPM

## 2024-06-23 DIAGNOSIS — Z41.9 ENCOUNTER FOR PROCEDURE FOR PURPOSES OTHER THAN REMEDYING HEALTH STATE, UNSPECIFIED: Chronic | ICD-10-CM

## 2024-06-23 DIAGNOSIS — Z98.84 BARIATRIC SURGERY STATUS: Chronic | ICD-10-CM

## 2024-06-23 DIAGNOSIS — Z98.890 OTHER SPECIFIED POSTPROCEDURAL STATES: Chronic | ICD-10-CM

## 2024-06-23 DIAGNOSIS — K08.409 PARTIAL LOSS OF TEETH, UNSPECIFIED CAUSE, UNSPECIFIED CLASS: Chronic | ICD-10-CM

## 2024-06-23 LAB
ANION GAP SERPL CALC-SCNC: 10 MMOL/L — SIGNIFICANT CHANGE UP (ref 5–17)
APTT BLD: 31.8 SEC — SIGNIFICANT CHANGE UP (ref 24.5–35.6)
BASOPHILS # BLD AUTO: 0.04 K/UL — SIGNIFICANT CHANGE UP (ref 0–0.2)
BASOPHILS NFR BLD AUTO: 0.3 % — SIGNIFICANT CHANGE UP (ref 0–2)
BLD GP AB SCN SERPL QL: NEGATIVE — SIGNIFICANT CHANGE UP
BUN SERPL-MCNC: 8 MG/DL — SIGNIFICANT CHANGE UP (ref 7–23)
CALCIUM SERPL-MCNC: 8.8 MG/DL — SIGNIFICANT CHANGE UP (ref 8.4–10.5)
CHLORIDE SERPL-SCNC: 104 MMOL/L — SIGNIFICANT CHANGE UP (ref 96–108)
CO2 SERPL-SCNC: 23 MMOL/L — SIGNIFICANT CHANGE UP (ref 22–31)
CREAT SERPL-MCNC: 0.59 MG/DL — SIGNIFICANT CHANGE UP (ref 0.5–1.3)
EGFR: 114 ML/MIN/1.73M2 — SIGNIFICANT CHANGE UP
EOSINOPHIL # BLD AUTO: 0.23 K/UL — SIGNIFICANT CHANGE UP (ref 0–0.5)
EOSINOPHIL NFR BLD AUTO: 1.8 % — SIGNIFICANT CHANGE UP (ref 0–6)
GLUCOSE SERPL-MCNC: 130 MG/DL — HIGH (ref 70–99)
HCT VFR BLD CALC: 38.7 % — SIGNIFICANT CHANGE UP (ref 34.5–45)
HCT VFR BLD CALC: 42.3 % — SIGNIFICANT CHANGE UP (ref 34.5–45)
HGB BLD-MCNC: 12.9 G/DL — SIGNIFICANT CHANGE UP (ref 11.5–15.5)
HGB BLD-MCNC: 13.7 G/DL — SIGNIFICANT CHANGE UP (ref 11.5–15.5)
IMM GRANULOCYTES NFR BLD AUTO: 0.4 % — SIGNIFICANT CHANGE UP (ref 0–0.9)
INR BLD: 0.93 — SIGNIFICANT CHANGE UP (ref 0.85–1.18)
LYMPHOCYTES # BLD AUTO: 27.8 % — SIGNIFICANT CHANGE UP (ref 13–44)
LYMPHOCYTES # BLD AUTO: 3.46 K/UL — HIGH (ref 1–3.3)
MCHC RBC-ENTMCNC: 30.6 PG — SIGNIFICANT CHANGE UP (ref 27–34)
MCHC RBC-ENTMCNC: 31 PG — SIGNIFICANT CHANGE UP (ref 27–34)
MCHC RBC-ENTMCNC: 32.4 GM/DL — SIGNIFICANT CHANGE UP (ref 32–36)
MCHC RBC-ENTMCNC: 33.3 GM/DL — SIGNIFICANT CHANGE UP (ref 32–36)
MCV RBC AUTO: 93 FL — SIGNIFICANT CHANGE UP (ref 80–100)
MCV RBC AUTO: 94.4 FL — SIGNIFICANT CHANGE UP (ref 80–100)
MONOCYTES # BLD AUTO: 0.77 K/UL — SIGNIFICANT CHANGE UP (ref 0–0.9)
MONOCYTES NFR BLD AUTO: 6.2 % — SIGNIFICANT CHANGE UP (ref 2–14)
NEUTROPHILS # BLD AUTO: 7.9 K/UL — HIGH (ref 1.8–7.4)
NEUTROPHILS NFR BLD AUTO: 63.5 % — SIGNIFICANT CHANGE UP (ref 43–77)
NRBC # BLD: 0 /100 WBCS — SIGNIFICANT CHANGE UP (ref 0–0)
NRBC # BLD: 0 /100 WBCS — SIGNIFICANT CHANGE UP (ref 0–0)
PLATELET # BLD AUTO: 260 K/UL — SIGNIFICANT CHANGE UP (ref 150–400)
PLATELET # BLD AUTO: 332 K/UL — SIGNIFICANT CHANGE UP (ref 150–400)
POTASSIUM SERPL-MCNC: 3.9 MMOL/L — SIGNIFICANT CHANGE UP (ref 3.5–5.3)
POTASSIUM SERPL-SCNC: 3.9 MMOL/L — SIGNIFICANT CHANGE UP (ref 3.5–5.3)
PROTHROM AB SERPL-ACNC: 10.6 SEC — SIGNIFICANT CHANGE UP (ref 9.5–13)
RBC # BLD: 4.16 M/UL — SIGNIFICANT CHANGE UP (ref 3.8–5.2)
RBC # BLD: 4.48 M/UL — SIGNIFICANT CHANGE UP (ref 3.8–5.2)
RBC # FLD: 14.1 % — SIGNIFICANT CHANGE UP (ref 10.3–14.5)
RBC # FLD: 14.5 % — SIGNIFICANT CHANGE UP (ref 10.3–14.5)
RH IG SCN BLD-IMP: POSITIVE — SIGNIFICANT CHANGE UP
SODIUM SERPL-SCNC: 137 MMOL/L — SIGNIFICANT CHANGE UP (ref 135–145)
WBC # BLD: 10.47 K/UL — SIGNIFICANT CHANGE UP (ref 3.8–10.5)
WBC # BLD: 12.45 K/UL — HIGH (ref 3.8–10.5)
WBC # FLD AUTO: 10.47 K/UL — SIGNIFICANT CHANGE UP (ref 3.8–10.5)
WBC # FLD AUTO: 12.45 K/UL — HIGH (ref 3.8–10.5)

## 2024-06-23 PROCEDURE — S4016P: CUSTOM

## 2024-06-23 PROCEDURE — 71275 CT ANGIOGRAPHY CHEST: CPT | Mod: 26,MC

## 2024-06-23 PROCEDURE — 76948 ECHO GUIDE OVA ASPIRATION: CPT | Mod: NC

## 2024-06-23 PROCEDURE — 93010 ELECTROCARDIOGRAM REPORT: CPT | Mod: NC

## 2024-06-23 PROCEDURE — 76705 ECHO EXAM OF ABDOMEN: CPT | Mod: 26

## 2024-06-23 PROCEDURE — 93308 TTE F-UP OR LMTD: CPT | Mod: 26

## 2024-06-23 PROCEDURE — 74174 CTA ABD&PLVS W/CONTRAST: CPT | Mod: 26,MC

## 2024-06-23 PROCEDURE — 71045 X-RAY EXAM CHEST 1 VIEW: CPT | Mod: 26

## 2024-06-23 PROCEDURE — 58970 RETRIEVAL OF OOCYTE: CPT | Mod: NC

## 2024-06-23 PROCEDURE — 89281P: CUSTOM

## 2024-06-23 PROCEDURE — 99291 CRITICAL CARE FIRST HOUR: CPT

## 2024-06-23 RX ORDER — ACETAMINOPHEN 325 MG
1000 TABLET ORAL EVERY 6 HOURS
Refills: 0 | Status: DISCONTINUED | OUTPATIENT
Start: 2024-06-23 | End: 2024-06-24

## 2024-06-23 RX ORDER — LOSARTAN POTASSIUM 100 MG/1
1 TABLET, FILM COATED ORAL
Refills: 0 | DISCHARGE

## 2024-06-23 RX ORDER — SODIUM CHLORIDE 0.9 % (FLUSH) 0.9 %
1000 SYRINGE (ML) INJECTION ONCE
Refills: 0 | Status: COMPLETED | OUTPATIENT
Start: 2024-06-23 | End: 2024-06-23

## 2024-06-23 RX ORDER — ALBUTEROL 90 UG/1
2 AEROSOL, METERED ORAL
Refills: 0 | DISCHARGE

## 2024-06-23 RX ADMIN — Medication 1000 MILLIGRAM(S): at 14:59

## 2024-06-23 RX ADMIN — Medication 1000 MILLILITER(S): at 13:32

## 2024-06-24 ENCOUNTER — TRANSCRIPTION ENCOUNTER (OUTPATIENT)
Age: 44
End: 2024-06-24

## 2024-06-24 ENCOUNTER — APPOINTMENT (OUTPATIENT)
Dept: HUMAN REPRODUCTION | Facility: CLINIC | Age: 44
End: 2024-06-24
Payer: COMMERCIAL

## 2024-06-24 VITALS
DIASTOLIC BLOOD PRESSURE: 66 MMHG | HEART RATE: 61 BPM | OXYGEN SATURATION: 94 % | SYSTOLIC BLOOD PRESSURE: 102 MMHG | RESPIRATION RATE: 19 BRPM | TEMPERATURE: 99 F

## 2024-06-24 PROBLEM — E66.9 OBESITY, UNSPECIFIED: Chronic | Status: INACTIVE | Noted: 2023-08-22 | Resolved: 2024-06-23

## 2024-06-24 LAB
BASOPHILS # BLD AUTO: 0.03 K/UL — SIGNIFICANT CHANGE UP (ref 0–0.2)
BASOPHILS NFR BLD AUTO: 0.5 % — SIGNIFICANT CHANGE UP (ref 0–2)
EOSINOPHIL # BLD AUTO: 0.2 K/UL — SIGNIFICANT CHANGE UP (ref 0–0.5)
EOSINOPHIL NFR BLD AUTO: 3.1 % — SIGNIFICANT CHANGE UP (ref 0–6)
HCT VFR BLD CALC: 35.6 % — SIGNIFICANT CHANGE UP (ref 34.5–45)
HGB BLD-MCNC: 12 G/DL — SIGNIFICANT CHANGE UP (ref 11.5–15.5)
IMM GRANULOCYTES NFR BLD AUTO: 0.3 % — SIGNIFICANT CHANGE UP (ref 0–0.9)
LYMPHOCYTES # BLD AUTO: 1.67 K/UL — SIGNIFICANT CHANGE UP (ref 1–3.3)
LYMPHOCYTES # BLD AUTO: 25.8 % — SIGNIFICANT CHANGE UP (ref 13–44)
MCHC RBC-ENTMCNC: 31.7 PG — SIGNIFICANT CHANGE UP (ref 27–34)
MCHC RBC-ENTMCNC: 33.7 GM/DL — SIGNIFICANT CHANGE UP (ref 32–36)
MCV RBC AUTO: 93.9 FL — SIGNIFICANT CHANGE UP (ref 80–100)
MONOCYTES # BLD AUTO: 0.49 K/UL — SIGNIFICANT CHANGE UP (ref 0–0.9)
MONOCYTES NFR BLD AUTO: 7.6 % — SIGNIFICANT CHANGE UP (ref 2–14)
NEUTROPHILS # BLD AUTO: 4.07 K/UL — SIGNIFICANT CHANGE UP (ref 1.8–7.4)
NEUTROPHILS NFR BLD AUTO: 62.7 % — SIGNIFICANT CHANGE UP (ref 43–77)
NRBC # BLD: 0 /100 WBCS — SIGNIFICANT CHANGE UP (ref 0–0)
PLATELET # BLD AUTO: 248 K/UL — SIGNIFICANT CHANGE UP (ref 150–400)
RBC # BLD: 3.79 M/UL — LOW (ref 3.8–5.2)
RBC # FLD: 14.6 % — HIGH (ref 10.3–14.5)
WBC # BLD: 6.48 K/UL — SIGNIFICANT CHANGE UP (ref 3.8–10.5)
WBC # FLD AUTO: 6.48 K/UL — SIGNIFICANT CHANGE UP (ref 3.8–10.5)

## 2024-06-24 PROCEDURE — 71275 CT ANGIOGRAPHY CHEST: CPT | Mod: MC

## 2024-06-24 PROCEDURE — 85027 COMPLETE CBC AUTOMATED: CPT

## 2024-06-24 PROCEDURE — 85730 THROMBOPLASTIN TIME PARTIAL: CPT

## 2024-06-24 PROCEDURE — 86901 BLOOD TYPING SEROLOGIC RH(D): CPT

## 2024-06-24 PROCEDURE — 93308 TTE F-UP OR LMTD: CPT

## 2024-06-24 PROCEDURE — 80048 BASIC METABOLIC PNL TOTAL CA: CPT

## 2024-06-24 PROCEDURE — 86900 BLOOD TYPING SEROLOGIC ABO: CPT

## 2024-06-24 PROCEDURE — 71045 X-RAY EXAM CHEST 1 VIEW: CPT

## 2024-06-24 PROCEDURE — 36415 COLL VENOUS BLD VENIPUNCTURE: CPT

## 2024-06-24 PROCEDURE — 86850 RBC ANTIBODY SCREEN: CPT

## 2024-06-24 PROCEDURE — 93005 ELECTROCARDIOGRAM TRACING: CPT

## 2024-06-24 PROCEDURE — 74174 CTA ABD&PLVS W/CONTRAST: CPT | Mod: MC

## 2024-06-24 PROCEDURE — 76705 ECHO EXAM OF ABDOMEN: CPT

## 2024-06-24 PROCEDURE — 99291 CRITICAL CARE FIRST HOUR: CPT

## 2024-06-24 PROCEDURE — 85025 COMPLETE CBC W/AUTO DIFF WBC: CPT

## 2024-06-24 PROCEDURE — 82962 GLUCOSE BLOOD TEST: CPT

## 2024-06-24 PROCEDURE — 85610 PROTHROMBIN TIME: CPT

## 2024-06-27 PROBLEM — E11.9 TYPE 2 DIABETES MELLITUS WITHOUT COMPLICATIONS: Chronic | Status: ACTIVE | Noted: 2020-07-20

## 2024-06-28 ENCOUNTER — APPOINTMENT (OUTPATIENT)
Dept: HUMAN REPRODUCTION | Facility: CLINIC | Age: 44
End: 2024-06-28
Payer: COMMERCIAL

## 2024-06-28 ENCOUNTER — TRANSCRIPTION ENCOUNTER (OUTPATIENT)
Age: 44
End: 2024-06-28

## 2024-06-28 DIAGNOSIS — N98.1 HYPERSTIMULATION OF OVARIES: ICD-10-CM

## 2024-06-28 DIAGNOSIS — H52.203 UNSPECIFIED ASTIGMATISM, BILATERAL: ICD-10-CM

## 2024-06-28 DIAGNOSIS — M19.90 UNSPECIFIED OSTEOARTHRITIS, UNSPECIFIED SITE: ICD-10-CM

## 2024-06-28 DIAGNOSIS — I95.81 POSTPROCEDURAL HYPOTENSION: ICD-10-CM

## 2024-06-28 DIAGNOSIS — J45.909 UNSPECIFIED ASTHMA, UNCOMPLICATED: ICD-10-CM

## 2024-06-28 DIAGNOSIS — F32.A DEPRESSION, UNSPECIFIED: ICD-10-CM

## 2024-06-28 DIAGNOSIS — Z97.5 PRESENCE OF (INTRAUTERINE) CONTRACEPTIVE DEVICE: ICD-10-CM

## 2024-06-28 DIAGNOSIS — N85.00 ENDOMETRIAL HYPERPLASIA, UNSPECIFIED: ICD-10-CM

## 2024-06-28 DIAGNOSIS — F41.9 ANXIETY DISORDER, UNSPECIFIED: ICD-10-CM

## 2024-06-28 DIAGNOSIS — Z98.84 BARIATRIC SURGERY STATUS: ICD-10-CM

## 2024-06-28 DIAGNOSIS — R09.02 HYPOXEMIA: ICD-10-CM
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
## 2024-06-28 DIAGNOSIS — E28.2 POLYCYSTIC OVARIAN SYNDROME: ICD-10-CM

## 2024-06-28 DIAGNOSIS — Z12.39 ENCOUNTER FOR OTHER SCREENING FOR MALIGNANT NEOPLASM OF BREAST: ICD-10-CM
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
## 2024-06-28 PROCEDURE — 99213 OFFICE O/P EST LOW 20 MIN: CPT | Mod: NC

## 2024-06-28 PROCEDURE — 76830 TRANSVAGINAL US NON-OB: CPT | Mod: NC

## 2024-06-29 PROCEDURE — 89290P: CUSTOM

## 2024-07-02 ENCOUNTER — TRANSCRIPTION ENCOUNTER (OUTPATIENT)
Age: 44
End: 2024-07-02

## 2024-07-02 PROBLEM — I10 ESSENTIAL (PRIMARY) HYPERTENSION: Chronic | Status: ACTIVE | Noted: 2020-07-20

## 2024-07-08 ENCOUNTER — APPOINTMENT (OUTPATIENT)
Dept: ULTRASOUND IMAGING | Facility: HOSPITAL | Age: 44
End: 2024-07-08
Payer: COMMERCIAL

## 2024-07-08 ENCOUNTER — RESULT REVIEW (OUTPATIENT)
Age: 44
End: 2024-07-08

## 2024-07-08 ENCOUNTER — OUTPATIENT (OUTPATIENT)
Dept: OUTPATIENT SERVICES | Facility: HOSPITAL | Age: 44
LOS: 1 days | End: 2024-07-08

## 2024-07-08 ENCOUNTER — APPOINTMENT (OUTPATIENT)
Dept: MAMMOGRAPHY | Facility: HOSPITAL | Age: 44
End: 2024-07-08
Payer: COMMERCIAL

## 2024-07-08 ENCOUNTER — APPOINTMENT (OUTPATIENT)
Dept: HEART AND VASCULAR | Facility: CLINIC | Age: 44
End: 2024-07-08
Payer: COMMERCIAL

## 2024-07-08 VITALS
WEIGHT: 183 LBS | SYSTOLIC BLOOD PRESSURE: 124 MMHG | OXYGEN SATURATION: 98 % | TEMPERATURE: 97.6 F | HEART RATE: 62 BPM | HEIGHT: 62 IN | BODY MASS INDEX: 33.68 KG/M2 | DIASTOLIC BLOOD PRESSURE: 83 MMHG

## 2024-07-08 DIAGNOSIS — Z98.890 OTHER SPECIFIED POSTPROCEDURAL STATES: Chronic | ICD-10-CM

## 2024-07-08 DIAGNOSIS — R55 DIZZINESS AND GIDDINESS: ICD-10-CM

## 2024-07-08 DIAGNOSIS — G47.30 SLEEP APNEA, UNSPECIFIED: ICD-10-CM

## 2024-07-08 DIAGNOSIS — K08.409 PARTIAL LOSS OF TEETH, UNSPECIFIED CAUSE, UNSPECIFIED CLASS: Chronic | ICD-10-CM

## 2024-07-08 DIAGNOSIS — E28.2 POLYCYSTIC OVARIAN SYNDROME: ICD-10-CM

## 2024-07-08 DIAGNOSIS — K76.0 FATTY (CHANGE OF) LIVER, NOT ELSEWHERE CLASSIFIED: ICD-10-CM

## 2024-07-08 DIAGNOSIS — Z41.9 ENCOUNTER FOR PROCEDURE FOR PURPOSES OTHER THAN REMEDYING HEALTH STATE, UNSPECIFIED: Chronic | ICD-10-CM

## 2024-07-08 DIAGNOSIS — R42 DIZZINESS AND GIDDINESS: ICD-10-CM

## 2024-07-08 PROCEDURE — 36415 COLL VENOUS BLD VENIPUNCTURE: CPT

## 2024-07-08 PROCEDURE — 99205 OFFICE O/P NEW HI 60 MIN: CPT

## 2024-07-08 PROCEDURE — 76641 ULTRASOUND BREAST COMPLETE: CPT

## 2024-07-08 PROCEDURE — 77063 BREAST TOMOSYNTHESIS BI: CPT | Mod: 26

## 2024-07-08 PROCEDURE — 77063 BREAST TOMOSYNTHESIS BI: CPT

## 2024-07-08 PROCEDURE — 77067 SCR MAMMO BI INCL CAD: CPT | Mod: 26

## 2024-07-08 PROCEDURE — 76641 ULTRASOUND BREAST COMPLETE: CPT | Mod: 26,50

## 2024-07-08 PROCEDURE — 93000 ELECTROCARDIOGRAM COMPLETE: CPT

## 2024-07-08 PROCEDURE — G2211 COMPLEX E/M VISIT ADD ON: CPT | Mod: NC,1L

## 2024-07-08 PROCEDURE — 77067 SCR MAMMO BI INCL CAD: CPT

## 2024-07-10 ENCOUNTER — TRANSCRIPTION ENCOUNTER (OUTPATIENT)
Age: 44
End: 2024-07-10

## 2024-07-10 DIAGNOSIS — N63.0 UNSPECIFIED LUMP IN UNSPECIFIED BREAST: ICD-10-CM

## 2024-07-11 ENCOUNTER — APPOINTMENT (OUTPATIENT)
Dept: ENDOCRINOLOGY | Facility: CLINIC | Age: 44
End: 2024-07-11
Payer: COMMERCIAL

## 2024-07-11 VITALS
SYSTOLIC BLOOD PRESSURE: 117 MMHG | DIASTOLIC BLOOD PRESSURE: 76 MMHG | WEIGHT: 182 LBS | BODY MASS INDEX: 33.29 KG/M2 | HEART RATE: 73 BPM

## 2024-07-11 DIAGNOSIS — E04.2 NONTOXIC MULTINODULAR GOITER: ICD-10-CM

## 2024-07-11 DIAGNOSIS — E55.9 VITAMIN D DEFICIENCY, UNSPECIFIED: ICD-10-CM

## 2024-07-11 DIAGNOSIS — E11.9 TYPE 2 DIABETES MELLITUS W/OUT COMPLICATIONS: ICD-10-CM

## 2024-07-11 DIAGNOSIS — E66.9 OBESITY, UNSPECIFIED: ICD-10-CM

## 2024-07-11 LAB — GLUCOSE BLDC GLUCOMTR-MCNC: 194

## 2024-07-11 PROCEDURE — 83036 HEMOGLOBIN GLYCOSYLATED A1C: CPT | Mod: QW

## 2024-07-11 PROCEDURE — 99214 OFFICE O/P EST MOD 30 MIN: CPT

## 2024-07-11 PROCEDURE — 82962 GLUCOSE BLOOD TEST: CPT

## 2024-07-11 PROCEDURE — G2211 COMPLEX E/M VISIT ADD ON: CPT | Mod: NC,1L

## 2024-07-12 LAB
CREAT SPEC-SCNC: 83 MG/DL
MICROALBUMIN 24H UR DL<=1MG/L-MCNC: <1.2 MG/DL
MICROALBUMIN/CREAT 24H UR-RTO: NORMAL MG/G

## 2024-07-15 ENCOUNTER — APPOINTMENT (OUTPATIENT)
Dept: PULMONOLOGY | Facility: CLINIC | Age: 44
End: 2024-07-15
Payer: COMMERCIAL

## 2024-07-15 VITALS
BODY MASS INDEX: 32.76 KG/M2 | HEIGHT: 62 IN | HEART RATE: 63 BPM | RESPIRATION RATE: 12 BRPM | DIASTOLIC BLOOD PRESSURE: 74 MMHG | TEMPERATURE: 97.5 F | OXYGEN SATURATION: 98 % | SYSTOLIC BLOOD PRESSURE: 107 MMHG | WEIGHT: 178 LBS

## 2024-07-15 DIAGNOSIS — J81.0 ACUTE PULMONARY EDEMA: ICD-10-CM

## 2024-07-15 DIAGNOSIS — J96.01 ACUTE RESPIRATORY FAILURE WITH HYPOXIA: ICD-10-CM

## 2024-07-15 DIAGNOSIS — G47.33 OBSTRUCTIVE SLEEP APNEA (ADULT) (PEDIATRIC): ICD-10-CM

## 2024-07-15 DIAGNOSIS — J45.909 UNSPECIFIED ASTHMA, UNCOMPLICATED: ICD-10-CM

## 2024-07-15 PROCEDURE — 99214 OFFICE O/P EST MOD 30 MIN: CPT

## 2024-07-31 ENCOUNTER — APPOINTMENT (OUTPATIENT)
Dept: HEART AND VASCULAR | Facility: CLINIC | Age: 44
End: 2024-07-31
Payer: COMMERCIAL

## 2024-07-31 VITALS — HEART RATE: 64 BPM | DIASTOLIC BLOOD PRESSURE: 76 MMHG | OXYGEN SATURATION: 98 % | SYSTOLIC BLOOD PRESSURE: 107 MMHG

## 2024-07-31 PROCEDURE — 93306 TTE W/DOPPLER COMPLETE: CPT

## 2024-08-08 ENCOUNTER — NON-APPOINTMENT (OUTPATIENT)
Age: 44
End: 2024-08-08

## 2024-08-09 ENCOUNTER — OUTPATIENT (OUTPATIENT)
Dept: OUTPATIENT SERVICES | Facility: HOSPITAL | Age: 44
LOS: 1 days | End: 2024-08-09

## 2024-08-09 ENCOUNTER — APPOINTMENT (OUTPATIENT)
Dept: ULTRASOUND IMAGING | Facility: CLINIC | Age: 44
End: 2024-08-09

## 2024-08-09 ENCOUNTER — APPOINTMENT (OUTPATIENT)
Dept: RADIOLOGY | Facility: CLINIC | Age: 44
End: 2024-08-09

## 2024-08-09 DIAGNOSIS — K08.409 PARTIAL LOSS OF TEETH, UNSPECIFIED CAUSE, UNSPECIFIED CLASS: Chronic | ICD-10-CM

## 2024-08-09 DIAGNOSIS — Z98.84 BARIATRIC SURGERY STATUS: Chronic | ICD-10-CM

## 2024-08-09 DIAGNOSIS — Z98.890 OTHER SPECIFIED POSTPROCEDURAL STATES: Chronic | ICD-10-CM

## 2024-08-09 DIAGNOSIS — Z41.9 ENCOUNTER FOR PROCEDURE FOR PURPOSES OTHER THAN REMEDYING HEALTH STATE, UNSPECIFIED: Chronic | ICD-10-CM

## 2024-08-09 PROCEDURE — 71046 X-RAY EXAM CHEST 2 VIEWS: CPT | Mod: 26

## 2024-08-09 PROCEDURE — 76536 US EXAM OF HEAD AND NECK: CPT | Mod: 26

## 2024-08-12 ENCOUNTER — TRANSCRIPTION ENCOUNTER (OUTPATIENT)
Age: 44
End: 2024-08-12

## 2024-08-13 NOTE — BRIEF OPERATIVE NOTE - NSICDXBRIEFPROCEDURE_GEN_ALL_CORE_FT
PROCEDURES:  Laparoscopic gastric bypass 23-Aug-2023 19:10:51  Solomon Man  Repair, hernia, hiatal, robot-assisted 23-Aug-2023 19:11:03  Solomon Man  
no

## 2024-09-12 ENCOUNTER — APPOINTMENT (OUTPATIENT)
Dept: BARIATRICS | Facility: CLINIC | Age: 44
End: 2024-09-12
Payer: COMMERCIAL

## 2024-09-12 ENCOUNTER — APPOINTMENT (OUTPATIENT)
Dept: ULTRASOUND IMAGING | Facility: HOSPITAL | Age: 44
End: 2024-09-12

## 2024-09-12 VITALS
HEIGHT: 62 IN | TEMPERATURE: 97.3 F | HEART RATE: 60 BPM | DIASTOLIC BLOOD PRESSURE: 79 MMHG | WEIGHT: 185 LBS | SYSTOLIC BLOOD PRESSURE: 119 MMHG | BODY MASS INDEX: 34.04 KG/M2 | OXYGEN SATURATION: 98 %

## 2024-09-12 DIAGNOSIS — Z98.84 BARIATRIC SURGERY STATUS: ICD-10-CM

## 2024-09-12 PROCEDURE — 99214 OFFICE O/P EST MOD 30 MIN: CPT

## 2024-09-12 NOTE — END OF VISIT
[Time Spent: ___ minutes] : I have spent [unfilled] minutes of time on the encounter which excludes teaching and separately reported services. [FreeTextEntry3] :  All medical record entries made by the Scribe were at my, JC Harrington , direction and personally dictated by me on 09/12/2024 . I have reviewed the chart and agree that the record accurately reflects my personal performance of the history, physical exam, assessment and plan. I have also personally directed, reviewed, and agreed with the chart.

## 2024-09-12 NOTE — HISTORY OF PRESENT ILLNESS
[de-identified] : Patient is a 45 y/o F 13 months s/p RYGB who presents today for a follow up. Patient has gained 7 lbs since her last visit and attributes it to the lack of exercise as advised by her OB/GYN during IVF egg retrieval stage. Patient further mentions that she had a hypotensive and bradycardia episode in June most probably as a side effect from IVF treatment and followed up with a cardiologist who did not find any significant findings in the workup. Patient also mentions that she has a follow up appointment with endocrinologist to discuss possible option of trulicity for diabetes and further weight loss as well thyroid biopsy for surveillance of thyroid nodules. From a bariatric standpoint, patient is doing really well and has lost almost 100 lbs since surgery. Patient has began exercising again and we reviewed the importance of adhering to lifestyle modifications to optimize and maintain weight loss. Compliant with vitamins. Had recent blood work done with endocrinology as well. No other concerns at this time. Will follow up in 3 months.

## 2024-09-12 NOTE — ASSESSMENT
[FreeTextEntry1] : Patient is a 45 y/o F 13 months s/p RYGB who presents today for a follow up. Patient has gained 7 lbs since her last visit and attributes it to the lack of exercise as advised by her OB/GYN during IVF egg retrieval stage. Patient further mentions that she had a hypotensive and bradycardia episode in June most probably as a side effect from IVF treatment and followed up with a cardiologist who did not find any significant findings in the workup. Patient also mentions that she has a follow up appointment with endocrinologist to discuss possible option of trulicity for diabetes and further weight loss as well thyroid biopsy for surveillance of thyroid nodules. From a bariatric standpoint, patient is doing really well and has lost almost 100 lbs since surgery. Patient has began exercising again and we reviewed the importance of adhering to lifestyle modifications to optimize and maintain weight loss. Compliant with vitamins. Had recent blood work done with endocrinology as well. No other concerns at this time. Will follow up in 3 months.

## 2024-09-23 ENCOUNTER — APPOINTMENT (OUTPATIENT)
Dept: PULMONOLOGY | Facility: CLINIC | Age: 44
End: 2024-09-23
Payer: COMMERCIAL

## 2024-09-23 ENCOUNTER — APPOINTMENT (OUTPATIENT)
Dept: INTERNAL MEDICINE | Facility: CLINIC | Age: 44
End: 2024-09-23
Payer: COMMERCIAL

## 2024-09-23 ENCOUNTER — APPOINTMENT (OUTPATIENT)
Dept: ENDOCRINOLOGY | Facility: CLINIC | Age: 44
End: 2024-09-23
Payer: COMMERCIAL

## 2024-09-23 VITALS
DIASTOLIC BLOOD PRESSURE: 71 MMHG | WEIGHT: 185 LBS | OXYGEN SATURATION: 99 % | HEIGHT: 62 IN | TEMPERATURE: 97.7 F | HEART RATE: 61 BPM | SYSTOLIC BLOOD PRESSURE: 107 MMHG | BODY MASS INDEX: 34.04 KG/M2

## 2024-09-23 VITALS
HEART RATE: 67 BPM | HEIGHT: 62 IN | SYSTOLIC BLOOD PRESSURE: 120 MMHG | TEMPERATURE: 97.6 F | BODY MASS INDEX: 34.04 KG/M2 | OXYGEN SATURATION: 98 % | DIASTOLIC BLOOD PRESSURE: 76 MMHG | WEIGHT: 185 LBS

## 2024-09-23 VITALS
HEART RATE: 56 BPM | WEIGHT: 185 LBS | BODY MASS INDEX: 33.84 KG/M2 | DIASTOLIC BLOOD PRESSURE: 72 MMHG | SYSTOLIC BLOOD PRESSURE: 112 MMHG

## 2024-09-23 DIAGNOSIS — Z87.898 PERSONAL HISTORY OF OTHER SPECIFIED CONDITIONS: ICD-10-CM

## 2024-09-23 DIAGNOSIS — Z86.79 PERSONAL HISTORY OF OTHER DISEASES OF THE CIRCULATORY SYSTEM: ICD-10-CM

## 2024-09-23 DIAGNOSIS — R42 DIZZINESS AND GIDDINESS: ICD-10-CM

## 2024-09-23 DIAGNOSIS — J81.0 ACUTE PULMONARY EDEMA: ICD-10-CM

## 2024-09-23 DIAGNOSIS — Z23 ENCOUNTER FOR IMMUNIZATION: ICD-10-CM

## 2024-09-23 DIAGNOSIS — R79.89 OTHER SPECIFIED ABNORMAL FINDINGS OF BLOOD CHEMISTRY: ICD-10-CM

## 2024-09-23 DIAGNOSIS — G47.33 OBSTRUCTIVE SLEEP APNEA (ADULT) (PEDIATRIC): ICD-10-CM

## 2024-09-23 DIAGNOSIS — Z78.9 OTHER SPECIFIED HEALTH STATUS: ICD-10-CM

## 2024-09-23 DIAGNOSIS — E04.2 NONTOXIC MULTINODULAR GOITER: ICD-10-CM

## 2024-09-23 DIAGNOSIS — M26.629 ARTHRALGIA OF TEMPOROMANDIBULAR JOINT,: ICD-10-CM

## 2024-09-23 DIAGNOSIS — E11.9 TYPE 2 DIABETES MELLITUS W/OUT COMPLICATIONS: ICD-10-CM

## 2024-09-23 DIAGNOSIS — Z00.00 ENCOUNTER FOR GENERAL ADULT MEDICAL EXAMINATION W/OUT ABNORMAL FINDINGS: ICD-10-CM

## 2024-09-23 DIAGNOSIS — J96.01 ACUTE RESPIRATORY FAILURE WITH HYPOXIA: ICD-10-CM

## 2024-09-23 LAB — GLUCOSE BLDC GLUCOMTR-MCNC: 107

## 2024-09-23 PROCEDURE — 99213 OFFICE O/P EST LOW 20 MIN: CPT

## 2024-09-23 PROCEDURE — 90656 IIV3 VACC NO PRSV 0.5 ML IM: CPT

## 2024-09-23 PROCEDURE — 99214 OFFICE O/P EST MOD 30 MIN: CPT | Mod: 25

## 2024-09-23 PROCEDURE — 99396 PREV VISIT EST AGE 40-64: CPT | Mod: 25

## 2024-09-23 PROCEDURE — 82962 GLUCOSE BLOOD TEST: CPT

## 2024-09-23 PROCEDURE — G0008: CPT

## 2024-09-23 PROCEDURE — 36415 COLL VENOUS BLD VENIPUNCTURE: CPT

## 2024-09-23 RX ORDER — GLUC/MSM/COLGN2/HYAL/ANTIARTH3 375-375-20
TABLET ORAL
Refills: 0 | Status: ACTIVE | COMMUNITY

## 2024-09-23 RX ORDER — PNV NO.95/FERROUS FUM/FOLIC AC 28MG-0.8MG
TABLET ORAL
Refills: 0 | Status: ACTIVE | COMMUNITY

## 2024-09-23 NOTE — PHYSICAL EXAM
[No Acute Distress] : no acute distress [Well Nourished] : well nourished [Well Developed] : well developed [Well-Appearing] : well-appearing [Normal Sclera/Conjunctiva] : normal sclera/conjunctiva [PERRL] : pupils equal round and reactive to light [EOMI] : extraocular movements intact [Normal Outer Ear/Nose] : the outer ears and nose were normal in appearance [Normal Oropharynx] : the oropharynx was normal [No JVD] : no jugular venous distention [No Lymphadenopathy] : no lymphadenopathy [Supple] : supple [Thyroid Normal, No Nodules] : the thyroid was normal and there were no nodules present [No Respiratory Distress] : no respiratory distress  [No Accessory Muscle Use] : no accessory muscle use [Clear to Auscultation] : lungs were clear to auscultation bilaterally [Normal Rate] : normal rate  [Regular Rhythm] : with a regular rhythm [Normal S1, S2] : normal S1 and S2 [No Murmur] : no murmur heard [No Carotid Bruits] : no carotid bruits [No Abdominal Bruit] : a ~M bruit was not heard ~T in the abdomen [No Varicosities] : no varicosities [Pedal Pulses Present] : the pedal pulses are present [No Edema] : there was no peripheral edema [No Palpable Aorta] : no palpable aorta [No Extremity Clubbing/Cyanosis] : no extremity clubbing/cyanosis [Normal Appearance] : normal in appearance [No Nipple Discharge] : no nipple discharge [No Axillary Lymphadenopathy] : no axillary lymphadenopathy [Soft] : abdomen soft [Non Tender] : non-tender [Non-distended] : non-distended [No Masses] : no abdominal mass palpated [No HSM] : no HSM [Normal Bowel Sounds] : normal bowel sounds [Normal Posterior Cervical Nodes] : no posterior cervical lymphadenopathy [Normal Anterior Cervical Nodes] : no anterior cervical lymphadenopathy [No CVA Tenderness] : no CVA  tenderness [No Spinal Tenderness] : no spinal tenderness [No Joint Swelling] : no joint swelling [Grossly Normal Strength/Tone] : grossly normal strength/tone [No Rash] : no rash [Coordination Grossly Intact] : coordination grossly intact [No Focal Deficits] : no focal deficits [Normal Gait] : normal gait [Deep Tendon Reflexes (DTR)] : deep tendon reflexes were 2+ and symmetric [Normal Affect] : the affect was normal [Alert and Oriented x3] : oriented to person, place, and time [Normal Insight/Judgement] : insight and judgment were intact

## 2024-09-23 NOTE — ASSESSMENT
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 This is fine. She will call when she is ready and I can order a repeat HST for her. Likely end of year.

## 2024-09-23 NOTE — PHYSICAL EXAM
[Alert] : alert [No Acute Distress] : no acute distress [EOMI] : extra ocular movement intact [Normal Hearing] : hearing was normal [Thyroid Not Enlarged] : the thyroid was not enlarged [No Respiratory Distress] : no respiratory distress [No Accessory Muscle Use] : no accessory muscle use [Normal Rate and Effort] : normal respiratory rate and effort [Clear to Auscultation] : lungs were clear to auscultation bilaterally [Normal S1, S2] : normal S1 and S2 [Normal Rate] : heart rate was normal [No Edema] : no peripheral edema [Pedal Pulses Normal] : the pedal pulses are present [Not Distended] : not distended [Spine Straight] : spine straight [No Stigmata of Cushings Syndrome] : no stigmata of Cushings Syndrome [Normal Gait] : normal gait [No Joint Swelling] : no joint swelling seen [Oriented x3] : oriented to person, place, and time [Normal Insight/Judgement] : insight and judgment were intact [de-identified] : Left lower thyroid nodule palpable

## 2024-09-23 NOTE — HEALTH RISK ASSESSMENT
[Patient reported mammogram was abnormal] : Patient reported mammogram was abnormal [Alone] : lives alone [Good] : ~his/her~  mood as  good [No falls in past year] : Patient reported no falls in the past year [0] : 2) Feeling down, depressed, or hopeless: Not at all (0) [PHQ-2 Negative - No further assessment needed] : PHQ-2 Negative - No further assessment needed [[id number]] : 0 [Never] : Never [HIV test declined] : HIV test declined [Hepatitis C test declined] : Hepatitis C test declined [Change in mental status noted] : No change in mental status noted [Language] : denies difficulty with language [Behavior] : denies difficulty with behavior [Learning/Retaining New Information] : denies difficulty learning/retaining new information [Handling Complex Tasks] : denies difficulty handling complex tasks [Reasoning] : denies difficulty with reasoning [Spatial Ability and Orientation] : denies difficulty with spatial ability and orientation [None] : None [Employed] : employed [] :  [Fully functional (bathing, dressing, toileting, transferring, walking, feeding)] : Fully functional (bathing, dressing, toileting, transferring, walking, feeding) [Fully functional (using the telephone, shopping, preparing meals, housekeeping, doing laundry, using] : Fully functional and needs no help or supervision to perform IADLs (using the telephone, shopping, preparing meals, housekeeping, doing laundry, using transportation, managing medications and managing finances) [Reports changes in hearing] : Reports no changes in hearing [Reports changes in vision] : Reports no changes in vision [Reports changes in dental health] : Reports no changes in dental health [Smoke Detector] : smoke detector [Safety elements used in home] : safety elements used in home [Seat Belt] :  uses seat belt [MammogramDate] : 08/24 [MammogramComments] : BIRADS 3- f/u sonogram 1/25

## 2024-09-23 NOTE — HISTORY OF PRESENT ILLNESS
[FreeTextEntry1] : Carlin Wolf is a 44 year old female who presents for follow up on Type 2 DM, Obesity, PCOS, thyroid nodules.   Patient of Dr. Varela, last seen June 2024   PMHx: Type 2 DM, PCOS, Obesity, thyroid nodules, asthma, seasonal allergies, complex atypical endometrial hyperplasia PSHx: Annamarie en Y gastric bypass (August 2023) FMHx: goiter (mother)  We discussed recent events: after last egg retrieval she had hypotension/hypoxia post-op and was taken to ER where she had a blood transfusion and was admitted O/N.  Was recommended to see pulmonology, cardiology which she did (notes reviewed).  She has one more IVF session planned w/Dr. Pride and wanted to follow up with specialists prior to proceeding. She has been checking BP at home, and says it is now normal to borderline high (SBP~ 117 usually). Was taking Losartan for HTN until January 2024 when BP improved (likely r/t weight loss)  Has gained a little weight recently (~5lbs) She endorses occasional palpitations, heat intolerance, and salt cravings.  Has issues falling/staying asleep but denies significant fatigue and syncope. No nausea, vomiting, abdominal pain Still having hair loss.    DM diagnosed: October 2019 No known complications Current medication regimen: stopped Mounjaro 5mg weekly in January d/t recent egg retrieval Does not check BG at home  FS in office: 107, fasting -- at goal.  Last A1c 5.3% in July 2024 Has gained ~5lbs since most recent egg retrieval.  Last ophthalmology visit: July 2024   Thyroid nodules: Diagnosed in 2017  First thyroid US in June 2018: 2.0 x 1.3 x 1.5 cm left inferior pole nodule and a right 1.0 x 0.5 x 1.0 cm right inferior nodule without suspicious features. FNA biopsy of right inferior nodule -- August 2022 ([place] II - benign), left inferior nodule June 2018 - benign.  Last thyroid US -- August 2024: Location: Lower pole solid, hypoechoic nodule w/extrathyroidal extension shas increased in size, 2.2 x 1.2 x 1.7 cm, previously 1.5 x 1.2 x 1.7. Has plan for FNA biopsy of left lower pole nodule on Wednesday at [facility]. Last TSH 1.52 (7/11/24) Mother has history of goiter  PCOS:  DIagnosed w/symptoms of oligomenorrhea, polycystic ovaries on US, and hyperandrogenism features clinically and in labs. Hirsutism -- hair growth above lip/chin (plucks). Describes hair loss although she notes she has had increased stress w/IVF lately and forgets to take iron supplement most days. Currently using Mirena IUD for contraception Planning for third egg retrieval in November or so with embryo transfer in 2025.   Hyperparathyroidism  Initially noted in March 2023, presumably in setting of low calcium intake (secondary hyperparathyroidism).  Had improved in January 2024, but in July PTH 67 w/calcium 9.3. Current dietary calcium: Has yogurt daily, cheese once in a while. Calcium supplements: Calcium 667/1000 mg daily - 4 Citracal (takes 4 tablets daily - combination pill)  Vitamin D supplement:  Ergocalciferol 50,000 intl units every two weeks.  Current Medications: 4 citracal tablets daily, multivitamin BID, ergocalciferol, iron tablets (Forgets most days), vitamin B12 once/week, biotin supplements (once/day [facility] biotin gummies -- has 5000 IU daily)

## 2024-09-23 NOTE — REVIEW OF SYSTEMS
[As Noted in HPI] : as noted in HPI [Hair Loss] : hair loss [Heat Intolerance] : heat intolerance [Negative] : Psychiatric [Fatigue] : no fatigue [Decreased Appetite] : appetite not decreased [Recent Weight Gain (___ Lbs)] : no recent weight gain [Ulcer] : no ulcer [FreeTextEntry5] : Palpitations intermittently  [de-identified] : Intermittently

## 2024-09-23 NOTE — REVIEW OF SYSTEMS
[As Noted in HPI] : as noted in HPI [Hair Loss] : hair loss [Heat Intolerance] : heat intolerance [Negative] : Psychiatric [Fatigue] : no fatigue [Decreased Appetite] : appetite not decreased [Recent Weight Gain (___ Lbs)] : no recent weight gain [Ulcer] : no ulcer [FreeTextEntry5] : Palpitations intermittently  [de-identified] : Intermittently

## 2024-09-23 NOTE — HISTORY OF PRESENT ILLNESS
[de-identified] : Patient is a 45 y/o F 13 months s/p RYGB who presents today for a follow up. Patient has gained 7 lbs since her last visit and attributes it to the lack of exercise as advised by her OB/GYN during IVF egg retrieval stage. Patient further mentions that she had a hypotensive and bradycardia episode in June most probably as a side effect from IVF treatment and followed up with a cardiologist who did not find any significant findings in the workup. Patient also mentions that she has a follow up appointment with endocrinologist to discuss possible option of trulicity for diabetes and further weight loss as well thyroid biopsy for surveillance of thyroid nodules. From a bariatric standpoint, patient is doing really well and has lost almost 100 lbs since surgery. Patient has began exercising again and we reviewed the importance of adhering to lifestyle modifications to optimize and maintain weight loss. Compliant with vitamins. Had recent blood work done with endocrinology as well. No other concerns at this time. Will follow up in 3 months.

## 2024-09-23 NOTE — ASSESSMENT
[FreeTextEntry1] : 1. Type 2 DM, well controlled, no known complications History of Annamarie en Y bypass in August 2023, A1c since then have been <5.5%. Not currently taking any medications or checking BG at home.  FS in office was 107; fasting (at goal) Was taking and tolerating Mounjaro 5mg weekly, but medication held since she has been in IVF process (hoping to resume Mounjaro in November after last IVF cycle).   Plan -- Labs today, will call w/results -- Continue to hold Mounjaro, can resume in November. Discussed she will have to stop Mounjaro during pregnancy and reviewed importance of good glucose control in pregnancy (she will let us know when planning for transfer).  -- Reviewed and encouraged lifestyle modifications for good DM care including aiming for 150 min moderate exercise weekly and ADA healthy eating tips. -- Follow up with Dr. aVrela as planned in January 2025  2. Thyroid nodules Diagnosed in 2017 FNA biopsy of left inferior nodule -- June 2018 ([place] II - benign), right inferior nodule -- August 2022 ([place] II - benign)  Last thyroid US --  August 2024: Location: left Lower pole solid, hypoechoic nodule w/extrathyroidal extension shas increased in size, 2.2 x 1.2 x 1.7 cm, previously 1.5 x 1.2 x 1.7. Planning for repeat FNA biopsy of left lower nodule on Wednesday 9/25 at [facility]. Endorsing occasional palpitations, heat intolerance, overnight awakenings although appears clinically euthyroid.  Last TSH: 1.52 (July 2024)  Plan -- Recheck TSH today given symptoms and plan for FNA biopsy on Wednesday. Will call w/results -- Biopsy Wednesday 9/25 at [facility] -- Continue w/yearly ultrasounds. -- Follow up with Dr. Varela as planned in January 2025  3. Hyperparathyroidism Initially noted in March 2023, likely secondary as was in setting of low calcium intake and without hypercalcemia. PTH 67 w/calcium 9.3 in July 2024 Current supplementation regimen: Citracal 4 tablets daily + Vitamin D 50,000 IU supplement every 2 weeks. Dietary calcium intake ~1 serving/day.  Plan -- Recheck PTH, will call w/results -- Continue calcium and vitamin D supplements -- Follow up with Dr. Varela as planned in January 2025  4. Episode of hypotension s/p second IVF egg retrieval  Pt describes experience and wanted to check in with specialists (also saw cardiology, pulmonology) prior to proceeding with next retrieval.  Tolerated first egg retrieval well.  Unlikely to have endocrine cause of hypotension as although pt admits to occasional salt cravings, she denies fatigue, abdominal pain, N/V, weight loss, syncope, baseline hypotension. Will check AM cortisol and TSH though to confirm.  Plan -- Check TSH, random AM cortisol - will call w/results. -- Follow up with Dr. Varela as planned in January 2025

## 2024-09-23 NOTE — ASSESSMENT
[FreeTextEntry1] : 1. Type 2 DM, well controlled, no known complications History of Annamarie en Y bypass in August 2023, A1c since then have been <5.5%. Not currently taking any medications or checking BG at home.  FS in office was 107; fasting (at goal) Was taking and tolerating Mounjaro 5mg weekly, but medication held since she has been in IVF process (hoping to resume Mounjaro in November after last IVF cycle).   Plan -- Labs today, will call w/results -- Continue to hold Mounjaro, can resume in November. Discussed she will have to stop Mounjaro during pregnancy and reviewed importance of good glucose control in pregnancy (she will let us know when planning for transfer).  -- Reviewed and encouraged lifestyle modifications for good DM care including aiming for 150 min moderate exercise weekly and ADA healthy eating tips. -- Follow up with Dr. Varela as planned in January 2025  2. Thyroid nodules Diagnosed in 2017 FNA biopsy of left inferior nodule -- June 2018 ([place] II - benign), right inferior nodule -- August 2022 ([place] II - benign)  Last thyroid US --  August 2024: Location: left Lower pole solid, hypoechoic nodule w/extrathyroidal extension shas increased in size, 2.2 x 1.2 x 1.7 cm, previously 1.5 x 1.2 x 1.7. Planning for repeat FNA biopsy of left lower nodule on Wednesday 9/25 at [facility]. Endorsing occasional palpitations, heat intolerance, overnight awakenings although appears clinically euthyroid.  Last TSH: 1.52 (July 2024)  Plan -- Recheck TSH today given symptoms and plan for FNA biopsy on Wednesday. Will call w/results -- Biopsy Wednesday 9/25 at [facility] -- Continue w/yearly ultrasounds. -- Follow up with Dr. Varela as planned in January 2025  3. Hyperparathyroidism Initially noted in March 2023, likely secondary as was in setting of low calcium intake and without hypercalcemia. PTH 67 w/calcium 9.3 in July 2024 Current supplementation regimen: Citracal 4 tablets daily + Vitamin D 50,000 IU supplement every 2 weeks. Dietary calcium intake ~1 serving/day.  Plan -- Recheck PTH, will call w/results -- Continue calcium and vitamin D supplements -- Follow up with Dr. Varela as planned in January 2025  4. Episode of hypotension s/p second IVF egg retrieval  Pt describes experience and wanted to check in with specialists (also saw cardiology, pulmonology) prior to proceeding with next retrieval.  Tolerated first egg retrieval well.  Unlikely to have endocrine cause of hypotension as although pt admits to occasional salt cravings, she denies fatigue, abdominal pain, N/V, weight loss, syncope, baseline hypotension. Will check AM cortisol and TSH though to confirm.  Plan -- Check TSH, random AM cortisol - will call w/results. -- Follow up with Dr. Varela as planned in January 2025

## 2024-09-23 NOTE — HISTORY OF PRESENT ILLNESS
[FreeTextEntry1] : Carlin Wolf is a 44 year old female who presents for follow up on Type 2 DM, Obesity, PCOS, thyroid nodules.   Patient of Dr. Varela, last seen June 2024   PMHx: Type 2 DM, PCOS, Obesity, thyroid nodules, asthma, seasonal allergies, complex atypical endometrial hyperplasia PSHx: Annamarie en Y gastric bypass (August 2023) FMHx: goiter (mother)  We discussed recent events: after last egg retrieval she had hypotension/hypoxia post-op and was taken to ER where she had a blood transfusion and was admitted O/N.  Was recommended to see pulmonology, cardiology which she did (notes reviewed).  She has one more IVF session planned w/Dr. Pride and wanted to follow up with specialists prior to proceeding. She has been checking BP at home, and says it is now normal to borderline high (SBP~ 117 usually). Was taking Losartan for HTN until January 2024 when BP improved (likely r/t weight loss)  Has gained a little weight recently (~5lbs) She endorses occasional palpitations, heat intolerance, and salt cravings.  Has issues falling/staying asleep but denies significant fatigue and syncope. No nausea, vomiting, abdominal pain Still having hair loss.    DM diagnosed: October 2019 No known complications Current medication regimen: stopped Mounjaro 5mg weekly in January d/t recent egg retrieval Does not check BG at home  FS in office: 107, fasting -- at goal.  Last A1c 5.3% in July 2024 Has gained ~5lbs since most recent egg retrieval.  Last ophthalmology visit: July 2024   Thyroid nodules: Diagnosed in 2017  First thyroid US in June 2018: 2.0 x 1.3 x 1.5 cm left inferior pole nodule and a right 1.0 x 0.5 x 1.0 cm right inferior nodule without suspicious features. FNA biopsy of right inferior nodule -- August 2022 ([place] II - benign), left inferior nodule June 2018 - benign.  Last thyroid US -- August 2024: Location: Lower pole solid, hypoechoic nodule w/extrathyroidal extension shas increased in size, 2.2 x 1.2 x 1.7 cm, previously 1.5 x 1.2 x 1.7. Has plan for FNA biopsy of left lower pole nodule on Wednesday at [facility]. Last TSH 1.52 (7/11/24) Mother has history of goiter  PCOS:  DIagnosed w/symptoms of oligomenorrhea, polycystic ovaries on US, and hyperandrogenism features clinically and in labs. Hirsutism -- hair growth above lip/chin (plucks). Describes hair loss although she notes she has had increased stress w/IVF lately and forgets to take iron supplement most days. Currently using Mirena IUD for contraception Planning for third egg retrieval in November or so with embryo transfer in 2025.   Hyperparathyroidism  Initially noted in March 2023, presumably in setting of low calcium intake (secondary hyperparathyroidism).  Had improved in January 2024, but in July PTH 67 w/calcium 9.3. Current dietary calcium: Has yogurt daily, cheese once in a while. Calcium supplements: Calcium 667/1000 mg daily - 4 Citracal (takes 4 tablets daily - combination pill)  Vitamin D supplement:  Ergocalciferol 50,000 intl units every two weeks.  Current Medications: 4 citracal tablets daily, multivitamin BID, ergocalciferol, iron tablets (Forgets most days), vitamin B12 once/week, biotin supplements (once/day [facility] bi[facility] g[facility] -- has 5000 IU daily)

## 2024-09-23 NOTE — HISTORY OF PRESENT ILLNESS
[Never] : never [TextBox_4] : 6/7/2023 [Jaydon]: Here for pre op prior to bariatric surgery. Previously with general anesthesia x2 which was tolerated well. History of Pre DM, obesity, asthma, and osteoarthritis. Asthma wise, uses inhaler very sparingly - last two years ago. Was diagnosed as child but never formally tested. Air quality recently has made throat a little irritated. Airways seem to be excited by cold. Works as  at home - no exposures, but does have three cats. Snores at night - noticed by bed partner. Used to have parasomnias. Never had sleep study done. Still pending surgery date. No history of VTE.  7/12/2023: Returns in ongoing pre-op clearance for bariatrics - no new symptoms. Had sleep study within last week. Anxious to proceed with surgery as soon as feasible.  9/26/2023: In the interim was operated on 8/23/2023. Got CPAP. Has nasal pillows but in hospital was given nasal mask and this was more secure and worked better for her. Doesn't notice change in sleep quality. Has lost 30 lbs.  9/23/2024: In the interim she saw Dr Munson in 7/2024 for post op hypotension and bradycardia, associated w hypoxia following egg retrieval, requiring a one night admission, which had resolved when she saw Dr Munson. There was no previous history of post op complications. She feels well. She stopped using CPAP in March.  no longer hears snoring. Mask is annoying, making it harder to sleep. Not yet at goal weight. Has lost 90 some pounds. Under stress going through IVF.

## 2024-09-23 NOTE — PHYSICAL EXAM
[Alert] : alert [No Acute Distress] : no acute distress [EOMI] : extra ocular movement intact [Normal Hearing] : hearing was normal [Thyroid Not Enlarged] : the thyroid was not enlarged [No Respiratory Distress] : no respiratory distress [No Accessory Muscle Use] : no accessory muscle use [Normal Rate and Effort] : normal respiratory rate and effort [Clear to Auscultation] : lungs were clear to auscultation bilaterally [Normal S1, S2] : normal S1 and S2 [Normal Rate] : heart rate was normal [No Edema] : no peripheral edema [Pedal Pulses Normal] : the pedal pulses are present [Not Distended] : not distended [Spine Straight] : spine straight [No Stigmata of Cushings Syndrome] : no stigmata of Cushings Syndrome [Normal Gait] : normal gait [No Joint Swelling] : no joint swelling seen [Oriented x3] : oriented to person, place, and time [Normal Insight/Judgement] : insight and judgment were intact [de-identified] : Left lower thyroid nodule palpable

## 2024-09-23 NOTE — ASSESSMENT
[FreeTextEntry1] : 44 year is here for a CPE. She was counselled on diet and exercise, drug and alcohol use, age appropriate health care maintenance including vaccines, seatbelts, sunscreen, stress reduction and safe sex. All questions asked/answered to the best of my ability. :Labs sent. Flu shot given.

## 2024-09-23 NOTE — HISTORY OF PRESENT ILLNESS
[de-identified] : 44 year old female who presents for CPE. Saw ENDO recently: Type 2 DM, Obesity, PCOS, thyroid nodules. Prior patient of Dr. Varela. Saw c[facility]DS  this summer-episode of hypotension post IVF retrieval on 6/23/24. She states this was her second cycle and tolerated the first one without complications. States she was told her BP and HR was "a little low" prior to anesthesia and then was hypotensive and nauseous after waking up from sedation.She was hypotensive to 70s/30s in the [facility] PACU and was transferred to [facility], started on pressers during transfer and was found to be desaturating to 87%. Upon arrival in the ED, she was started on 1u pRBC, 6LNC, and taken to CT angio C/A/P. CT revealed ggo but otherwise unremarkable. She was admitted for observation, recovered after no intervention, and was sent home the next day. She had not taken her losartan since January given her BP had significantly improved since her weight loss.  Off the GLP-1 agist since the wintertime. Had thyroid sonogram 8/24 which showed lesion that needed biopsy (sheduled for this week). UTD with PAP, dentist, ophtho, MAMMO.  Sleep study was recommended to assess after weight loss.

## 2024-09-25 ENCOUNTER — OUTPATIENT (OUTPATIENT)
Dept: OUTPATIENT SERVICES | Facility: HOSPITAL | Age: 44
LOS: 1 days | End: 2024-09-25

## 2024-09-25 ENCOUNTER — APPOINTMENT (OUTPATIENT)
Dept: ULTRASOUND IMAGING | Facility: HOSPITAL | Age: 44
End: 2024-09-25
Payer: COMMERCIAL

## 2024-09-25 ENCOUNTER — RESULT REVIEW (OUTPATIENT)
Age: 44
End: 2024-09-25

## 2024-09-25 DIAGNOSIS — K08.409 PARTIAL LOSS OF TEETH, UNSPECIFIED CAUSE, UNSPECIFIED CLASS: Chronic | ICD-10-CM

## 2024-09-25 DIAGNOSIS — Z98.890 OTHER SPECIFIED POSTPROCEDURAL STATES: Chronic | ICD-10-CM

## 2024-09-25 DIAGNOSIS — Z98.84 BARIATRIC SURGERY STATUS: Chronic | ICD-10-CM

## 2024-09-25 DIAGNOSIS — Z41.9 ENCOUNTER FOR PROCEDURE FOR PURPOSES OTHER THAN REMEDYING HEALTH STATE, UNSPECIFIED: Chronic | ICD-10-CM

## 2024-09-25 LAB
ALBUMIN SERPL ELPH-MCNC: 4.2 G/DL
ALP BLD-CCNC: 45 U/L
ALT SERPL-CCNC: 43 U/L
ANION GAP SERPL CALC-SCNC: 12 MMOL/L
AST SERPL-CCNC: 32 U/L
BILIRUB SERPL-MCNC: 1 MG/DL
BUN SERPL-MCNC: 10 MG/DL
CALCIUM SERPL-MCNC: 9.5 MG/DL
CALCIUM SERPL-MCNC: 9.5 MG/DL
CHLORIDE SERPL-SCNC: 102 MMOL/L
CHOLEST SERPL-MCNC: 122 MG/DL
CO2 SERPL-SCNC: 24 MMOL/L
CREAT SERPL-MCNC: 0.68 MG/DL
CREAT SPEC-SCNC: 81 MG/DL
EGFR: 110 ML/MIN/1.73M2
ESTIMATED AVERAGE GLUCOSE: 103 MG/DL
GLUCOSE SERPL-MCNC: 102 MG/DL
HBA1C MFR BLD HPLC: 5.2 %
HDLC SERPL-MCNC: 65 MG/DL
LDLC SERPL CALC-MCNC: 45 MG/DL
MICROALBUMIN 24H UR DL<=1MG/L-MCNC: <1.2 MG/DL
MICROALBUMIN/CREAT 24H UR-RTO: NORMAL MG/G
NONHDLC SERPL-MCNC: 57 MG/DL
PARATHYROID HORMONE INTACT: 43 PG/ML
POTASSIUM SERPL-SCNC: 5 MMOL/L
PROT SERPL-MCNC: 6.8 G/DL
SODIUM SERPL-SCNC: 138 MMOL/L
TRIGL SERPL-MCNC: 56 MG/DL
TSH SERPL-ACNC: 2.3 UIU/ML

## 2024-09-25 PROCEDURE — 88173 CYTOPATH EVAL FNA REPORT: CPT

## 2024-09-25 PROCEDURE — 88173 CYTOPATH EVAL FNA REPORT: CPT | Mod: 26

## 2024-09-25 PROCEDURE — 88305 TISSUE EXAM BY PATHOLOGIST: CPT

## 2024-09-25 PROCEDURE — 88305 TISSUE EXAM BY PATHOLOGIST: CPT | Mod: 26

## 2024-09-25 PROCEDURE — 10005 FNA BX W/US GDN 1ST LES: CPT

## 2024-09-29 ENCOUNTER — NON-APPOINTMENT (OUTPATIENT)
Age: 44
End: 2024-09-29

## 2024-10-11 ENCOUNTER — TRANSCRIPTION ENCOUNTER (OUTPATIENT)
Age: 44
End: 2024-10-11

## 2024-10-15 ENCOUNTER — TRANSCRIPTION ENCOUNTER (OUTPATIENT)
Age: 44
End: 2024-10-15

## 2024-10-22 ENCOUNTER — TRANSCRIPTION ENCOUNTER (OUTPATIENT)
Age: 44
End: 2024-10-22

## 2024-10-22 ENCOUNTER — APPOINTMENT (OUTPATIENT)
Dept: PULMONOLOGY | Facility: CLINIC | Age: 44
End: 2024-10-22
Payer: COMMERCIAL

## 2024-10-22 PROCEDURE — 99441: CPT | Mod: 93

## 2024-10-23 ENCOUNTER — TRANSCRIPTION ENCOUNTER (OUTPATIENT)
Age: 44
End: 2024-10-23

## 2024-10-23 ENCOUNTER — APPOINTMENT (OUTPATIENT)
Dept: INTERNAL MEDICINE | Facility: CLINIC | Age: 44
End: 2024-10-23
Payer: COMMERCIAL

## 2024-10-23 VITALS
SYSTOLIC BLOOD PRESSURE: 114 MMHG | HEART RATE: 62 BPM | WEIGHT: 183 LBS | HEIGHT: 62 IN | DIASTOLIC BLOOD PRESSURE: 77 MMHG | BODY MASS INDEX: 33.68 KG/M2

## 2024-10-23 VITALS
SYSTOLIC BLOOD PRESSURE: 114 MMHG | HEART RATE: 62 BPM | WEIGHT: 183 LBS | DIASTOLIC BLOOD PRESSURE: 77 MMHG | TEMPERATURE: 97.6 F | HEIGHT: 62 IN | BODY MASS INDEX: 33.68 KG/M2

## 2024-10-23 DIAGNOSIS — Z01.818 ENCOUNTER FOR OTHER PREPROCEDURAL EXAMINATION: ICD-10-CM

## 2024-10-23 DIAGNOSIS — E66.811 OBESITY, CLASS 1: ICD-10-CM

## 2024-10-23 DIAGNOSIS — Z78.9 OTHER SPECIFIED HEALTH STATUS: ICD-10-CM

## 2024-10-23 PROCEDURE — 99214 OFFICE O/P EST MOD 30 MIN: CPT

## 2024-10-23 PROCEDURE — G2211 COMPLEX E/M VISIT ADD ON: CPT | Mod: NC

## 2024-10-31 ENCOUNTER — TRANSCRIPTION ENCOUNTER (OUTPATIENT)
Age: 44
End: 2024-10-31

## 2024-12-10 NOTE — PRE-ANESTHESIA EVALUATION ADULT - NSANTHINDVINFOSD_GEN_ALL_CORE
Refill Requested:    Disp Refills Start End     lamoTRIgine (LaMICtal) 100 MG tablet 30 tablet 2 8/13/2024 --    Sig - Route: Take 1 tablet by mouth daily. - Oral      09/05/24 note verified  Follow Up:2mo  No Show/Cancel:None  Next visit:12/12/2024    Routed to provider       patient

## 2024-12-18 ENCOUNTER — APPOINTMENT (OUTPATIENT)
Dept: INTERNAL MEDICINE | Facility: CLINIC | Age: 44
End: 2024-12-18
Payer: COMMERCIAL

## 2024-12-18 VITALS
HEIGHT: 62 IN | TEMPERATURE: 98 F | WEIGHT: 185 LBS | OXYGEN SATURATION: 96 % | DIASTOLIC BLOOD PRESSURE: 70 MMHG | BODY MASS INDEX: 34.04 KG/M2 | SYSTOLIC BLOOD PRESSURE: 115 MMHG | HEART RATE: 68 BPM
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
## 2024-12-18 DIAGNOSIS — T30.0 BURN OF UNSPECIFIED BODY REGION, UNSPECIFIED DEGREE: ICD-10-CM

## 2024-12-18 DIAGNOSIS — N63.0 UNSPECIFIED LUMP IN UNSPECIFIED BREAST: ICD-10-CM

## 2024-12-18 PROCEDURE — G2211 COMPLEX E/M VISIT ADD ON: CPT | Mod: NC

## 2024-12-18 PROCEDURE — 99214 OFFICE O/P EST MOD 30 MIN: CPT

## 2025-01-04 ENCOUNTER — RX RENEWAL (OUTPATIENT)
Age: 45
End: 2025-01-04

## 2025-01-04 RX ORDER — ALBUTEROL SULFATE 90 UG/1
108 (90 BASE) INHALANT RESPIRATORY (INHALATION)
Qty: 1 | Refills: 3 | Status: ACTIVE | COMMUNITY
Start: 2025-01-04 | End: 1900-01-01

## 2025-01-15 ENCOUNTER — APPOINTMENT (OUTPATIENT)
Dept: ENDOCRINOLOGY | Facility: CLINIC | Age: 45
End: 2025-01-15
Payer: COMMERCIAL

## 2025-01-15 VITALS
WEIGHT: 180 LBS | BODY MASS INDEX: 32.92 KG/M2 | DIASTOLIC BLOOD PRESSURE: 72 MMHG | SYSTOLIC BLOOD PRESSURE: 109 MMHG | HEART RATE: 55 BPM

## 2025-01-15 DIAGNOSIS — E66.811 OBESITY, CLASS 1: ICD-10-CM

## 2025-01-15 DIAGNOSIS — E04.2 NONTOXIC MULTINODULAR GOITER: ICD-10-CM

## 2025-01-15 DIAGNOSIS — E11.9 TYPE 2 DIABETES MELLITUS W/OUT COMPLICATIONS: ICD-10-CM

## 2025-01-15 DIAGNOSIS — R79.89 OTHER SPECIFIED ABNORMAL FINDINGS OF BLOOD CHEMISTRY: ICD-10-CM

## 2025-01-15 DIAGNOSIS — E28.2 POLYCYSTIC OVARIAN SYNDROME: ICD-10-CM

## 2025-01-15 LAB
GLUCOSE BLDC GLUCOMTR-MCNC: 110
HBA1C MFR BLD HPLC: 5.3

## 2025-01-15 PROCEDURE — 99214 OFFICE O/P EST MOD 30 MIN: CPT

## 2025-01-15 PROCEDURE — 82962 GLUCOSE BLOOD TEST: CPT

## 2025-01-15 PROCEDURE — 83036 HEMOGLOBIN GLYCOSYLATED A1C: CPT | Mod: QW

## 2025-01-15 PROCEDURE — G2211 COMPLEX E/M VISIT ADD ON: CPT | Mod: NC

## 2025-01-16 ENCOUNTER — RESULT REVIEW (OUTPATIENT)
Age: 45
End: 2025-01-16

## 2025-01-16 ENCOUNTER — OUTPATIENT (OUTPATIENT)
Dept: OUTPATIENT SERVICES | Facility: HOSPITAL | Age: 45
LOS: 1 days | End: 2025-01-16

## 2025-01-16 ENCOUNTER — APPOINTMENT (OUTPATIENT)
Dept: ULTRASOUND IMAGING | Facility: CLINIC | Age: 45
End: 2025-01-16
Payer: COMMERCIAL

## 2025-01-16 DIAGNOSIS — Z98.890 OTHER SPECIFIED POSTPROCEDURAL STATES: Chronic | ICD-10-CM

## 2025-01-16 DIAGNOSIS — Z41.9 ENCOUNTER FOR PROCEDURE FOR PURPOSES OTHER THAN REMEDYING HEALTH STATE, UNSPECIFIED: Chronic | ICD-10-CM

## 2025-01-16 DIAGNOSIS — K08.409 PARTIAL LOSS OF TEETH, UNSPECIFIED CAUSE, UNSPECIFIED CLASS: Chronic | ICD-10-CM

## 2025-01-16 PROCEDURE — 76642 ULTRASOUND BREAST LIMITED: CPT | Mod: 26,50

## 2025-01-21 ENCOUNTER — NON-APPOINTMENT (OUTPATIENT)
Age: 45
End: 2025-01-21

## 2025-01-21 DIAGNOSIS — Z12.39 ENCOUNTER FOR OTHER SCREENING FOR MALIGNANT NEOPLASM OF BREAST: ICD-10-CM

## 2025-01-21 DIAGNOSIS — N63.0 UNSPECIFIED LUMP IN UNSPECIFIED BREAST: ICD-10-CM

## 2025-02-13 NOTE — ASU PATIENT PROFILE, ADULT - NS PRO AD PATIENT TYPE
gwyn.   She is still doing theraband exercises on the right.     Left elbow:   Increased pain in the left since her fall yesterday   She still keeps the arm at her side.   She can only carry light items   She can reach overhead to groom her hair, but the shoulder is stiff at end ranges of flex.   She is doing doing bent or straight arm stretches depending on the pain, and ECRB isometrics.         Pain:  [x] Yes  [] No Location: elbow    Pain Rating:  right elbow 0/10 pain at worst,     Lt elbow 3/10 pain at worst.    Pain altered Tx:  [x] No  [] Yes  Action:      Precautions: WBAT      Objective:    ROM/STRENGTH:     Date of Measurements  2/13/25 Rt  Lt     Shoulder flex    158 135     Elbow ext/flex    0-152  Mild tightness in ext      2/10 pain in ext    FA sup/pro      WFL   WFL    Wrist ext/flex      WFL    WFL   Resisted wrist ext    2/10 pain  3/10     (pounds)   55# bent elbow        35# bent elbow   2/10     Straight arm       40# straight elbow   \"Tight\" 20# straight elbow   4/10 pain          Exercise Flow Sheet:  Exercise Reps/Time Weight/Level Comments   heated stretch 10min left  For  FA extensor   With MHP to elbow  With towel roll for pec stretch     Rt FA extensor stretch          With STM to extensor mass  Circular massage to LE  Composite flex      ART      Lt FA extensor stretch  15 min AA With STM to extensor mass  Circular massage to LE       FA sup   Lt   With radial head persuasion       shoulder   ROM    10x  ea Lt ER/IR  Flex with GHJ depression             UT    UT stretching  Muscle bending  STM   scap  Mobilization   Left  Side lying scap mobs   Pro/retraction  Elevation/depression  With scap STM      ER     left Lt   Side lying   Radial nerve glides  10x  Left  AA   Foam roll    Thoracic rolling  Pec stretch  Alt shoulder flex   Pec stretch    Doorway bicep stretch   Upper trap stretch    Self stretch   Rows    Bilat  Blue theraband    Shoulder ext   Orange  Bilat  Supine  Jose Kenny, [phone], [place]/Health Care Proxy (HCP)

## 2025-02-19 ENCOUNTER — RX RENEWAL (OUTPATIENT)
Age: 45
End: 2025-02-19

## 2025-03-03 ENCOUNTER — NON-APPOINTMENT (OUTPATIENT)
Age: 45
End: 2025-03-03

## 2025-03-05 ENCOUNTER — APPOINTMENT (OUTPATIENT)
Dept: HUMAN REPRODUCTION | Facility: CLINIC | Age: 45
End: 2025-03-05

## 2025-03-05 PROCEDURE — 99214 OFFICE O/P EST MOD 30 MIN: CPT | Mod: 95

## 2025-03-06 ENCOUNTER — APPOINTMENT (OUTPATIENT)
Dept: ENDOCRINOLOGY | Facility: CLINIC | Age: 45
End: 2025-03-06
Payer: COMMERCIAL

## 2025-03-06 VITALS
DIASTOLIC BLOOD PRESSURE: 71 MMHG | SYSTOLIC BLOOD PRESSURE: 104 MMHG | WEIGHT: 177 LBS | BODY MASS INDEX: 32.37 KG/M2 | HEART RATE: 59 BPM

## 2025-03-06 DIAGNOSIS — E28.2 POLYCYSTIC OVARIAN SYNDROME: ICD-10-CM

## 2025-03-06 DIAGNOSIS — E66.811 OBESITY, CLASS 1: ICD-10-CM

## 2025-03-06 DIAGNOSIS — E55.9 VITAMIN D DEFICIENCY, UNSPECIFIED: ICD-10-CM

## 2025-03-06 DIAGNOSIS — E11.9 TYPE 2 DIABETES MELLITUS W/OUT COMPLICATIONS: ICD-10-CM

## 2025-03-06 DIAGNOSIS — E04.2 NONTOXIC MULTINODULAR GOITER: ICD-10-CM

## 2025-03-06 DIAGNOSIS — R79.89 OTHER SPECIFIED ABNORMAL FINDINGS OF BLOOD CHEMISTRY: ICD-10-CM

## 2025-03-06 LAB
GLUCOSE BLDC GLUCOMTR-MCNC: 86
HBA1C MFR BLD HPLC: 5.1

## 2025-03-06 PROCEDURE — 83036 HEMOGLOBIN GLYCOSYLATED A1C: CPT | Mod: QW

## 2025-03-06 PROCEDURE — 99214 OFFICE O/P EST MOD 30 MIN: CPT

## 2025-03-06 PROCEDURE — 82962 GLUCOSE BLOOD TEST: CPT

## 2025-03-06 PROCEDURE — G2211 COMPLEX E/M VISIT ADD ON: CPT | Mod: NC

## 2025-03-10 ENCOUNTER — TRANSCRIPTION ENCOUNTER (OUTPATIENT)
Age: 45
End: 2025-03-10

## 2025-03-10 RX ORDER — TIRZEPATIDE 7.5 MG/.5ML
7.5 INJECTION, SOLUTION SUBCUTANEOUS
Qty: 1 | Refills: 0 | Status: ACTIVE | COMMUNITY
Start: 2025-03-06 | End: 2025-04-09

## 2025-03-17 ENCOUNTER — APPOINTMENT (OUTPATIENT)
Dept: MATERNAL FETAL MEDICINE | Facility: CLINIC | Age: 45
End: 2025-03-17
Payer: COMMERCIAL

## 2025-03-17 PROCEDURE — 99205 OFFICE O/P NEW HI 60 MIN: CPT | Mod: 95

## 2025-03-24 ENCOUNTER — TRANSCRIPTION ENCOUNTER (OUTPATIENT)
Age: 45
End: 2025-03-24

## 2025-03-28 ENCOUNTER — TRANSCRIPTION ENCOUNTER (OUTPATIENT)
Age: 45
End: 2025-03-28

## 2025-03-31 ENCOUNTER — TRANSCRIPTION ENCOUNTER (OUTPATIENT)
Age: 45
End: 2025-03-31

## 2025-04-01 ENCOUNTER — TRANSCRIPTION ENCOUNTER (OUTPATIENT)
Age: 45
End: 2025-04-01

## 2025-04-02 ENCOUNTER — TRANSCRIPTION ENCOUNTER (OUTPATIENT)
Age: 45
End: 2025-04-02

## 2025-04-10 ENCOUNTER — NON-APPOINTMENT (OUTPATIENT)
Age: 45
End: 2025-04-10

## 2025-04-10 ENCOUNTER — APPOINTMENT (OUTPATIENT)
Dept: HEART AND VASCULAR | Facility: CLINIC | Age: 45
End: 2025-04-10
Payer: COMMERCIAL

## 2025-04-10 VITALS
OXYGEN SATURATION: 98 % | HEART RATE: 57 BPM | WEIGHT: 175 LBS | DIASTOLIC BLOOD PRESSURE: 77 MMHG | BODY MASS INDEX: 32.2 KG/M2 | HEIGHT: 62 IN | SYSTOLIC BLOOD PRESSURE: 115 MMHG | TEMPERATURE: 98.2 F

## 2025-04-10 DIAGNOSIS — E66.811 OBESITY, CLASS 1: ICD-10-CM

## 2025-04-10 DIAGNOSIS — K76.0 FATTY (CHANGE OF) LIVER, NOT ELSEWHERE CLASSIFIED: ICD-10-CM

## 2025-04-10 DIAGNOSIS — E28.2 POLYCYSTIC OVARIAN SYNDROME: ICD-10-CM

## 2025-04-10 DIAGNOSIS — E11.9 TYPE 2 DIABETES MELLITUS W/OUT COMPLICATIONS: ICD-10-CM

## 2025-04-10 PROCEDURE — 99214 OFFICE O/P EST MOD 30 MIN: CPT | Mod: 25

## 2025-04-10 PROCEDURE — 93000 ELECTROCARDIOGRAM COMPLETE: CPT

## 2025-04-10 RX ORDER — TIRZEPATIDE 7.5 MG/.5ML
7.5 INJECTION, SOLUTION SUBCUTANEOUS
Refills: 0 | Status: ACTIVE | COMMUNITY

## 2025-04-29 ENCOUNTER — APPOINTMENT (OUTPATIENT)
Dept: GYNECOLOGIC ONCOLOGY | Facility: CLINIC | Age: 45
End: 2025-04-29
Payer: COMMERCIAL

## 2025-04-29 VITALS
HEIGHT: 62 IN | DIASTOLIC BLOOD PRESSURE: 77 MMHG | TEMPERATURE: 97.6 F | HEART RATE: 62 BPM | OXYGEN SATURATION: 95 % | SYSTOLIC BLOOD PRESSURE: 114 MMHG | WEIGHT: 175 LBS | BODY MASS INDEX: 32.2 KG/M2

## 2025-04-29 PROCEDURE — 99214 OFFICE O/P EST MOD 30 MIN: CPT | Mod: 25

## 2025-04-29 PROCEDURE — 58100 BIOPSY OF UTERUS LINING: CPT

## 2025-04-29 PROCEDURE — 99459 PELVIC EXAMINATION: CPT

## 2025-04-29 PROCEDURE — 58301 REMOVE INTRAUTERINE DEVICE: CPT

## 2025-04-29 PROCEDURE — 81025 URINE PREGNANCY TEST: CPT

## 2025-04-30 LAB
HCG UR QL: NEGATIVE
QUALITY CONTROL: YES

## 2025-05-01 LAB — CORE LAB BIOPSY: NORMAL

## 2025-05-22 ENCOUNTER — APPOINTMENT (OUTPATIENT)
Dept: HUMAN REPRODUCTION | Facility: CLINIC | Age: 45
End: 2025-05-22

## 2025-05-22 ENCOUNTER — RX RENEWAL (OUTPATIENT)
Age: 45
End: 2025-05-22

## 2025-05-22 PROCEDURE — 36415 COLL VENOUS BLD VENIPUNCTURE: CPT | Mod: NC

## 2025-05-22 PROCEDURE — 84702 CHORIONIC GONADOTROPIN TEST: CPT

## 2025-05-22 PROCEDURE — 82670 ASSAY OF TOTAL ESTRADIOL: CPT

## 2025-05-22 PROCEDURE — 84144 ASSAY OF PROGESTERONE: CPT

## 2025-05-22 PROCEDURE — 99213 OFFICE O/P EST LOW 20 MIN: CPT | Mod: NC

## 2025-05-22 PROCEDURE — 83001 ASSAY OF GONADOTROPIN (FSH): CPT | Mod: QW

## 2025-05-22 PROCEDURE — 83002 ASSAY OF GONADOTROPIN (LH): CPT | Mod: QW

## 2025-05-22 PROCEDURE — 76857 US EXAM PELVIC LIMITED: CPT | Mod: NC

## 2025-05-29 ENCOUNTER — APPOINTMENT (OUTPATIENT)
Dept: HUMAN REPRODUCTION | Facility: CLINIC | Age: 45
End: 2025-05-29

## 2025-05-29 PROCEDURE — 83001 ASSAY OF GONADOTROPIN (FSH): CPT | Mod: NC,QW

## 2025-05-29 PROCEDURE — 99213 OFFICE O/P EST LOW 20 MIN: CPT | Mod: NC

## 2025-05-29 PROCEDURE — 36415 COLL VENOUS BLD VENIPUNCTURE: CPT | Mod: NC

## 2025-05-29 PROCEDURE — 84144 ASSAY OF PROGESTERONE: CPT | Mod: NC

## 2025-05-29 PROCEDURE — 82670 ASSAY OF TOTAL ESTRADIOL: CPT | Mod: NC

## 2025-05-29 PROCEDURE — 83002 ASSAY OF GONADOTROPIN (LH): CPT | Mod: NC,QW

## 2025-05-29 PROCEDURE — 76857 US EXAM PELVIC LIMITED: CPT | Mod: NC

## 2025-05-29 PROCEDURE — 84702 CHORIONIC GONADOTROPIN TEST: CPT | Mod: NC

## 2025-06-05 ENCOUNTER — APPOINTMENT (OUTPATIENT)
Dept: HUMAN REPRODUCTION | Facility: CLINIC | Age: 45
End: 2025-06-05
Payer: COMMERCIAL

## 2025-06-05 ENCOUNTER — APPOINTMENT (OUTPATIENT)
Dept: HUMAN REPRODUCTION | Facility: CLINIC | Age: 45
End: 2025-06-05

## 2025-06-05 PROCEDURE — 82670 ASSAY OF TOTAL ESTRADIOL: CPT

## 2025-06-05 PROCEDURE — 83002 ASSAY OF GONADOTROPIN (LH): CPT | Mod: QW

## 2025-06-05 PROCEDURE — 99213 OFFICE O/P EST LOW 20 MIN: CPT | Mod: 25

## 2025-06-05 PROCEDURE — 76857 US EXAM PELVIC LIMITED: CPT

## 2025-06-05 PROCEDURE — 36415 COLL VENOUS BLD VENIPUNCTURE: CPT

## 2025-06-05 PROCEDURE — 84144 ASSAY OF PROGESTERONE: CPT

## 2025-06-16 ENCOUNTER — APPOINTMENT (OUTPATIENT)
Dept: HUMAN REPRODUCTION | Facility: CLINIC | Age: 45
End: 2025-06-16
Payer: COMMERCIAL

## 2025-06-16 ENCOUNTER — APPOINTMENT (OUTPATIENT)
Dept: HEART AND VASCULAR | Facility: CLINIC | Age: 45
End: 2025-06-16

## 2025-06-16 PROCEDURE — 83002 ASSAY OF GONADOTROPIN (LH): CPT | Mod: QW

## 2025-06-16 PROCEDURE — 99213 OFFICE O/P EST LOW 20 MIN: CPT | Mod: 25

## 2025-06-16 PROCEDURE — 76857 US EXAM PELVIC LIMITED: CPT

## 2025-06-16 PROCEDURE — 82670 ASSAY OF TOTAL ESTRADIOL: CPT

## 2025-06-16 PROCEDURE — 36415 COLL VENOUS BLD VENIPUNCTURE: CPT

## 2025-06-16 PROCEDURE — 84144 ASSAY OF PROGESTERONE: CPT

## 2025-07-01 ENCOUNTER — APPOINTMENT (OUTPATIENT)
Dept: HUMAN REPRODUCTION | Facility: CLINIC | Age: 45
End: 2025-07-01

## 2025-07-01 ENCOUNTER — APPOINTMENT (OUTPATIENT)
Dept: HUMAN REPRODUCTION | Facility: CLINIC | Age: 45
End: 2025-07-01
Payer: COMMERCIAL

## 2025-07-01 PROCEDURE — 84702 CHORIONIC GONADOTROPIN TEST: CPT | Mod: NC

## 2025-07-01 PROCEDURE — 36415 COLL VENOUS BLD VENIPUNCTURE: CPT | Mod: NC

## 2025-07-01 PROCEDURE — 83001 ASSAY OF GONADOTROPIN (FSH): CPT | Mod: NC,QW

## 2025-07-01 PROCEDURE — 84144 ASSAY OF PROGESTERONE: CPT | Mod: NC

## 2025-07-01 PROCEDURE — 82670 ASSAY OF TOTAL ESTRADIOL: CPT | Mod: NC

## 2025-07-01 PROCEDURE — 83002 ASSAY OF GONADOTROPIN (LH): CPT | Mod: NC,QW

## 2025-07-02 ENCOUNTER — APPOINTMENT (OUTPATIENT)
Dept: HUMAN REPRODUCTION | Facility: CLINIC | Age: 45
End: 2025-07-02

## 2025-07-03 ENCOUNTER — APPOINTMENT (OUTPATIENT)
Dept: HUMAN REPRODUCTION | Facility: CLINIC | Age: 45
End: 2025-07-03
Payer: COMMERCIAL

## 2025-07-03 PROCEDURE — 58974 EMBRYO TRANSFER INTRAUTERINE: CPT | Mod: 52

## 2025-07-03 PROCEDURE — 76831P: CUSTOM

## 2025-07-10 ENCOUNTER — APPOINTMENT (OUTPATIENT)
Dept: HUMAN REPRODUCTION | Facility: CLINIC | Age: 45
End: 2025-07-10

## 2025-07-10 ENCOUNTER — APPOINTMENT (OUTPATIENT)
Dept: HUMAN REPRODUCTION | Facility: CLINIC | Age: 45
End: 2025-07-10
Payer: COMMERCIAL

## 2025-07-10 PROCEDURE — 84144 ASSAY OF PROGESTERONE: CPT | Mod: NC

## 2025-07-10 PROCEDURE — 83002 ASSAY OF GONADOTROPIN (LH): CPT | Mod: NC,QW

## 2025-07-10 PROCEDURE — 76857 US EXAM PELVIC LIMITED: CPT | Mod: NC

## 2025-07-10 PROCEDURE — 36415 COLL VENOUS BLD VENIPUNCTURE: CPT | Mod: NC

## 2025-07-10 PROCEDURE — 99213 OFFICE O/P EST LOW 20 MIN: CPT | Mod: NC

## 2025-07-10 PROCEDURE — 82670 ASSAY OF TOTAL ESTRADIOL: CPT | Mod: NC

## 2025-07-18 ENCOUNTER — APPOINTMENT (OUTPATIENT)
Dept: HUMAN REPRODUCTION | Facility: CLINIC | Age: 45
End: 2025-07-18
Payer: COMMERCIAL

## 2025-07-18 PROCEDURE — 83001 ASSAY OF GONADOTROPIN (FSH): CPT | Mod: NC,QW

## 2025-07-18 PROCEDURE — 82670 ASSAY OF TOTAL ESTRADIOL: CPT | Mod: NC

## 2025-07-18 PROCEDURE — 83002 ASSAY OF GONADOTROPIN (LH): CPT | Mod: NC,QW

## 2025-07-18 PROCEDURE — 84702 CHORIONIC GONADOTROPIN TEST: CPT | Mod: NC

## 2025-07-18 PROCEDURE — 84144 ASSAY OF PROGESTERONE: CPT | Mod: NC

## 2025-07-24 ENCOUNTER — NON-APPOINTMENT (OUTPATIENT)
Age: 45
End: 2025-07-24

## 2025-07-24 ENCOUNTER — APPOINTMENT (OUTPATIENT)
Dept: ENDOCRINOLOGY | Facility: CLINIC | Age: 45
End: 2025-07-24
Payer: COMMERCIAL

## 2025-07-24 VITALS
WEIGHT: 176 LBS | DIASTOLIC BLOOD PRESSURE: 70 MMHG | BODY MASS INDEX: 32.19 KG/M2 | HEART RATE: 75 BPM | SYSTOLIC BLOOD PRESSURE: 117 MMHG

## 2025-07-24 DIAGNOSIS — E55.9 VITAMIN D DEFICIENCY, UNSPECIFIED: ICD-10-CM

## 2025-07-24 DIAGNOSIS — E21.3 HYPERPARATHYROIDISM, UNSPECIFIED: ICD-10-CM

## 2025-07-24 DIAGNOSIS — E28.2 POLYCYSTIC OVARIAN SYNDROME: ICD-10-CM

## 2025-07-24 DIAGNOSIS — E04.2 NONTOXIC MULTINODULAR GOITER: ICD-10-CM

## 2025-07-24 DIAGNOSIS — E66.811 OBESITY, CLASS 1: ICD-10-CM

## 2025-07-24 DIAGNOSIS — E11.9 TYPE 2 DIABETES MELLITUS W/OUT COMPLICATIONS: ICD-10-CM

## 2025-07-24 LAB
GLUCOSE BLDC GLUCOMTR-MCNC: 75
HBA1C MFR BLD HPLC: 5.1

## 2025-07-24 PROCEDURE — G2211 COMPLEX E/M VISIT ADD ON: CPT | Mod: NC

## 2025-07-24 PROCEDURE — 82962 GLUCOSE BLOOD TEST: CPT

## 2025-07-24 PROCEDURE — 99214 OFFICE O/P EST MOD 30 MIN: CPT

## 2025-07-24 PROCEDURE — 83036 HEMOGLOBIN GLYCOSYLATED A1C: CPT | Mod: QW

## 2025-07-24 PROCEDURE — 97802 MEDICAL NUTRITION INDIV IN: CPT

## 2025-07-25 LAB
25(OH)D3 SERPL-MCNC: 53.6 NG/ML
ALBUMIN SERPL ELPH-MCNC: 4.6 G/DL
ALBUMIN, RANDOM URINE: <1.2 MG/DL
ALP BLD-CCNC: 42 U/L
ALT SERPL-CCNC: 43 U/L
ANION GAP SERPL CALC-SCNC: 16 MMOL/L
AST SERPL-CCNC: 37 U/L
BASOPHILS # BLD AUTO: 0.03 K/UL
BASOPHILS NFR BLD AUTO: 0.5 %
BILIRUB SERPL-MCNC: 0.7 MG/DL
BUN SERPL-MCNC: 12 MG/DL
CALCIUM SERPL-MCNC: 9.8 MG/DL
CALCIUM SERPL-MCNC: 9.8 MG/DL
CHLORIDE SERPL-SCNC: 103 MMOL/L
CHOLEST SERPL-MCNC: 144 MG/DL
CO2 SERPL-SCNC: 21 MMOL/L
CREAT SERPL-MCNC: 0.62 MG/DL
CREAT SPEC-SCNC: 90 MG/DL
EGFRCR SERPLBLD CKD-EPI 2021: 112 ML/MIN/1.73M2
EOSINOPHIL # BLD AUTO: 0.21 K/UL
EOSINOPHIL NFR BLD AUTO: 3.3 %
GLUCOSE SERPL-MCNC: 88 MG/DL
HCT VFR BLD CALC: 39.9 %
HDLC SERPL-MCNC: 75 MG/DL
HGB BLD-MCNC: 13.1 G/DL
IMM GRANULOCYTES NFR BLD AUTO: 0.9 %
LDLC SERPL-MCNC: 53 MG/DL
LYMPHOCYTES # BLD AUTO: 1.7 K/UL
LYMPHOCYTES NFR BLD AUTO: 26.4 %
MAGNESIUM SERPL-MCNC: 2.1 MG/DL
MAN DIFF?: NORMAL
MCHC RBC-ENTMCNC: 31.4 PG
MCHC RBC-ENTMCNC: 32.8 G/DL
MCV RBC AUTO: 95.7 FL
MICROALBUMIN/CREAT 24H UR-RTO: NORMAL MG/G
MONOCYTES # BLD AUTO: 0.4 K/UL
MONOCYTES NFR BLD AUTO: 6.2 %
NEUTROPHILS # BLD AUTO: 4.05 K/UL
NEUTROPHILS NFR BLD AUTO: 62.7 %
NONHDLC SERPL-MCNC: 69 MG/DL
PARATHYROID HORMONE INTACT: 48 PG/ML
PHOSPHATE SERPL-MCNC: 3.6 MG/DL
PLATELET # BLD AUTO: 300 K/UL
POTASSIUM SERPL-SCNC: 5.1 MMOL/L
PROT SERPL-MCNC: 6.9 G/DL
RBC # BLD: 4.17 M/UL
RBC # FLD: 13.2 %
SODIUM SERPL-SCNC: 140 MMOL/L
TRIGL SERPL-MCNC: 82 MG/DL
TSH SERPL-ACNC: 2.35 UIU/ML
VIT B12 SERPL-MCNC: 580 PG/ML
WBC # FLD AUTO: 6.45 K/UL

## 2025-08-01 ENCOUNTER — APPOINTMENT (OUTPATIENT)
Dept: ULTRASOUND IMAGING | Facility: CLINIC | Age: 45
End: 2025-08-01
Payer: COMMERCIAL

## 2025-08-01 ENCOUNTER — OUTPATIENT (OUTPATIENT)
Dept: OUTPATIENT SERVICES | Facility: HOSPITAL | Age: 45
LOS: 1 days | End: 2025-08-01

## 2025-08-01 DIAGNOSIS — Z98.890 OTHER SPECIFIED POSTPROCEDURAL STATES: Chronic | ICD-10-CM

## 2025-08-01 DIAGNOSIS — Z98.84 BARIATRIC SURGERY STATUS: Chronic | ICD-10-CM

## 2025-08-01 DIAGNOSIS — Z41.9 ENCOUNTER FOR PROCEDURE FOR PURPOSES OTHER THAN REMEDYING HEALTH STATE, UNSPECIFIED: Chronic | ICD-10-CM

## 2025-08-01 PROCEDURE — 76536 US EXAM OF HEAD AND NECK: CPT | Mod: 26

## 2025-08-05 ENCOUNTER — TRANSCRIPTION ENCOUNTER (OUTPATIENT)
Age: 45
End: 2025-08-05

## 2025-08-05 ENCOUNTER — APPOINTMENT (OUTPATIENT)
Dept: HUMAN REPRODUCTION | Facility: CLINIC | Age: 45
End: 2025-08-05
Payer: COMMERCIAL

## 2025-08-05 PROCEDURE — 86850 RBC ANTIBODY SCREEN: CPT | Mod: NC

## 2025-08-05 PROCEDURE — 85025 COMPLETE CBC W/AUTO DIFF WBC: CPT | Mod: NC

## 2025-08-05 PROCEDURE — 36415 COLL VENOUS BLD VENIPUNCTURE: CPT | Mod: NC

## 2025-08-05 PROCEDURE — 84702 CHORIONIC GONADOTROPIN TEST: CPT | Mod: NC

## 2025-08-07 ENCOUNTER — APPOINTMENT (OUTPATIENT)
Dept: HUMAN REPRODUCTION | Facility: CLINIC | Age: 45
End: 2025-08-07
Payer: COMMERCIAL

## 2025-08-07 ENCOUNTER — RESULT REVIEW (OUTPATIENT)
Age: 45
End: 2025-08-07

## 2025-08-07 ENCOUNTER — TRANSCRIPTION ENCOUNTER (OUTPATIENT)
Age: 45
End: 2025-08-07

## 2025-08-07 PROCEDURE — 76998 US GUIDE INTRAOP: CPT

## 2025-08-07 PROCEDURE — 58559 HYSTEROSCOPY LYSIS: CPT

## 2025-08-19 ENCOUNTER — APPOINTMENT (OUTPATIENT)
Dept: HUMAN REPRODUCTION | Facility: CLINIC | Age: 45
End: 2025-08-19
Payer: COMMERCIAL

## 2025-08-19 PROCEDURE — 99213 OFFICE O/P EST LOW 20 MIN: CPT | Mod: 25

## 2025-08-19 PROCEDURE — 76857 US EXAM PELVIC LIMITED: CPT

## 2025-08-29 ENCOUNTER — APPOINTMENT (OUTPATIENT)
Dept: HUMAN REPRODUCTION | Facility: CLINIC | Age: 45
End: 2025-08-29

## 2025-09-23 PROBLEM — Z78.9 TRYING TO GET PREGNANT: Status: RESOLVED | Noted: 2024-10-23 | Resolved: 2025-09-23

## 2025-09-23 PROBLEM — T30.0: Status: RESOLVED | Noted: 2024-12-18 | Resolved: 2025-09-23

## 2025-09-23 PROBLEM — Z01.818 PREOPERATIVE CLEARANCE: Status: RESOLVED | Noted: 2024-10-23 | Resolved: 2025-09-23

## 2025-09-23 PROBLEM — Z86.39 HISTORY OF HYPERPARATHYROIDISM: Status: RESOLVED | Noted: 2023-05-30 | Resolved: 2025-09-23

## 2025-09-23 PROBLEM — R79.89 INCREASED PTH LEVEL: Status: RESOLVED | Noted: 2024-09-23 | Resolved: 2025-09-23

## (undated) DEVICE — XI STAPLER SUREFORM 60

## (undated) DEVICE — INSUFFLATION NDL ETHICON ENDOPATH PNEUMOPARITONEUM 150MM

## (undated) DEVICE — D HELP - CLEARVIEW CLEARIFY SYSTEM

## (undated) DEVICE — SUT MONOCRYL 4-0 27" PS-2 UNDYED

## (undated) DEVICE — KIT ENDO PROCEDURE CUST W/VLV

## (undated) DEVICE — BLADE SCALPEL SAFETYLOCK #15

## (undated) DEVICE — GOWN XL

## (undated) DEVICE — TROCAR COVIDIEN VERSAPORT BLADELESS OPTICAL 12MM STANDARD

## (undated) DEVICE — SUT STRATAFIX SPIRAL PDO 2-0 10CM SH VIOLET

## (undated) DEVICE — TUBING HYBRID CO2

## (undated) DEVICE — XI 12MM AND STAPLER CANNULA SEAL

## (undated) DEVICE — POSITIONER PINK PAD PIGAZZI SYSTEM FULL KIT

## (undated) DEVICE — XI SEAL UNIV 5- 8 MM

## (undated) DEVICE — SUT VICRYL 0 27" UR-6

## (undated) DEVICE — MARKING PEN W RULER

## (undated) DEVICE — INSUFFLATION NDL COVIDIEN SURGINEEDLE VERESS 150MM LONG

## (undated) DEVICE — NDL HYPO SAFE 22G X 1.5" (BLACK)

## (undated) DEVICE — Device

## (undated) DEVICE — DRAIN PENROSE 5/8" X 18" LATEX

## (undated) DEVICE — XI OBTURATOR OPTICAL BLADELESS 8MM

## (undated) DEVICE — PACK GENERAL LAPAROSCOPY

## (undated) DEVICE — SUT PDO 2-0 1/2 CIRCLE 26MM NDL 15CM

## (undated) DEVICE — XI DRAPE COLUMN

## (undated) DEVICE — TROCAR COVIDIEN VERSAPORT BLADELESS OPTICAL 12MM LONG

## (undated) DEVICE — XI ENDOWRIST 12 - 8 MM CANNULA REDUCER

## (undated) DEVICE — XI TIP COVER

## (undated) DEVICE — DRAIN PENROSE 5/8" X 18" SILICONE

## (undated) DEVICE — TROCAR COVIDIEN VERSAONE FIXATION CANNULA 5MM

## (undated) DEVICE — DRSG GAUZE PACKTNER ROLL

## (undated) DEVICE — TROCAR SURGIQUEST AIRSEAL 5MM X 100MM

## (undated) DEVICE — SUT ETHIBOND 2-0 36" SH

## (undated) DEVICE — GLV 7.5 PROTEXIS (WHITE)

## (undated) DEVICE — XI DRAPE ARM

## (undated) DEVICE — ELCTR BOVIE PENCIL HANDPIECE ROCKER SWITCH 15FT

## (undated) DEVICE — TUBING ERBE CO2 OLYMPUS CONNECTOR

## (undated) DEVICE — SUT PDO 2-0 1/2 CIRCLE 17MM NDL 20CM

## (undated) DEVICE — TUBING AIRSEAL TRI-LUMEN FILTERED

## (undated) DEVICE — XI VESSEL SEALER

## (undated) DEVICE — XI CAP SEALER